# Patient Record
Sex: MALE | Race: WHITE | NOT HISPANIC OR LATINO | Employment: OTHER | URBAN - METROPOLITAN AREA
[De-identification: names, ages, dates, MRNs, and addresses within clinical notes are randomized per-mention and may not be internally consistent; named-entity substitution may affect disease eponyms.]

---

## 2018-02-26 ENCOUNTER — LAB (OUTPATIENT)
Dept: LAB | Facility: HOSPITAL | Age: 68
End: 2018-02-26

## 2018-02-26 ENCOUNTER — CONSULT (OUTPATIENT)
Dept: ONCOLOGY | Facility: CLINIC | Age: 68
End: 2018-02-26

## 2018-02-26 VITALS
HEIGHT: 72 IN | DIASTOLIC BLOOD PRESSURE: 64 MMHG | OXYGEN SATURATION: 99 % | BODY MASS INDEX: 34.1 KG/M2 | RESPIRATION RATE: 12 BRPM | SYSTOLIC BLOOD PRESSURE: 128 MMHG | WEIGHT: 251.8 LBS | HEART RATE: 53 BPM | TEMPERATURE: 97.8 F

## 2018-02-26 DIAGNOSIS — D64.9 NORMOCYTIC ANEMIA: ICD-10-CM

## 2018-02-26 DIAGNOSIS — N18.30 CHRONIC KIDNEY DISEASE, STAGE III (MODERATE) (HCC): Primary | ICD-10-CM

## 2018-02-26 DIAGNOSIS — N18.4 ANEMIA OF CHRONIC KIDNEY FAILURE, STAGE 4 (SEVERE) (HCC): Primary | ICD-10-CM

## 2018-02-26 DIAGNOSIS — D63.1 ANEMIA OF CHRONIC KIDNEY FAILURE, STAGE 4 (SEVERE) (HCC): Primary | ICD-10-CM

## 2018-02-26 LAB
ALBUMIN SERPL-MCNC: 3.7 G/DL (ref 3.5–5.2)
ALBUMIN/GLOB SERPL: 1.1 G/DL (ref 1.1–2.4)
ALP SERPL-CCNC: 147 U/L (ref 38–116)
ALT SERPL W P-5'-P-CCNC: 51 U/L (ref 0–41)
ANION GAP SERPL CALCULATED.3IONS-SCNC: 12.5 MMOL/L
AST SERPL-CCNC: 35 U/L (ref 0–40)
BASOPHILS # BLD AUTO: 0.03 10*3/MM3 (ref 0–0.1)
BASOPHILS NFR BLD AUTO: 0.5 % (ref 0–1.1)
BILIRUB SERPL-MCNC: 0.3 MG/DL (ref 0.1–1.2)
BUN BLD-MCNC: 86 MG/DL (ref 6–20)
BUN/CREAT SERPL: 32.6 (ref 7.3–30)
CALCIUM SPEC-SCNC: 9.5 MG/DL (ref 8.5–10.2)
CHLORIDE SERPL-SCNC: 102 MMOL/L (ref 98–107)
CO2 SERPL-SCNC: 23.5 MMOL/L (ref 22–29)
CREAT BLD-MCNC: 2.64 MG/DL (ref 0.7–1.3)
DEPRECATED RDW RBC AUTO: 45.4 FL (ref 37–49)
EOSINOPHIL # BLD AUTO: 0.71 10*3/MM3 (ref 0–0.36)
EOSINOPHIL NFR BLD AUTO: 11.9 % (ref 1–5)
ERYTHROCYTE [DISTWIDTH] IN BLOOD BY AUTOMATED COUNT: 13.5 % (ref 11.7–14.5)
FERRITIN SERPL-MCNC: 93.4 NG/ML (ref 30–400)
GFR SERPL CREATININE-BSD FRML MDRD: 24 ML/MIN/1.73
GLOBULIN UR ELPH-MCNC: 3.3 GM/DL (ref 1.8–3.5)
GLUCOSE BLD-MCNC: 156 MG/DL (ref 74–124)
HCT VFR BLD AUTO: 30.4 % (ref 40–49)
HGB BLD-MCNC: 10.4 G/DL (ref 13.5–16.5)
HGB RETIC QN: 34.1 PG (ref 29.8–36.1)
IMM GRANULOCYTES # BLD: 0.03 10*3/MM3 (ref 0–0.03)
IMM GRANULOCYTES NFR BLD: 0.5 % (ref 0–0.5)
IMM RETICS NFR: 6 % (ref 3–15.8)
IRON 24H UR-MRATE: 72 MCG/DL (ref 59–158)
IRON SATN MFR SERPL: 23 % (ref 14–48)
LDH SERPL-CCNC: 166 U/L (ref 99–259)
LYMPHOCYTES # BLD AUTO: 0.94 10*3/MM3 (ref 1–3.5)
LYMPHOCYTES NFR BLD AUTO: 15.8 % (ref 20–49)
MCH RBC QN AUTO: 31.3 PG (ref 27–33)
MCHC RBC AUTO-ENTMCNC: 34.2 G/DL (ref 32–35)
MCV RBC AUTO: 91.6 FL (ref 83–97)
MONOCYTES # BLD AUTO: 0.62 10*3/MM3 (ref 0.25–0.8)
MONOCYTES NFR BLD AUTO: 10.4 % (ref 4–12)
NEUTROPHILS # BLD AUTO: 3.62 10*3/MM3 (ref 1.5–7)
NEUTROPHILS NFR BLD AUTO: 60.9 % (ref 39–75)
NRBC BLD MANUAL-RTO: 0 /100 WBC (ref 0–0)
PLATELET # BLD AUTO: 226 10*3/MM3 (ref 150–375)
PMV BLD AUTO: 11.1 FL (ref 8.9–12.1)
POTASSIUM BLD-SCNC: 4.4 MMOL/L (ref 3.5–4.7)
PROT SERPL-MCNC: 7 G/DL (ref 6.3–8)
RBC # BLD AUTO: 3.32 10*6/MM3 (ref 4.3–5.5)
RETICS/RBC NFR AUTO: 1.53 % (ref 0.6–2)
SODIUM BLD-SCNC: 138 MMOL/L (ref 134–145)
TIBC SERPL-MCNC: 316 MCG/DL (ref 249–505)
TRANSFERRIN SERPL-MCNC: 226 MG/DL (ref 200–360)
VIT B12 BLD-MCNC: 789 PG/ML (ref 211–946)
WBC NRBC COR # BLD: 5.95 10*3/MM3 (ref 4–10)

## 2018-02-26 PROCEDURE — 85046 RETICYTE/HGB CONCENTRATE: CPT | Performed by: INTERNAL MEDICINE

## 2018-02-26 PROCEDURE — 83540 ASSAY OF IRON: CPT | Performed by: INTERNAL MEDICINE

## 2018-02-26 PROCEDURE — 82607 VITAMIN B-12: CPT | Performed by: INTERNAL MEDICINE

## 2018-02-26 PROCEDURE — 82728 ASSAY OF FERRITIN: CPT | Performed by: INTERNAL MEDICINE

## 2018-02-26 PROCEDURE — 80053 COMPREHEN METABOLIC PANEL: CPT | Performed by: INTERNAL MEDICINE

## 2018-02-26 PROCEDURE — 99205 OFFICE O/P NEW HI 60 MIN: CPT | Performed by: INTERNAL MEDICINE

## 2018-02-26 PROCEDURE — 83615 LACTATE (LD) (LDH) ENZYME: CPT | Performed by: INTERNAL MEDICINE

## 2018-02-26 PROCEDURE — 84466 ASSAY OF TRANSFERRIN: CPT | Performed by: INTERNAL MEDICINE

## 2018-02-26 RX ORDER — CLONIDINE HYDROCHLORIDE 0.2 MG/1
TABLET ORAL
COMMUNITY
Start: 2018-01-15 | End: 2018-06-07

## 2018-02-26 RX ORDER — TORSEMIDE 20 MG/1
10 TABLET ORAL DAILY
COMMUNITY
Start: 2017-10-18 | End: 2021-03-02

## 2018-02-26 RX ORDER — HYDRALAZINE HYDROCHLORIDE 50 MG/1
50 TABLET, FILM COATED ORAL 2 TIMES DAILY
Refills: 0 | Status: ON HOLD | COMMUNITY
Start: 2018-01-09 | End: 2021-01-31

## 2018-02-26 RX ORDER — POTASSIUM CHLORIDE 20 MEQ/1
10 TABLET, EXTENDED RELEASE ORAL DAILY
COMMUNITY
Start: 2018-01-15 | End: 2020-10-02 | Stop reason: ALTCHOICE

## 2018-02-26 RX ORDER — HYDRALAZINE HYDROCHLORIDE 50 MG/1
TABLET, FILM COATED ORAL
COMMUNITY
Start: 2018-01-09 | End: 2018-02-26 | Stop reason: SDUPTHER

## 2018-02-26 RX ORDER — ISOSORBIDE MONONITRATE 120 MG/1
60 TABLET, EXTENDED RELEASE ORAL EVERY MORNING
COMMUNITY
Start: 2018-01-02 | End: 2022-04-26

## 2018-02-26 RX ORDER — GLIMEPIRIDE 4 MG/1
2 TABLET ORAL
Status: ON HOLD | COMMUNITY
Start: 2017-09-11 | End: 2020-01-03

## 2018-02-26 NOTE — PROGRESS NOTES
REFERRING PROVIDER:    Gracie Conroy MD  [address]  [address]  [place] [id number]    REASON FOR CONSULTATION:    Monoclonal gammopathy    HISTORY OF PRESENT ILLNESS:  Donald Johnson is a 67 y.o. male who is referred today for further evaluation of monoclonal gammopathy.    He recently saw Dr. Conroy with nephrology.  Long history of type 2 diabetes and hypertension noted.  Labs showed a faint monoclonal lambda light chain on serum immunofixation.  Urine immunofixation was negative, only showing polyclonal light chains.  Serum protein electrophoresis was negative for a monoclonal protein.  His total protein level on the CMP was 6.0 with an albumin of 3.5.  His calcium was normal at 8.9.  His creatinine was elevated at 2.96.  He was anemic with hemoglobin of 9.7 with hematocrit 26%.  MCV was normal at 92.7.  White blood cells were normal at 7.3 with platelets 208,000.    Previously on 1/18/2016 his creatinine was 2.1 with a hemoglobin of 12.7.      He generally feels fatigued.  He denies any bleeding.  He states that he had a colonoscopy a couple of years ago.  He reports chronic nocturia.  He has some issues with constipation.  He denies any bony pain.  No respiratory symptoms.  No chest pain.  He does report a history of some easy bleeding after tonsillectomy and after a lesion was removed from his scalp.  However, he recently had a tooth extracted and has had no bleeding following this.  He is supposed to be taking aspirin but has not been taking this.    Past Medical History:   Diagnosis Date   • Arthritis    • Coarctation of aorta     very mild, likely not hemodynamically significant   • Diabetes mellitus    • History of cardiac cath     7/2013 with luminal irregularities, normal EF, normal LVEDP   • Hyperlipidemia    • Hypertension    • Obesity    • Splenic artery aneurysm        Past Surgical History:   Procedure Laterality Date   • CHOLECYSTECTOMY         SOCIAL HISTORY:   reports that  he has never smoked. He has never used smokeless tobacco. Drug use questions deferred to the physician. He reports that he does not drink alcohol.  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    FAMILY HISTORY:  family history includes Coronary artery disease in his father.  No family history of hematologic disorders    ALLERGIES:  Allergies   Allergen Reactions   • Amlodipine Swelling     Worse LE edema.       MEDICATIONS:  The medication list has been reviewed with the patient by the medical assistant, and the list has been updated in the electronic medical record, which I reviewed.  Medication dosages and frequencies were confirmed to be accurate.    Review of Systems   Constitutional: Positive for fatigue. Negative for appetite change, chills, fever and unexpected weight change.   HENT: Negative for congestion, dental problem, ear pain, hearing loss, mouth sores, nosebleeds, postnasal drip, rhinorrhea, tinnitus and trouble swallowing.    Eyes: Negative.    Respiratory: Negative for cough, chest tightness, shortness of breath, wheezing and stridor.    Cardiovascular: Negative for chest pain, palpitations and leg swelling.   Gastrointestinal: Positive for constipation. Negative for abdominal distention, abdominal pain, blood in stool, diarrhea, nausea and vomiting.   Endocrine: Negative.    Genitourinary: Positive for frequency. Negative for decreased urine volume, difficulty urinating, dysuria, flank pain, hematuria, scrotal swelling, testicular pain and urgency.   Musculoskeletal: Negative for arthralgias, back pain and joint swelling.   Allergic/Immunologic: Negative.    Neurological: Negative for dizziness, seizures, syncope, weakness, light-headedness, numbness and headaches.   Hematological: Negative for adenopathy. Does not bruise/bleed easily.   Psychiatric/Behavioral: Negative for confusion and sleep disturbance. The patient is not nervous/anxious.    All other systems reviewed and are  "negative.      Vitals:    02/26/18 0929   BP: 128/64   Pulse: 53   Resp: 12   Temp: 97.8 °F (36.6 °C)   TempSrc: Oral   SpO2: 99%   Weight: 114 kg (251 lb 12.8 oz)   Height: 183.5 cm (72.24\")  Comment: new height   PainSc: 0-No pain       Physical Exam   Constitutional: He is oriented to person, place, and time. He appears well-developed and well-nourished. No distress.   HENT:   Head: Normocephalic and atraumatic.   Mouth/Throat: Oropharynx is clear and moist.   Eyes: Conjunctivae and EOM are normal. Pupils are equal, round, and reactive to light.   Neck: Normal range of motion. Neck supple. No thyromegaly present.   Cardiovascular: Normal rate, regular rhythm and normal heart sounds.  Exam reveals no gallop and no friction rub.    No murmur heard.  Pulmonary/Chest: Effort normal and breath sounds normal. No respiratory distress. He has no wheezes. He has no rales.   Abdominal: Soft. Bowel sounds are normal. He exhibits no distension and no mass. There is no tenderness. There is no rebound and no guarding.   Obese   Musculoskeletal: Normal range of motion. He exhibits edema (Trace bilateral lower extremity). He exhibits no tenderness.   Lymphadenopathy:     He has no cervical adenopathy.   Neurological: He is alert and oriented to person, place, and time. He has normal reflexes.   Skin: Skin is warm and dry. No rash noted. He is not diaphoretic.   Psychiatric: He has a normal mood and affect. His behavior is normal.   Nursing note and vitals reviewed.      DIAGNOSTIC DATA:  WBC   Date Value Ref Range Status   02/26/2018 5.95 4.00 - 10.00 10*3/mm3 Final     RBC   Date Value Ref Range Status   02/26/2018 3.32 (L) 4.30 - 5.50 10*6/mm3 Final     Hemoglobin   Date Value Ref Range Status   02/26/2018 10.4 (L) 13.5 - 16.5 g/dL Final     Hematocrit   Date Value Ref Range Status   02/26/2018 30.4 (L) 40.0 - 49.0 % Final     MCV   Date Value Ref Range Status   02/26/2018 91.6 83.0 - 97.0 fL Final     MCH   Date Value Ref " Range Status   02/26/2018 31.3 27.0 - 33.0 pg Final     MCHC   Date Value Ref Range Status   02/26/2018 34.2 32.0 - 35.0 g/dL Final     RDW   Date Value Ref Range Status   02/26/2018 13.5 11.7 - 14.5 % Final     RDW-SD   Date Value Ref Range Status   02/26/2018 45.4 37.0 - 49.0 fl Final     MPV   Date Value Ref Range Status   02/26/2018 11.1 8.9 - 12.1 fL Final     Platelets   Date Value Ref Range Status   02/26/2018 226 150 - 375 10*3/mm3 Final     Neutrophil %   Date Value Ref Range Status   02/26/2018 60.9 39.0 - 75.0 % Final     Lymphocyte %   Date Value Ref Range Status   02/26/2018 15.8 (L) 20.0 - 49.0 % Final     Monocyte %   Date Value Ref Range Status   02/26/2018 10.4 4.0 - 12.0 % Final     Eosinophil %   Date Value Ref Range Status   02/26/2018 11.9 (H) 1.0 - 5.0 % Final     Basophil %   Date Value Ref Range Status   02/26/2018 0.5 0.0 - 1.1 % Final     Immature Grans %   Date Value Ref Range Status   02/26/2018 0.5 0.0 - 0.5 % Final     Neutrophils, Absolute   Date Value Ref Range Status   02/26/2018 3.62 1.50 - 7.00 10*3/mm3 Final     Lymphocytes, Absolute   Date Value Ref Range Status   02/26/2018 0.94 (L) 1.00 - 3.50 10*3/mm3 Final     Monocytes, Absolute   Date Value Ref Range Status   02/26/2018 0.62 0.25 - 0.80 10*3/mm3 Final     Eosinophils, Absolute   Date Value Ref Range Status   02/26/2018 0.71 (H) 0.00 - 0.36 10*3/mm3 Final     Basophils, Absolute   Date Value Ref Range Status   02/26/2018 0.03 0.00 - 0.10 10*3/mm3 Final     Immature Grans, Absolute   Date Value Ref Range Status   02/26/2018 0.03 0.00 - 0.03 10*3/mm3 Final     nRBC   Date Value Ref Range Status   02/26/2018 0.0 0.0 - 0.0 /100 WBC Final         IMAGING:    I personally reviewed his peripheral blood smear.  Anisocytosis and poikilocytosis present.  No schistocytes.  White blood cells appear normal in maturation and distribution.  Platelets are adequate in number and normal in morphology.    ASSESSMENT:  This is a 67 y.o. male  with:  1.  Chronic kidney disease: Probably stage III to stage IV.  Likely related to long-standing type 2 diabetes and hypertension.  Followed by Dr. Conroy with nephrology.  2.  Normocytic anemia: Almost certainly secondary to chronic kidney disease.  We will further evaluate today.  Pending labs, he could potentially be a candidate for Procrit therapy in the future although his hemoglobin today is too high to initiate this.  3.  Faint monoclonal lambda light chain on serum immunofixation: Likely clinically insignificant.  We will repeat labs today.    PLAN:   1.  Labs today including reticulocyte count, ferritin, iron studies, vitamin B12 level, RBC folate, erythropoietin level, LDH, CMP, serum protein electrophoresis, immunofixation, and serum free light chains.  2.  I will notify him with any abnormal results and otherwise plan to see him back in 3 months with a CBC and reticulocyte count at that time

## 2018-02-27 LAB
ALBUMIN SERPL-MCNC: 3.3 G/DL (ref 2.9–4.4)
ALBUMIN/GLOB SERPL: 1.2 {RATIO} (ref 0.7–1.7)
ALPHA1 GLOB FLD ELPH-MCNC: 0.2 G/DL (ref 0–0.4)
ALPHA2 GLOB SERPL ELPH-MCNC: 0.8 G/DL (ref 0.4–1)
B-GLOBULIN SERPL ELPH-MCNC: 1 G/DL (ref 0.7–1.3)
ETHNIC BACKGROUND STATED: 7.7 MIU/ML (ref 2.6–18.5)
FOLATE BLD-MCNC: 327.7 NG/ML
FOLATE RBC-MCNC: 1047 NG/ML
GAMMA GLOB SERPL ELPH-MCNC: 1.1 G/DL (ref 0.4–1.8)
GLOBULIN SER CALC-MCNC: 3 G/DL (ref 2.2–3.9)
HCT VFR BLD AUTO: 31.3 % (ref 37.5–51)
IGA SERPL-MCNC: 220 MG/DL (ref 61–437)
IGG SERPL-MCNC: 870 MG/DL (ref 700–1600)
IGM SERPL-MCNC: 157 MG/DL (ref 20–172)
INTERPRETATION SERPL IEP-IMP: ABNORMAL
KAPPA LC SERPL-MCNC: 99.3 MG/L (ref 3.3–19.4)
KAPPA LC/LAMBDA SER: 1.07 {RATIO} (ref 0.26–1.65)
LAMBDA LC FREE SERPL-MCNC: 93.2 MG/L (ref 5.7–26.3)
Lab: ABNORMAL
M-SPIKE: ABNORMAL G/DL
PROT SERPL-MCNC: 6.3 G/DL (ref 6–8.5)

## 2018-06-07 ENCOUNTER — OFFICE VISIT (OUTPATIENT)
Dept: ONCOLOGY | Facility: CLINIC | Age: 68
End: 2018-06-07

## 2018-06-07 ENCOUNTER — LAB (OUTPATIENT)
Dept: LAB | Facility: HOSPITAL | Age: 68
End: 2018-06-07

## 2018-06-07 VITALS
SYSTOLIC BLOOD PRESSURE: 134 MMHG | DIASTOLIC BLOOD PRESSURE: 68 MMHG | OXYGEN SATURATION: 98 % | WEIGHT: 244.4 LBS | HEART RATE: 54 BPM | RESPIRATION RATE: 18 BRPM | BODY MASS INDEX: 33.1 KG/M2 | HEIGHT: 72 IN | TEMPERATURE: 97.7 F

## 2018-06-07 DIAGNOSIS — D63.1 ANEMIA OF CHRONIC KIDNEY FAILURE, STAGE 4 (SEVERE) (HCC): Primary | ICD-10-CM

## 2018-06-07 DIAGNOSIS — D63.1 ANEMIA OF CHRONIC KIDNEY FAILURE, STAGE 4 (SEVERE) (HCC): ICD-10-CM

## 2018-06-07 DIAGNOSIS — D64.9 NORMOCYTIC ANEMIA: ICD-10-CM

## 2018-06-07 DIAGNOSIS — N18.4 ANEMIA OF CHRONIC KIDNEY FAILURE, STAGE 4 (SEVERE) (HCC): Primary | ICD-10-CM

## 2018-06-07 DIAGNOSIS — N18.4 ANEMIA OF CHRONIC KIDNEY FAILURE, STAGE 4 (SEVERE) (HCC): ICD-10-CM

## 2018-06-07 LAB
BASOPHILS # BLD AUTO: 0.03 10*3/MM3 (ref 0–0.1)
BASOPHILS NFR BLD AUTO: 0.5 % (ref 0–1.1)
DEPRECATED RDW RBC AUTO: 46.1 FL (ref 37–49)
EOSINOPHIL # BLD AUTO: 0.69 10*3/MM3 (ref 0–0.36)
EOSINOPHIL NFR BLD AUTO: 12.4 % (ref 1–5)
ERYTHROCYTE [DISTWIDTH] IN BLOOD BY AUTOMATED COUNT: 13.9 % (ref 11.7–14.5)
HCT VFR BLD AUTO: 30.9 % (ref 40–49)
HGB BLD-MCNC: 10.6 G/DL (ref 13.5–16.5)
HGB RETIC QN: 36.4 PG (ref 29.8–36.1)
IMM GRANULOCYTES # BLD: 0.02 10*3/MM3 (ref 0–0.03)
IMM GRANULOCYTES NFR BLD: 0.4 % (ref 0–0.5)
IMM RETICS NFR: 5.6 % (ref 3–15.8)
LYMPHOCYTES # BLD AUTO: 1.17 10*3/MM3 (ref 1–3.5)
LYMPHOCYTES NFR BLD AUTO: 21 % (ref 20–49)
MCH RBC QN AUTO: 31.4 PG (ref 27–33)
MCHC RBC AUTO-ENTMCNC: 34.3 G/DL (ref 32–35)
MCV RBC AUTO: 91.4 FL (ref 83–97)
MONOCYTES # BLD AUTO: 0.63 10*3/MM3 (ref 0.25–0.8)
MONOCYTES NFR BLD AUTO: 11.3 % (ref 4–12)
NEUTROPHILS # BLD AUTO: 3.04 10*3/MM3 (ref 1.5–7)
NEUTROPHILS NFR BLD AUTO: 54.4 % (ref 39–75)
NRBC BLD MANUAL-RTO: 0 /100 WBC (ref 0–0)
PLATELET # BLD AUTO: 219 10*3/MM3 (ref 150–375)
PMV BLD AUTO: 11.3 FL (ref 8.9–12.1)
RBC # BLD AUTO: 3.38 10*6/MM3 (ref 4.3–5.5)
RETICS/RBC NFR AUTO: 1.77 % (ref 0.6–2)
WBC NRBC COR # BLD: 5.58 10*3/MM3 (ref 4–10)

## 2018-06-07 PROCEDURE — 36415 COLL VENOUS BLD VENIPUNCTURE: CPT | Performed by: INTERNAL MEDICINE

## 2018-06-07 PROCEDURE — 85046 RETICYTE/HGB CONCENTRATE: CPT | Performed by: INTERNAL MEDICINE

## 2018-06-07 PROCEDURE — 99213 OFFICE O/P EST LOW 20 MIN: CPT | Performed by: INTERNAL MEDICINE

## 2018-06-07 PROCEDURE — 85025 COMPLETE CBC W/AUTO DIFF WBC: CPT | Performed by: INTERNAL MEDICINE

## 2018-06-07 NOTE — PROGRESS NOTES
[facility] GROUP OUTPATIENT FOLLOW UP CLINIC VISIT    REASON FOR FOLLOW-UP:    1.  Normocytic anemia secondary to chronic kidney disease stage IV  2.  Faint monoclonal lambda light chain discovered on serum immunofixation.  Labs repeated here did not demonstrate that.    HISTORY OF PRESENT ILLNESS:  Donald Johnson is a 67 y.o. male who returns today for follow up of the above issue.      He continues to feel well.  He is not on dialysis.  He denies any edema.  Energy level is reasonable.  He denies any bleeding.        HEMATOLOGIC HISTORY:  He recently saw Dr. Conroy with nephrology.  Long history of type 2 diabetes and hypertension noted.  Labs showed a faint monoclonal lambda light chain on serum immunofixation.  Urine immunofixation was negative, only showing polyclonal light chains.  Serum protein electrophoresis was negative for a monoclonal protein.  His total protein level on the CMP was 6.0 with an albumin of 3.5.  His calcium was normal at 8.9.  His creatinine was elevated at 2.96.  He was anemic with hemoglobin of 9.7 with hematocrit 26%.  MCV was normal at 92.7.  White blood cells were normal at 7.3 with platelets 208,000.     Previously on 1/18/2016 his creatinine was 2.1 with a hemoglobin of 12.7.      Initial labs here in the office showed a normal serum protein electrophoresis with immunofixation on 2/26/2018.  Serum free light chain K/L ratio was normal although both light chains were elevated secondary to his kidney disease.  His erythropoietin level was normal at 7.7.  Creatinine was 2.64.  Iron studies and vitamin B12 are adequate.    ALLERGIES:  Allergies   Allergen Reactions   • Amlodipine Swelling     Worse LE edema.       MEDICATIONS:  The medication list has been reviewed with the patient by the medical assistant, and the list has been updated in the electronic medical record, which I reviewed.  Medication dosages and frequencies were confirmed to be accurate.    REVIEW OF  "SYSTEMS:  PAIN:  See Vital Signs below.  GENERAL:  No fevers, chills, night sweats, or unintended weight loss.  Mild fatigue  SKIN:  No rashes or non-healing lesions.  HEME/LYMPH:  No abnormal bleeding.  No palpable lymphadenopathy.  EYES:  No vision changes or diplopia.  ENT:  No sore throat or difficulty swallowing.  RESPIRATORY:  No cough, shortness of breath, hemoptysis, or wheezing.  CARDIOVASCULAR:  No chest pain, palpitations, orthopnea, or dyspnea on exertion.  GASTROINTESTINAL:  No abdominal pain, nausea, vomiting, constipation, diarrhea, melena, or hematochezia.  GENITOURINARY:  No dysuria or hematuria.  Increased frequency  MUSCULOSKELETAL:  No joint pain, swelling, or erythema.  NEUROLOGIC:  No dizziness, loss of consciousness, or seizures.  PSYCHIATRIC:  No depression, anxiety, or mood changes.    Vitals:    06/07/18 0949   BP: 134/68   Pulse: 54   Resp: 18   Temp: 97.7 °F (36.5 °C)   TempSrc: Oral   SpO2: 98%   Weight: 111 kg (244 lb 6.4 oz)   Height: 183.5 cm (72.24\")   PainSc: 0-No pain       PHYSICAL EXAMINATION:  GENERAL:  Well-developed well-nourished male; awake, alert and oriented, in no acute distress.  SKIN:  Warm and dry, without rashes, purpura, or petechiae.  HEAD:  Normocephalic, atraumatic.  EYES:  Pupils equal, round and reactive to light.  Extraocular movements intact.  Conjunctivae normal.  EARS:  Hearing intact.  NOSE:  Septum midline.  No excoriations or nasal discharge.  MOUTH:  No stomatitis or ulcers.  Lips are normal.  THROAT:  Oropharynx without lesions or exudates.  NECK:  Supple with good range of motion; no thyromegaly or masses; no JVD or bruits.  LYMPHATICS:  No cervical, supraclavicular, or axillary lymphadenopathy.  CHEST:  Lungs are clear to auscultation bilaterally.  No wheezes, rales, or rhonchi.  HEART:  Regular rate; normal rhythm.  No murmurs, gallops or rubs.  ABDOMEN:  Soft, non-tender, non-distended.  Normal active bowel sounds.  No organomegaly.  EXTREMITIES:  " Trace BLE edema.  NEUROLOGICAL:  No focal neurologic deficits.    DIAGNOSTIC DATA:  Results for orders placed or performed in visit on 06/07/18   Retic With IRF & RET-He   Result Value Ref Range    Immature Reticulocyte Fraction 5.6 3.0 - 15.8 %    Reticulocyte % 1.77 0.60 - 2.00 %    Reticulocyte Hgb 36.4 (H) 29.8 - 36.1 pg   CBC Auto Differential   Result Value Ref Range    WBC 5.58 4.00 - 10.00 10*3/mm3    RBC 3.38 (L) 4.30 - 5.50 10*6/mm3    Hemoglobin 10.6 (L) 13.5 - 16.5 g/dL    Hematocrit 30.9 (L) 40.0 - 49.0 %    MCV 91.4 83.0 - 97.0 fL    MCH 31.4 27.0 - 33.0 pg    MCHC 34.3 32.0 - 35.0 g/dL    RDW 13.9 11.7 - 14.5 %    RDW-SD 46.1 37.0 - 49.0 fl    MPV 11.3 8.9 - 12.1 fL    Platelets 219 150 - 375 10*3/mm3    Neutrophil % 54.4 39.0 - 75.0 %    Lymphocyte % 21.0 20.0 - 49.0 %    Monocyte % 11.3 4.0 - 12.0 %    Eosinophil % 12.4 (H) 1.0 - 5.0 %    Basophil % 0.5 0.0 - 1.1 %    Immature Grans % 0.4 0.0 - 0.5 %    Neutrophils, Absolute 3.04 1.50 - 7.00 10*3/mm3    Lymphocytes, Absolute 1.17 1.00 - 3.50 10*3/mm3    Monocytes, Absolute 0.63 0.25 - 0.80 10*3/mm3    Eosinophils, Absolute 0.69 (H) 0.00 - 0.36 10*3/mm3    Basophils, Absolute 0.03 0.00 - 0.10 10*3/mm3    Immature Grans, Absolute 0.02 0.00 - 0.03 10*3/mm3    nRBC 0.0 0.0 - 0.0 /100 WBC       IMAGING:  None reviewed    ASSESSMENT:  This is a 67 y.o. male with:  1.  Chronic kidney disease stage 3-4.  He follows with Dr. Conroy with nephrology.  2.  Normocytic anemia related chronic kidney disease: His hemoglobin is stable at 10.6 today.  His erythropoietin level was low.  Iron studies and B12 are adequate.  No need for Procrit at this time.  3.  Faint monoclonal lambda light chain on serum immunofixation: Clinically insignificant and this was not visible on repeat labs here on 2/26/2018.    PLAN:  1.  I will see him back in 6 months for follow-up with a CBC, CMP, serum protein electrophoresis with immunofixation and serum free light chains at that time.   If his serum protein studies are unremarkable with no evidence for a monoclonal protein we will not follow these labs any further.

## 2018-12-06 ENCOUNTER — OFFICE VISIT (OUTPATIENT)
Dept: ONCOLOGY | Facility: CLINIC | Age: 68
End: 2018-12-06

## 2018-12-06 ENCOUNTER — LAB (OUTPATIENT)
Dept: LAB | Facility: HOSPITAL | Age: 68
End: 2018-12-06

## 2018-12-06 VITALS
OXYGEN SATURATION: 98 % | WEIGHT: 236.8 LBS | SYSTOLIC BLOOD PRESSURE: 160 MMHG | TEMPERATURE: 97.6 F | HEART RATE: 50 BPM | RESPIRATION RATE: 16 BRPM | DIASTOLIC BLOOD PRESSURE: 68 MMHG | HEIGHT: 72 IN | BODY MASS INDEX: 32.07 KG/M2

## 2018-12-06 DIAGNOSIS — N18.4 ANEMIA OF CHRONIC KIDNEY FAILURE, STAGE 4 (SEVERE) (HCC): Primary | ICD-10-CM

## 2018-12-06 DIAGNOSIS — D69.2 PURPURA (HCC): ICD-10-CM

## 2018-12-06 DIAGNOSIS — D63.1 ANEMIA OF CHRONIC KIDNEY FAILURE, STAGE 4 (SEVERE) (HCC): Primary | ICD-10-CM

## 2018-12-06 DIAGNOSIS — N18.4 ANEMIA OF CHRONIC KIDNEY FAILURE, STAGE 4 (SEVERE) (HCC): ICD-10-CM

## 2018-12-06 DIAGNOSIS — D63.1 ANEMIA OF CHRONIC KIDNEY FAILURE, STAGE 4 (SEVERE) (HCC): ICD-10-CM

## 2018-12-06 LAB
ALBUMIN SERPL-MCNC: 3.8 G/DL (ref 3.5–5.2)
ALBUMIN/GLOB SERPL: 1.5 G/DL (ref 1.1–2.4)
ALP SERPL-CCNC: 104 U/L (ref 38–116)
ALT SERPL W P-5'-P-CCNC: 23 U/L (ref 0–41)
ANION GAP SERPL CALCULATED.3IONS-SCNC: 13 MMOL/L
AST SERPL-CCNC: 20 U/L (ref 0–40)
BASOPHILS # BLD AUTO: 0.03 10*3/MM3 (ref 0–0.1)
BASOPHILS NFR BLD AUTO: 0.6 % (ref 0–1.1)
BILIRUB SERPL-MCNC: 0.4 MG/DL (ref 0.1–1.2)
BUN BLD-MCNC: 72 MG/DL (ref 6–20)
BUN/CREAT SERPL: 25.9 (ref 7.3–30)
CALCIUM SPEC-SCNC: 9 MG/DL (ref 8.5–10.2)
CHLORIDE SERPL-SCNC: 104 MMOL/L (ref 98–107)
CO2 SERPL-SCNC: 23 MMOL/L (ref 22–29)
CREAT BLD-MCNC: 2.78 MG/DL (ref 0.7–1.3)
DEPRECATED RDW RBC AUTO: 48.2 FL (ref 37–49)
EOSINOPHIL # BLD AUTO: 0.58 10*3/MM3 (ref 0–0.36)
EOSINOPHIL NFR BLD AUTO: 10.8 % (ref 1–5)
ERYTHROCYTE [DISTWIDTH] IN BLOOD BY AUTOMATED COUNT: 13.5 % (ref 11.7–14.5)
GFR SERPL CREATININE-BSD FRML MDRD: 23 ML/MIN/1.73
GLOBULIN UR ELPH-MCNC: 2.5 GM/DL (ref 1.8–3.5)
GLUCOSE BLD-MCNC: 65 MG/DL (ref 74–124)
HCT VFR BLD AUTO: 30.6 % (ref 40–49)
HGB BLD-MCNC: 10 G/DL (ref 13.5–16.5)
IMM GRANULOCYTES # BLD: 0.02 10*3/MM3 (ref 0–0.03)
IMM GRANULOCYTES NFR BLD: 0.4 % (ref 0–0.5)
LYMPHOCYTES # BLD AUTO: 1.03 10*3/MM3 (ref 1–3.5)
LYMPHOCYTES NFR BLD AUTO: 19.2 % (ref 20–49)
MCH RBC QN AUTO: 31.5 PG (ref 27–33)
MCHC RBC AUTO-ENTMCNC: 32.7 G/DL (ref 32–35)
MCV RBC AUTO: 96.5 FL (ref 83–97)
MONOCYTES # BLD AUTO: 0.64 10*3/MM3 (ref 0.25–0.8)
MONOCYTES NFR BLD AUTO: 11.9 % (ref 4–12)
NEUTROPHILS # BLD AUTO: 3.07 10*3/MM3 (ref 1.5–7)
NEUTROPHILS NFR BLD AUTO: 57.1 % (ref 39–75)
NRBC BLD MANUAL-RTO: 0 /100 WBC (ref 0–0)
PLATELET # BLD AUTO: 212 10*3/MM3 (ref 150–375)
PMV BLD AUTO: 10.3 FL (ref 8.9–12.1)
POTASSIUM BLD-SCNC: 4.4 MMOL/L (ref 3.5–4.7)
PROT SERPL-MCNC: 6.3 G/DL (ref 6.3–8)
RBC # BLD AUTO: 3.17 10*6/MM3 (ref 4.3–5.5)
SODIUM BLD-SCNC: 140 MMOL/L (ref 134–145)
WBC NRBC COR # BLD: 5.37 10*3/MM3 (ref 4–10)

## 2018-12-06 PROCEDURE — 99214 OFFICE O/P EST MOD 30 MIN: CPT | Performed by: INTERNAL MEDICINE

## 2018-12-06 PROCEDURE — 80053 COMPREHEN METABOLIC PANEL: CPT | Performed by: INTERNAL MEDICINE

## 2018-12-06 PROCEDURE — 36415 COLL VENOUS BLD VENIPUNCTURE: CPT | Performed by: INTERNAL MEDICINE

## 2018-12-06 PROCEDURE — 85025 COMPLETE CBC W/AUTO DIFF WBC: CPT | Performed by: INTERNAL MEDICINE

## 2018-12-06 NOTE — PROGRESS NOTES
[facility] GROUP OUTPATIENT FOLLOW UP CLINIC VISIT    REASON FOR FOLLOW-UP:    1.  Normocytic anemia secondary to chronic kidney disease stage IV  2.  Faint monoclonal lambda light chain discovered on serum immunofixation.  Labs repeated here did not demonstrate that.    HISTORY OF PRESENT ILLNESS:  Donald Johnson is a 68 y.o. male who returns today for follow up of the above issue.      He continues to feel well.  He is not on dialysis.  He denies any edema.  Energy level is reasonable.  He denies any bleeding.  He does note some easy bruising on his hands and arms.      HEMATOLOGIC HISTORY:  He recently saw Dr. Conroy with nephrology.  Long history of type 2 diabetes and hypertension noted.  Labs showed a faint monoclonal lambda light chain on serum immunofixation.  Urine immunofixation was negative, only showing polyclonal light chains.  Serum protein electrophoresis was negative for a monoclonal protein.  His total protein level on the CMP was 6.0 with an albumin of 3.5.  His calcium was normal at 8.9.  His creatinine was elevated at 2.96.  He was anemic with hemoglobin of 9.7 with hematocrit 26%.  MCV was normal at 92.7.  White blood cells were normal at 7.3 with platelets 208,000.     Previously on 1/18/2016 his creatinine was 2.1 with a hemoglobin of 12.7.      Initial labs here in the office showed a normal serum protein electrophoresis with immunofixation on 2/26/2018.  Serum free light chain K/L ratio was normal although both light chains were elevated secondary to his kidney disease.  His erythropoietin level was normal at 7.7.  Creatinine was 2.64.  Iron studies and vitamin B12 are adequate.    ALLERGIES:  Allergies   Allergen Reactions   • Amlodipine Swelling     Worse LE edema.       MEDICATIONS:  The medication list has been reviewed with the patient by the medical assistant, and the list has been updated in the electronic medical record, which I reviewed.  Medication dosages and  "frequencies were confirmed to be accurate.    REVIEW OF SYSTEMS:  PAIN:  See Vital Signs below.  GENERAL:  No fevers, chills, night sweats, or unintended weight loss.  Mild fatigue  SKIN:  No rashes or non-healing lesions.  HEME/LYMPH:  No abnormal bleeding.  No palpable lymphadenopathy.  Easy bruising.  EYES:  No vision changes or diplopia.  ENT:  No sore throat or difficulty swallowing.  RESPIRATORY:  No cough, shortness of breath, hemoptysis, or wheezing.  CARDIOVASCULAR:  No chest pain, palpitations, orthopnea, or dyspnea on exertion.  GASTROINTESTINAL:  No abdominal pain, nausea, vomiting, constipation, diarrhea, melena, or hematochezia.  GENITOURINARY:  No dysuria or hematuria.  Increased frequency  MUSCULOSKELETAL:  No joint pain, swelling, or erythema.  NEUROLOGIC:  No dizziness, loss of consciousness, or seizures.  PSYCHIATRIC:  No depression, anxiety, or mood changes.    Vitals:    12/06/18 1138   BP: 160/68   Pulse: 50   Resp: 16   Temp: 97.6 °F (36.4 °C)   TempSrc: Oral   SpO2: 98%   Weight: 107 kg (236 lb 12.8 oz)   Height: 183.5 cm (72.24\")   PainSc: 0-No pain       PHYSICAL EXAMINATION:  GENERAL:  Well-developed well-nourished male; awake, alert and oriented, in no acute distress.  SKIN:  Small purpuric lesions noted on his hands  HEAD:  Normocephalic, atraumatic.  EYES:  Pupils equal, round and reactive to light.  Extraocular movements intact.  Conjunctivae normal.  EARS:  Hearing intact.  NOSE:  Septum midline.  No excoriations or nasal discharge.  MOUTH:  No stomatitis or ulcers.  Lips are normal.  THROAT:  Oropharynx without lesions or exudates.  NECK:  Supple with good range of motion; no thyromegaly or masses; no JVD or bruits.  LYMPHATICS:  No cervical, supraclavicular, or axillary lymphadenopathy.  CHEST:  Lungs are clear to auscultation bilaterally.  No wheezes, rales, or rhonchi.  HEART:  Regular rate; normal rhythm.  No murmurs, gallops or rubs.  ABDOMEN:  Soft, non-tender, non-distended.  " Normal active bowel sounds.  No organomegaly.  EXTREMITIES:  No clubbing cyanosis or edema.  NEUROLOGICAL:  No focal neurologic deficits.    DIAGNOSTIC DATA:  Results for orders placed or performed in visit on 12/06/18   CBC Auto Differential   Result Value Ref Range    WBC 5.37 4.00 - 10.00 10*3/mm3    RBC 3.17 (L) 4.30 - 5.50 10*6/mm3    Hemoglobin 10.0 (L) 13.5 - 16.5 g/dL    Hematocrit 30.6 (L) 40.0 - 49.0 %    MCV 96.5 83.0 - 97.0 fL    MCH 31.5 27.0 - 33.0 pg    MCHC 32.7 32.0 - 35.0 g/dL    RDW 13.5 11.7 - 14.5 %    RDW-SD 48.2 37.0 - 49.0 fl    MPV 10.3 8.9 - 12.1 fL    Platelets 212 150 - 375 10*3/mm3    Neutrophil % 57.1 39.0 - 75.0 %    Lymphocyte % 19.2 (L) 20.0 - 49.0 %    Monocyte % 11.9 4.0 - 12.0 %    Eosinophil % 10.8 (H) 1.0 - 5.0 %    Basophil % 0.6 0.0 - 1.1 %    Immature Grans % 0.4 0.0 - 0.5 %    Neutrophils, Absolute 3.07 1.50 - 7.00 10*3/mm3    Lymphocytes, Absolute 1.03 1.00 - 3.50 10*3/mm3    Monocytes, Absolute 0.64 0.25 - 0.80 10*3/mm3    Eosinophils, Absolute 0.58 (H) 0.00 - 0.36 10*3/mm3    Basophils, Absolute 0.03 0.00 - 0.10 10*3/mm3    Immature Grans, Absolute 0.02 0.00 - 0.03 10*3/mm3    nRBC 0.0 0.0 - 0.0 /100 WBC       IMAGING:  None reviewed    ASSESSMENT:  This is a 68 y.o. male with:  1.  Chronic kidney disease stage 3-4.  He follows with Dr. Conroy with nephrology.  2.  Normocytic anemia related chronic kidney disease: His hemoglobin is stable at 10.0 today.  His erythropoietin level was low.  Iron studies and B12 are adequate.  No need for Procrit at this time.  3.  Faint monoclonal lambda light chain on serum immunofixation: Clinically insignificant and this was not visible on repeat labs here on 2/26/2018.  Labs were repeated today and are pending.  4.  Purpura on his arms: I advised him today that this is likely from uremic platelets.  Issue discussed today.    PLAN:  1.  I will see him back in 6 months for follow-up with a CBC and a reticulocyte count.  2.  Follow-up  pending labs from today and if the serum protein studies are negative and we do not need to follow these any further.

## 2018-12-07 LAB
ALBUMIN SERPL-MCNC: 3.3 G/DL (ref 2.9–4.4)
ALBUMIN/GLOB SERPL: 1.3 {RATIO} (ref 0.7–1.7)
ALPHA1 GLOB FLD ELPH-MCNC: 0.2 G/DL (ref 0–0.4)
ALPHA2 GLOB SERPL ELPH-MCNC: 0.6 G/DL (ref 0.4–1)
B-GLOBULIN SERPL ELPH-MCNC: 0.9 G/DL (ref 0.7–1.3)
GAMMA GLOB SERPL ELPH-MCNC: 0.9 G/DL (ref 0.4–1.8)
GLOBULIN SER CALC-MCNC: 2.6 G/DL (ref 2.2–3.9)
IGA SERPL-MCNC: 209 MG/DL (ref 61–437)
IGG SERPL-MCNC: 804 MG/DL (ref 700–1600)
IGM SERPL-MCNC: 134 MG/DL (ref 20–172)
INTERPRETATION SERPL IEP-IMP: ABNORMAL
KAPPA LC SERPL-MCNC: 76.1 MG/L (ref 3.3–19.4)
KAPPA LC/LAMBDA SER: 1.26 {RATIO} (ref 0.26–1.65)
LAMBDA LC FREE SERPL-MCNC: 60.4 MG/L (ref 5.7–26.3)
Lab: ABNORMAL
M-SPIKE: ABNORMAL G/DL
PROT SERPL-MCNC: 5.9 G/DL (ref 6–8.5)

## 2019-06-10 ENCOUNTER — APPOINTMENT (OUTPATIENT)
Dept: ONCOLOGY | Facility: CLINIC | Age: 69
End: 2019-06-10

## 2019-06-10 ENCOUNTER — OFFICE VISIT (OUTPATIENT)
Dept: ONCOLOGY | Facility: CLINIC | Age: 69
End: 2019-06-10

## 2019-06-10 ENCOUNTER — LAB (OUTPATIENT)
Dept: LAB | Facility: HOSPITAL | Age: 69
End: 2019-06-10

## 2019-06-10 VITALS
BODY MASS INDEX: 30.69 KG/M2 | WEIGHT: 231.6 LBS | HEART RATE: 50 BPM | OXYGEN SATURATION: 98 % | RESPIRATION RATE: 12 BRPM | DIASTOLIC BLOOD PRESSURE: 65 MMHG | HEIGHT: 73 IN | SYSTOLIC BLOOD PRESSURE: 138 MMHG | TEMPERATURE: 97.9 F

## 2019-06-10 DIAGNOSIS — D63.1 ANEMIA OF CHRONIC KIDNEY FAILURE, STAGE 4 (SEVERE) (HCC): Primary | ICD-10-CM

## 2019-06-10 DIAGNOSIS — N18.4 ANEMIA OF CHRONIC KIDNEY FAILURE, STAGE 4 (SEVERE) (HCC): Primary | ICD-10-CM

## 2019-06-10 DIAGNOSIS — D63.1 ANEMIA OF CHRONIC KIDNEY FAILURE, STAGE 4 (SEVERE) (HCC): ICD-10-CM

## 2019-06-10 DIAGNOSIS — N18.4 ANEMIA OF CHRONIC KIDNEY FAILURE, STAGE 4 (SEVERE) (HCC): ICD-10-CM

## 2019-06-10 LAB
BASOPHILS # BLD AUTO: 0.03 10*3/MM3 (ref 0–0.2)
BASOPHILS NFR BLD AUTO: 0.6 % (ref 0–1.5)
DEPRECATED RDW RBC AUTO: 44.8 FL (ref 37–54)
EOSINOPHIL # BLD AUTO: 0.63 10*3/MM3 (ref 0–0.4)
EOSINOPHIL NFR BLD AUTO: 12.3 % (ref 0.3–6.2)
ERYTHROCYTE [DISTWIDTH] IN BLOOD BY AUTOMATED COUNT: 13.2 % (ref 12.3–15.4)
HCT VFR BLD AUTO: 29.9 % (ref 37.5–51)
HGB BLD-MCNC: 10.2 G/DL (ref 13–17.7)
HGB RETIC QN AUTO: 35.8 PG (ref 29.8–36.1)
IMM GRANULOCYTES # BLD AUTO: 0.01 10*3/MM3 (ref 0–0.05)
IMM GRANULOCYTES NFR BLD AUTO: 0.2 % (ref 0–0.5)
IMM RETICS NFR: 3.4 % (ref 3–15.8)
LYMPHOCYTES # BLD AUTO: 1.05 10*3/MM3 (ref 0.7–3.1)
LYMPHOCYTES NFR BLD AUTO: 20.5 % (ref 19.6–45.3)
MCH RBC QN AUTO: 31.6 PG (ref 26.6–33)
MCHC RBC AUTO-ENTMCNC: 34.1 G/DL (ref 31.5–35.7)
MCV RBC AUTO: 92.6 FL (ref 79–97)
MONOCYTES # BLD AUTO: 0.81 10*3/MM3 (ref 0.1–0.9)
MONOCYTES NFR BLD AUTO: 15.8 % (ref 5–12)
NEUTROPHILS # BLD AUTO: 2.59 10*3/MM3 (ref 1.7–7)
NEUTROPHILS NFR BLD AUTO: 50.6 % (ref 42.7–76)
NRBC BLD AUTO-RTO: 0 /100 WBC (ref 0–0.2)
PLATELET # BLD AUTO: 202 10*3/MM3 (ref 140–450)
PMV BLD AUTO: 10.8 FL (ref 6–12)
RBC # BLD AUTO: 3.23 10*6/MM3 (ref 4.14–5.8)
RETICS/RBC NFR AUTO: 1.5 % (ref 0.7–1.9)
WBC NRBC COR # BLD: 5.12 10*3/MM3 (ref 3.4–10.8)

## 2019-06-10 PROCEDURE — 36415 COLL VENOUS BLD VENIPUNCTURE: CPT

## 2019-06-10 PROCEDURE — 85046 RETICYTE/HGB CONCENTRATE: CPT

## 2019-06-10 PROCEDURE — 85025 COMPLETE CBC W/AUTO DIFF WBC: CPT

## 2019-06-10 PROCEDURE — 99213 OFFICE O/P EST LOW 20 MIN: CPT | Performed by: NURSE PRACTITIONER

## 2019-06-10 NOTE — PROGRESS NOTES
[facility] CBC GROUP OUTPATIENT FOLLOW UP CLINIC VISIT    REASON FOR FOLLOW-UP:    1.  Normocytic anemia secondary to chronic kidney disease stage IV  2.  Faint monoclonal lambda light chain discovered on serum immunofixation.  Labs repeated here did not demonstrate abnormality.    HISTORY OF PRESENT ILLNESS:  Donald Johnson is a 68 y.o. male with the above-mentioned history, who returns the office today for six-month follow-up and lab review.  He is followed in our office for anemia related to chronic kidney disease.  Today, the patient reports he is feeling well.  He reports he has chronic back pain which is being managed by his primary care physician as well as orthopedic surgery.  He is scheduled to follow-up with nephrology next week.  He reports fatigue though states this is unchanged.  He denies shortness of breath or chest pain.    HEMATOLOGIC HISTORY:  He recently saw Dr. Conroy with nephrology.  Long history of type 2 diabetes and hypertension noted.  Labs showed a faint monoclonal lambda light chain on serum immunofixation.  Urine immunofixation was negative, only showing polyclonal light chains.  Serum protein electrophoresis was negative for a monoclonal protein.  His total protein level on the CMP was 6.0 with an albumin of 3.5.  His calcium was normal at 8.9.  His creatinine was elevated at 2.96.  He was anemic with hemoglobin of 9.7 with hematocrit 26%.  MCV was normal at 92.7.  White blood cells were normal at 7.3 with platelets 208,000.     Previously on 1/18/2016 his creatinine was 2.1 with a hemoglobin of 12.7.      Initial labs here in the office showed a normal serum protein electrophoresis with immunofixation on 2/26/2018.  Serum free light chain K/L ratio was normal although both light chains were elevated secondary to his kidney disease.  His erythropoietin level was normal at 7.7.  Creatinine was 2.64.  Iron studies and vitamin B12 are adequate.    ALLERGIES:  Allergies   Allergen  "Reactions   • Amlodipine Swelling     Worse LE edema.       MEDICATIONS:  The medication list has been reviewed with the patient by the medical assistant, and the list has been updated in the electronic medical record, which I reviewed.  Medication dosages and frequencies were confirmed to be accurate.    I have reviewed the patient's medical history in detail and updated the computerized patient record.    Review of Systems   Constitutional: Positive for fatigue. Negative for appetite change, chills and fever.   HENT:   Negative for trouble swallowing.    Respiratory: Negative for cough and shortness of breath.    Cardiovascular: Negative for chest pain and leg swelling.   Gastrointestinal: Negative for abdominal distention, blood in stool, constipation, diarrhea and nausea.   Musculoskeletal: Positive for back pain (chronic). Negative for arthralgias and myalgias.   Skin: Negative for rash.   Neurological: Negative for dizziness and extremity weakness.   Hematological: Does not bruise/bleed easily.   Review of systems 6/10/2019 unchanged from previous office visit except as updated      Vitals:    06/10/19 1154   BP: 138/65   Pulse: 50   Resp: 12   Temp: 97.9 °F (36.6 °C)   TempSrc: Oral   SpO2: 98%   Weight: 105 kg (231 lb 9.6 oz)   Height: 185.4 cm (72.99\")   PainSc:   3   PainLoc: Back       PHYSICAL EXAMINATION:  GENERAL:  Well-developed well-nourished male; awake, alert and oriented, in no acute distress.  SKIN:  Small purpuric lesions noted on his hands  HEAD:  Normocephalic, atraumatic.  EYES:  Pupils equal, round and reactive to light.  Extraocular movements intact.  Conjunctivae normal.  EARS:  Hearing intact.  NOSE:  Septum midline.  No excoriations or nasal discharge.  MOUTH:  No stomatitis or ulcers.  Lips are normal.  THROAT:  Oropharynx without lesions or exudates.  CHEST:  Lungs are clear to auscultation bilaterally.  No wheezes, rales, or rhonchi.  HEART:  Regular rate; normal rhythm.  No murmurs, " gallops or rubs.  ABDOMEN:  Soft, non-tender, non-distended.  Normal active bowel sounds.   EXTREMITIES:  No clubbing cyanosis or edema.  NEUROLOGICAL:  No focal neurologic deficits.  Physical exam 6/10/2019 unchanged from previous office visit except as updated.     DIAGNOSTIC DATA:  Results from last 7 days   Lab Units 06/10/19  1147   WBC 10*3/mm3 5.12   NEUTROS ABS 10*3/mm3 2.59   HEMOGLOBIN g/dL 10.2*   HEMATOCRIT % 29.9*   PLATELETS 10*3/mm3 202               IMAGING:  None reviewed    ASSESSMENT:  This is a 68 y.o. male with:  1.  Chronic kidney disease stage 3-4.  He follows with Dr. Conroy with nephrology.  2.  Normocytic anemia related chronic kidney disease: His hemoglobin is stable at 10.2 today.  His erythropoietin level was low.  Iron studies and B12 are adequate.  No need for Procrit at this time.  3.  Faint monoclonal lambda light chain on serum immunofixation: Clinically insignificant and this was not visible on repeat labs here on 2/26/2018.  Repeat labs 12/6/2018 without significant abnormality.     PLAN:  1. Currently, no need to initiate Procrit with hemoglobin greater than 10.0.  2. Return in 6 months for CBC, retic count and follow-up with Dr. Hernandez.    Sariah Smith, APRN  06/10/2019

## 2019-11-25 ENCOUNTER — OFFICE VISIT (OUTPATIENT)
Dept: ONCOLOGY | Facility: CLINIC | Age: 69
End: 2019-11-25

## 2019-11-25 ENCOUNTER — LAB (OUTPATIENT)
Dept: LAB | Facility: HOSPITAL | Age: 69
End: 2019-11-25

## 2019-11-25 VITALS
TEMPERATURE: 97.6 F | OXYGEN SATURATION: 97 % | RESPIRATION RATE: 16 BRPM | SYSTOLIC BLOOD PRESSURE: 144 MMHG | BODY MASS INDEX: 30.75 KG/M2 | DIASTOLIC BLOOD PRESSURE: 65 MMHG | HEIGHT: 73 IN | WEIGHT: 232 LBS | HEART RATE: 54 BPM

## 2019-11-25 DIAGNOSIS — D63.1 ANEMIA OF CHRONIC KIDNEY FAILURE, STAGE 4 (SEVERE) (HCC): Primary | ICD-10-CM

## 2019-11-25 DIAGNOSIS — N18.4 ANEMIA OF CHRONIC KIDNEY FAILURE, STAGE 4 (SEVERE) (HCC): Primary | ICD-10-CM

## 2019-11-25 LAB
BASOPHILS # BLD AUTO: 0.03 10*3/MM3 (ref 0–0.2)
BASOPHILS NFR BLD AUTO: 0.6 % (ref 0–1.5)
DEPRECATED RDW RBC AUTO: 48 FL (ref 37–54)
EOSINOPHIL # BLD AUTO: 0.43 10*3/MM3 (ref 0–0.4)
EOSINOPHIL NFR BLD AUTO: 8.9 % (ref 0.3–6.2)
ERYTHROCYTE [DISTWIDTH] IN BLOOD BY AUTOMATED COUNT: 13.8 % (ref 12.3–15.4)
HCT VFR BLD AUTO: 28.3 % (ref 37.5–51)
HGB BLD-MCNC: 9.5 G/DL (ref 13–17.7)
HGB RETIC QN AUTO: 35.3 PG (ref 29.8–36.1)
IMM GRANULOCYTES # BLD AUTO: 0.03 10*3/MM3 (ref 0–0.05)
IMM GRANULOCYTES NFR BLD AUTO: 0.6 % (ref 0–0.5)
IMM RETICS NFR: 4.4 % (ref 3–15.8)
LYMPHOCYTES # BLD AUTO: 0.72 10*3/MM3 (ref 0.7–3.1)
LYMPHOCYTES NFR BLD AUTO: 14.9 % (ref 19.6–45.3)
MCH RBC QN AUTO: 32 PG (ref 26.6–33)
MCHC RBC AUTO-ENTMCNC: 33.6 G/DL (ref 31.5–35.7)
MCV RBC AUTO: 95.3 FL (ref 79–97)
MONOCYTES # BLD AUTO: 0.45 10*3/MM3 (ref 0.1–0.9)
MONOCYTES NFR BLD AUTO: 9.3 % (ref 5–12)
NEUTROPHILS # BLD AUTO: 3.17 10*3/MM3 (ref 1.7–7)
NEUTROPHILS NFR BLD AUTO: 65.7 % (ref 42.7–76)
NRBC BLD AUTO-RTO: 0 /100 WBC (ref 0–0.2)
PLATELET # BLD AUTO: 175 10*3/MM3 (ref 140–450)
PMV BLD AUTO: 11 FL (ref 6–12)
RBC # BLD AUTO: 2.97 10*6/MM3 (ref 4.14–5.8)
RETICS/RBC NFR AUTO: 1.61 % (ref 0.7–1.9)
WBC NRBC COR # BLD: 4.83 10*3/MM3 (ref 3.4–10.8)

## 2019-11-25 PROCEDURE — 36416 COLLJ CAPILLARY BLOOD SPEC: CPT

## 2019-11-25 PROCEDURE — 85046 RETICYTE/HGB CONCENTRATE: CPT

## 2019-11-25 PROCEDURE — 99213 OFFICE O/P EST LOW 20 MIN: CPT | Performed by: INTERNAL MEDICINE

## 2019-11-25 PROCEDURE — 85025 COMPLETE CBC W/AUTO DIFF WBC: CPT

## 2019-11-25 RX ORDER — FOLIC ACID/VIT B COMPLEX AND C 0.8 MG
1 TABLET ORAL DAILY
COMMUNITY
Start: 2019-09-26 | End: 2021-03-02

## 2019-11-25 RX ORDER — ALBUTEROL SULFATE 90 UG/1
2 AEROSOL, METERED RESPIRATORY (INHALATION)
COMMUNITY
Start: 2019-09-03 | End: 2019-12-20

## 2019-11-25 RX ORDER — BLOOD-GLUCOSE METER
KIT MISCELLANEOUS
Refills: 3 | COMMUNITY
Start: 2019-08-26

## 2019-11-25 RX ORDER — TAMSULOSIN HYDROCHLORIDE 0.4 MG/1
CAPSULE ORAL
Refills: 0 | COMMUNITY
Start: 2019-09-26 | End: 2019-12-20

## 2019-11-25 RX ORDER — FOLIC ACID/VIT B COMPLEX AND C 0.8 MG
TABLET ORAL
Refills: 4 | COMMUNITY
Start: 2019-09-26 | End: 2019-12-20

## 2019-11-25 RX ORDER — LANCETS 28 GAUGE
EACH MISCELLANEOUS
COMMUNITY
Start: 2019-11-24

## 2019-11-25 RX ORDER — FLUTICASONE PROPIONATE 50 MCG
SPRAY, SUSPENSION (ML) NASAL
Status: ON HOLD | COMMUNITY
Start: 2019-11-06 | End: 2020-01-03

## 2019-11-25 NOTE — PROGRESS NOTES
[facility] CBC GROUP OUTPATIENT FOLLOW UP CLINIC VISIT    REASON FOR FOLLOW-UP:    1.  Normocytic anemia secondary to chronic kidney disease stage IV  2.  Faint monoclonal lambda light chain discovered on serum immunofixation.  Labs repeated here did not demonstrate abnormality.    HISTORY OF PRESENT ILLNESS:  Donald Johnson is a 69 y.o. male with the above-mentioned history, who returns the office today for six-month follow-up and lab review.  Last seen on 6/10/2019 with a hemoglobin of 10.2.    He reports ongoing fatigue.  This is not really any worse.  He reports hearing loss from the right ear.  He is going to see otolaryngology for this in the near future.  He also had some CT imaging done recently that showed an enlarging kidney mass and he is going to see a urologist in the near future.    HEMATOLOGIC HISTORY:  He recently saw Dr. Conroy with nephrology.  Long history of type 2 diabetes and hypertension noted.  Labs showed a faint monoclonal lambda light chain on serum immunofixation.  Urine immunofixation was negative, only showing polyclonal light chains.  Serum protein electrophoresis was negative for a monoclonal protein.  His total protein level on the CMP was 6.0 with an albumin of 3.5.  His calcium was normal at 8.9.  His creatinine was elevated at 2.96.  He was anemic with hemoglobin of 9.7 with hematocrit 26%.  MCV was normal at 92.7.  White blood cells were normal at 7.3 with platelets 208,000.     Previously on 1/18/2016 his creatinine was 2.1 with a hemoglobin of 12.7.      Initial labs here in the office showed a normal serum protein electrophoresis with immunofixation on 2/26/2018.  Serum free light chain K/L ratio was normal although both light chains were elevated secondary to his kidney disease.  His erythropoietin level was normal at 7.7.  Creatinine was 2.64.  Iron studies and vitamin B12 are adequate.    ALLERGIES:  Allergies   Allergen Reactions   • Amlodipine Swelling     Worse LE  "edema.       MEDICATIONS:  The medication list has been reviewed with the patient by the medical assistant, and the list has been updated in the electronic medical record, which I reviewed.  Medication dosages and frequencies were confirmed to be accurate.    I have reviewed the patient's medical history in detail and updated the computerized patient record.    Review of Systems   Constitutional: Positive for fatigue. Negative for appetite change, chills and fever.   HENT:   Negative for trouble swallowing.    Respiratory: Negative for cough and shortness of breath.    Cardiovascular: Negative for chest pain and leg swelling.   Gastrointestinal: Negative for abdominal distention, blood in stool, constipation, diarrhea and nausea.   Musculoskeletal: Positive for back pain (chronic). Negative for arthralgias and myalgias.   Skin: Negative for rash.   Neurological: Negative for dizziness and extremity weakness.   Hematological: Does not bruise/bleed easily.   Review of systems today unchanged from previous office visit except as updated      Vitals:    11/25/19 0945   BP: 144/65   Pulse: 54   Resp: 16   Temp: 97.6 °F (36.4 °C)   TempSrc: Oral   SpO2: 97%   Weight: 105 kg (232 lb)   Height: 185.4 cm (72.99\")   PainSc: 0-No pain  Comment: kidney cancer       PHYSICAL EXAMINATION:  GENERAL:  Well-developed well-nourished male; awake, alert and oriented, in no acute distress.  SKIN: No rash  HEAD:  Normocephalic, atraumatic.  EARS:  Hearing intact.  NOSE:  Septum midline.  No excoriations or nasal discharge.  MOUTH:  No stomatitis or ulcers.  Lips are normal.  THROAT:  Oropharynx without lesions or exudates.  CHEST:  Lungs are clear to auscultation bilaterally.  No wheezes, rales, or rhonchi.  HEART:  Regular rate; normal rhythm.  No murmurs, gallops or rubs.  EXTREMITIES:  No clubbing cyanosis or edema    DIAGNOSTIC DATA:    Results for orders placed or performed in visit on 11/25/19   Retic With IRF & RET-He   Result " Value Ref Range    Immature Reticulocyte Fraction 4.4 3.0 - 15.8 %    Reticulocyte % 1.61 0.70 - 1.90 %    Reticulocyte Hgb 35.3 29.8 - 36.1 pg   CBC Auto Differential   Result Value Ref Range    WBC 4.83 3.40 - 10.80 10*3/mm3    RBC 2.97 (L) 4.14 - 5.80 10*6/mm3    Hemoglobin 9.5 (L) 13.0 - 17.7 g/dL    Hematocrit 28.3 (L) 37.5 - 51.0 %    MCV 95.3 79.0 - 97.0 fL    MCH 32.0 26.6 - 33.0 pg    MCHC 33.6 31.5 - 35.7 g/dL    RDW 13.8 12.3 - 15.4 %    RDW-SD 48.0 37.0 - 54.0 fl    MPV 11.0 6.0 - 12.0 fL    Platelets 175 140 - 450 10*3/mm3    Neutrophil % 65.7 42.7 - 76.0 %    Lymphocyte % 14.9 (L) 19.6 - 45.3 %    Monocyte % 9.3 5.0 - 12.0 %    Eosinophil % 8.9 (H) 0.3 - 6.2 %    Basophil % 0.6 0.0 - 1.5 %    Immature Grans % 0.6 (H) 0.0 - 0.5 %    Neutrophils, Absolute 3.17 1.70 - 7.00 10*3/mm3    Lymphocytes, Absolute 0.72 0.70 - 3.10 10*3/mm3    Monocytes, Absolute 0.45 0.10 - 0.90 10*3/mm3    Eosinophils, Absolute 0.43 (H) 0.00 - 0.40 10*3/mm3    Basophils, Absolute 0.03 0.00 - 0.20 10*3/mm3    Immature Grans, Absolute 0.03 0.00 - 0.05 10*3/mm3    nRBC 0.0 0.0 - 0.2 /100 WBC     IMAGING: CT abdomen and pelvis 10/22/2019 at [place]    IMPRESSION:  1. The largest indeterminate right lower pole renal mass has increased in size significantly. The differential diagnosis is enlarging solid neoplasm such as renal cell carcinoma versus enlarging hyperdense cyst. Renal ultrasound should be able to   differentiate.  2. The other bilateral indeterminate renal lesions are stable. Some are highly likely to be hyperdense cysts and at least one on the left is probably a simple cyst. The others remain indeterminate.  3. Cardiomegaly with coronary artery atherosclerosis.    ASSESSMENT:  This is a 69 y.o. male with:  1.  Chronic kidney disease stage 3-4.  He follows with Dr. Conroy with nephrology.  2.  Normocytic anemia related chronic kidney disease: His hemoglobin is a little lower at 9.5 today.  His erythropoietin level was low.   Iron studies and B12 are adequate.  Hemoglobin was 9.7 back in May and improved to 10.2 in June.  I advised him today that we could pursue therapy with Procrit or similar if all of his other anemia labs are appropriate.  He recently started a vitamin prescribed by Dr. Conroy.  Our goal hemoglobin would be greater than 10.  Therefore, we do not have much room for improvement at this point.  We will follow him closely in 3 months and consider further laboratory anemia evaluation at that time should his hemoglobin remains less than 10.  3.  Faint monoclonal lambda light chain on serum immunofixation: Clinically insignificant and this was not visible on repeat labs here on 2/26/2018.  Repeat labs 12/6/2018 without significant abnormality.   4.  Renal lesions with a right lower pole renal mass increasing in size from 1.2 cm to 1.8 cm.  There are also some renal cysts present as well.  He is going to see urology in the near future.      PLAN:  1. I will see him back in 3 months for follow-up with a CBC and reticulocyte count and further anemia labs then with consideration of an [place] if appropriate.    Pollo Hernandez MD

## 2019-12-18 ENCOUNTER — TELEPHONE (OUTPATIENT)
Dept: GENERAL RADIOLOGY | Facility: HOSPITAL | Age: 69
End: 2019-12-18

## 2019-12-18 DIAGNOSIS — N18.4 ANEMIA OF CHRONIC KIDNEY FAILURE, STAGE 4 (SEVERE) (HCC): Primary | ICD-10-CM

## 2019-12-18 DIAGNOSIS — D63.1 ANEMIA OF CHRONIC KIDNEY FAILURE, STAGE 4 (SEVERE) (HCC): Primary | ICD-10-CM

## 2019-12-18 NOTE — PROGRESS NOTES
Communication with Dr. Conroy with nephrology.  He has surgery scheduled on January 3.  Ideally we would get his hemoglobin up prior to surgery.  He will likely qualify for Procrit or similar.  We will get him in to the office in the near future with stat labs and we will see if he qualifies for Procrit injection that day.    If he meets criteria with a hemoglobin of less than 10 and adequate iron stores, proceed with Procrit on that day.  If he needs iron, IV injectafer that day if possible.  If we start Procrit, we will see if we can give him a second dose then before January 3.

## 2019-12-18 NOTE — TELEPHONE ENCOUNTER
----- Message from Pollo Hernandez MD sent at 12/18/2019 11:55 AM EST -----  Please have him see a NP ASAP with a CBC, retic count, STAT ferritin and iron studies, a CMP, and an erythropoietin level.  Appt for Procrit in the office that day assuming he qualifies.  Thanks, ABY

## 2019-12-19 ENCOUNTER — LAB (OUTPATIENT)
Dept: OTHER | Facility: HOSPITAL | Age: 69
End: 2019-12-19

## 2019-12-19 ENCOUNTER — OFFICE VISIT (OUTPATIENT)
Dept: ONCOLOGY | Facility: CLINIC | Age: 69
End: 2019-12-19

## 2019-12-19 ENCOUNTER — INFUSION (OUTPATIENT)
Dept: ONCOLOGY | Facility: HOSPITAL | Age: 69
End: 2019-12-19

## 2019-12-19 VITALS
OXYGEN SATURATION: 98 % | HEIGHT: 73 IN | DIASTOLIC BLOOD PRESSURE: 56 MMHG | SYSTOLIC BLOOD PRESSURE: 120 MMHG | BODY MASS INDEX: 30.63 KG/M2 | WEIGHT: 231.1 LBS | HEART RATE: 56 BPM | TEMPERATURE: 98.1 F | RESPIRATION RATE: 16 BRPM

## 2019-12-19 DIAGNOSIS — D63.1 ANEMIA OF CHRONIC KIDNEY FAILURE, STAGE 4 (SEVERE) (HCC): ICD-10-CM

## 2019-12-19 DIAGNOSIS — N18.4 ANEMIA OF CHRONIC KIDNEY FAILURE, STAGE 4 (SEVERE) (HCC): ICD-10-CM

## 2019-12-19 DIAGNOSIS — N18.4 ANEMIA OF CHRONIC KIDNEY FAILURE, STAGE 4 (SEVERE) (HCC): Primary | ICD-10-CM

## 2019-12-19 DIAGNOSIS — D63.1 ANEMIA OF CHRONIC KIDNEY FAILURE, STAGE 4 (SEVERE) (HCC): Primary | ICD-10-CM

## 2019-12-19 DIAGNOSIS — N18.30 CHRONIC KIDNEY DISEASE, STAGE III (MODERATE) (HCC): Primary | ICD-10-CM

## 2019-12-19 LAB
BASOPHILS # BLD AUTO: 0.04 10*3/MM3 (ref 0–0.2)
BASOPHILS NFR BLD AUTO: 0.8 % (ref 0–1.5)
DEPRECATED RDW RBC AUTO: 45.3 FL (ref 37–54)
EOSINOPHIL # BLD AUTO: 0.57 10*3/MM3 (ref 0–0.4)
EOSINOPHIL NFR BLD AUTO: 11.6 % (ref 0.3–6.2)
ERYTHROCYTE [DISTWIDTH] IN BLOOD BY AUTOMATED COUNT: 13.4 % (ref 12.3–15.4)
HCT VFR BLD AUTO: 27.7 % (ref 37.5–51)
HGB BLD-MCNC: 9.4 G/DL (ref 13–17.7)
IMM GRANULOCYTES # BLD AUTO: 0.01 10*3/MM3 (ref 0–0.05)
IMM GRANULOCYTES NFR BLD AUTO: 0.2 % (ref 0–0.5)
LYMPHOCYTES # BLD AUTO: 0.88 10*3/MM3 (ref 0.7–3.1)
LYMPHOCYTES NFR BLD AUTO: 18 % (ref 19.6–45.3)
MCH RBC QN AUTO: 31.2 PG (ref 26.6–33)
MCHC RBC AUTO-ENTMCNC: 33.9 G/DL (ref 31.5–35.7)
MCV RBC AUTO: 92 FL (ref 79–97)
MONOCYTES # BLD AUTO: 0.51 10*3/MM3 (ref 0.1–0.9)
MONOCYTES NFR BLD AUTO: 10.4 % (ref 5–12)
NEUTROPHILS # BLD AUTO: 2.89 10*3/MM3 (ref 1.7–7)
NEUTROPHILS NFR BLD AUTO: 59 % (ref 42.7–76)
NRBC BLD AUTO-RTO: 0 /100 WBC (ref 0–0.2)
PLATELET # BLD AUTO: 209 10*3/MM3 (ref 140–450)
PMV BLD AUTO: 11.2 FL (ref 6–12)
RBC # BLD AUTO: 3.01 10*6/MM3 (ref 4.14–5.8)
WBC NRBC COR # BLD: 4.9 10*3/MM3 (ref 3.4–10.8)

## 2019-12-19 PROCEDURE — 36415 COLL VENOUS BLD VENIPUNCTURE: CPT

## 2019-12-19 PROCEDURE — 96372 THER/PROPH/DIAG INJ SC/IM: CPT

## 2019-12-19 PROCEDURE — 99213 OFFICE O/P EST LOW 20 MIN: CPT | Performed by: NURSE PRACTITIONER

## 2019-12-19 PROCEDURE — 25010000002 EPOETIN ALFA PER 1000 UNITS: Performed by: NURSE PRACTITIONER

## 2019-12-19 PROCEDURE — 85025 COMPLETE CBC W/AUTO DIFF WBC: CPT | Performed by: INTERNAL MEDICINE

## 2019-12-19 RX ADMIN — ERYTHROPOIETIN 20000 UNITS: 20000 INJECTION, SOLUTION INTRAVENOUS; SUBCUTANEOUS at 10:47

## 2019-12-19 NOTE — PROGRESS NOTES
Supportive plan entered for Procrit 20,000 units to be given today, and one other dose prior to 1/3/20  per communication from SEVERIANO Miranda Dr.   No

## 2019-12-19 NOTE — PROGRESS NOTES
[facility] CBC GROUP OUTPATIENT FOLLOW UP CLINIC VISIT    REASON FOR FOLLOW-UP:    1.  Normocytic anemia secondary to chronic kidney disease stage IV  2.  Faint monoclonal lambda light chain discovered on serum immunofixation.  Labs repeated here did not demonstrate abnormality.    HISTORY OF PRESENT ILLNESS:  Donald Johnson is a 69 y.o. male with the above-mentioned history, who returns the office today for lab review and possible initiation of Procrit.  The patient is followed by nephrology, Dr. Conroy.  The patient had recent CT imaging done that demonstrated enlarging renal mass.  He has an upcoming partial nephrectomy per Dr. Conroy. With recent progression of his anemia, we would like to improve his hemoglobin in preparation.     Patient had recent iron studies performed on December 9, 2019 per nephrology with a ferritin of 86 and iron saturation of greater than 30%.  These records will be requested for our system.    Overall, he is feeling reasonably well.  He does remain fatigued, though denies any associated shortness of breath or bleeding issues.    Labs today are unremarkable with the exception of a hemoglobin of 9.4.    He denies any other concerns.    HEMATOLOGIC HISTORY:  He recently saw Dr. Conroy with nephrology.  Long history of type 2 diabetes and hypertension noted.  Labs showed a faint monoclonal lambda light chain on serum immunofixation.  Urine immunofixation was negative, only showing polyclonal light chains.  Serum protein electrophoresis was negative for a monoclonal protein.  His total protein level on the CMP was 6.0 with an albumin of 3.5.  His calcium was normal at 8.9.  His creatinine was elevated at 2.96.  He was anemic with hemoglobin of 9.7 with hematocrit 26%.  MCV was normal at 92.7.  White blood cells were normal at 7.3 with platelets 208,000.     Previously on 1/18/2016 his creatinine was 2.1 with a hemoglobin of 12.7.      Initial labs here in the office showed a normal  "serum protein electrophoresis with immunofixation on 2/26/2018.  Serum free light chain K/L ratio was normal although both light chains were elevated secondary to his kidney disease.  His erythropoietin level was normal at 7.7.  Creatinine was 2.64.  Iron studies and vitamin B12 are adequate.    ALLERGIES:  Allergies   Allergen Reactions   • Amlodipine Swelling     Worse LE edema.       MEDICATIONS:  The medication list has been reviewed with the patient by the medical assistant, and the list has been updated in the electronic medical record, which I reviewed.  Medication dosages and frequencies were confirmed to be accurate.    I have reviewed the patient's medical history in detail and updated the computerized patient record.    Review of Systems   Constitutional: Positive for fatigue. Negative for appetite change, chills and fever.   HENT:   Negative for trouble swallowing.    Respiratory: Negative for cough and shortness of breath.    Cardiovascular: Negative for chest pain and leg swelling.   Gastrointestinal: Negative for abdominal distention, blood in stool, constipation, diarrhea and nausea.   Musculoskeletal: Positive for back pain (chronic). Negative for arthralgias and myalgias.   Skin: Negative for rash.   Neurological: Negative for dizziness and extremity weakness.   Hematological: Does not bruise/bleed easily.   Review of systems unchanged from previous      Vitals:    12/19/19 0941   BP: 120/56   Pulse: 56   Resp: 16   Temp: 98.1 °F (36.7 °C)   TempSrc: Oral   SpO2: 98%   Weight: 105 kg (231 lb 1.6 oz)   Height: 185.4 cm (72.99\")   PainSc: 0-No pain       PHYSICAL EXAMINATION:  GENERAL:  Well-developed well-nourished male; awake, alert and oriented, in no acute distress.  SKIN: No rash  HEAD:  Normocephalic, atraumatic.  EARS:  Hearing intact.  NOSE:  Septum midline.  No excoriations or nasal discharge.  MOUTH:  No stomatitis or ulcers.  Lips are normal.  THROAT:  Oropharynx without lesions or " exudates.  CHEST:  Lungs are clear to auscultation bilaterally.  No wheezes, rales, or rhonchi.  HEART:  Regular rate; normal rhythm.  No murmurs, gallops or rubs.  EXTREMITIES:  No clubbing cyanosis or edema  Exam unchanged from previous    DIAGNOSTIC DATA:    Results for orders placed or performed in visit on 12/19/19   CBC Auto Differential   Result Value Ref Range    WBC 4.90 3.40 - 10.80 10*3/mm3    RBC 3.01 (L) 4.14 - 5.80 10*6/mm3    Hemoglobin 9.4 (L) 13.0 - 17.7 g/dL    Hematocrit 27.7 (L) 37.5 - 51.0 %    MCV 92.0 79.0 - 97.0 fL    MCH 31.2 26.6 - 33.0 pg    MCHC 33.9 31.5 - 35.7 g/dL    RDW 13.4 12.3 - 15.4 %    RDW-SD 45.3 37.0 - 54.0 fl    MPV 11.2 6.0 - 12.0 fL    Platelets 209 140 - 450 10*3/mm3    Neutrophil % 59.0 42.7 - 76.0 %    Lymphocyte % 18.0 (L) 19.6 - 45.3 %    Monocyte % 10.4 5.0 - 12.0 %    Eosinophil % 11.6 (H) 0.3 - 6.2 %    Basophil % 0.8 0.0 - 1.5 %    Immature Grans % 0.2 0.0 - 0.5 %    Neutrophils, Absolute 2.89 1.70 - 7.00 10*3/mm3    Lymphocytes, Absolute 0.88 0.70 - 3.10 10*3/mm3    Monocytes, Absolute 0.51 0.10 - 0.90 10*3/mm3    Eosinophils, Absolute 0.57 (H) 0.00 - 0.40 10*3/mm3    Basophils, Absolute 0.04 0.00 - 0.20 10*3/mm3    Immature Grans, Absolute 0.01 0.00 - 0.05 10*3/mm3    nRBC 0.0 0.0 - 0.2 /100 WBC     IMAGING: CT abdomen and pelvis 10/22/2019 at [place]    IMPRESSION:  1. The largest indeterminate right lower pole renal mass has increased in size significantly. The differential diagnosis is enlarging solid neoplasm such as renal cell carcinoma versus enlarging hyperdense cyst. Renal ultrasound should be able to   differentiate.  2. The other bilateral indeterminate renal lesions are stable. Some are highly likely to be hyperdense cysts and at least one on the left is probably a simple cyst. The others remain indeterminate.  3. Cardiomegaly with coronary artery atherosclerosis.    ASSESSMENT:  This is a 69 y.o. male with:  1.  Chronic kidney disease stage 3-4.  He  follows with Dr. Conroy with nephrology.  Most recent creatinine performed on December 9, 2019 per nephrology was in the 3 range.  We will be requesting these records  2.  Normocytic anemia related chronic kidney disease: Hemoglobin today remains low at 9.4.  As noted above, recent ferritin and iron profile were adequate.  In preparation for patient's upcoming partial nephrectomy on January 3, we will proceed with initiation of Procrit in hopes that this will increase his hemoglobin.  The patient received 20,000 units today and will return on December 30, 2019 for a possible additional injection of Procrit.  We will plan to have him return in mid January for formal MD evaluation.  3.  Faint monoclonal lambda light chain on serum immunofixation: Clinically insignificant and this was not visible on repeat labs here on 2/26/2018.  Repeat labs 12/6/2018 without significant abnormality.   4.  Renal lesions with a right lower pole renal mass increasing in size from 1.2 cm to 1.8 cm.  There are also some renal cysts present as well.  As noted above, recent CT imaging demonstrated further enlargement of the renal mass.  The patient will undergo partial nephrectomy per Dr. Conroy on January 3      PLAN:  1. Proceed with initiation of Procrit, 20,000 units today for hemoglobin less than 10  2. Patient return on December 30, 2019 for his next dose of Procrit  3. The patient will undergo partial nephrectomy on January 3, 2019  4. The patient return in mid January following recovery from his surgery for formal MD evaluation with STEVEN Chavez

## 2019-12-20 ENCOUNTER — APPOINTMENT (OUTPATIENT)
Dept: PREADMISSION TESTING | Facility: HOSPITAL | Age: 69
End: 2019-12-20

## 2019-12-20 VITALS
DIASTOLIC BLOOD PRESSURE: 60 MMHG | TEMPERATURE: 97.7 F | OXYGEN SATURATION: 98 % | WEIGHT: 233 LBS | HEIGHT: 72 IN | SYSTOLIC BLOOD PRESSURE: 152 MMHG | RESPIRATION RATE: 16 BRPM | HEART RATE: 53 BPM | BODY MASS INDEX: 31.56 KG/M2

## 2019-12-20 LAB
ANION GAP SERPL CALCULATED.3IONS-SCNC: 12.5 MMOL/L (ref 5–15)
BUN BLD-MCNC: 81 MG/DL (ref 8–23)
BUN/CREAT SERPL: 23.5 (ref 7–25)
CALCIUM SPEC-SCNC: 8.8 MG/DL (ref 8.6–10.5)
CHLORIDE SERPL-SCNC: 103 MMOL/L (ref 98–107)
CO2 SERPL-SCNC: 22.5 MMOL/L (ref 22–29)
CREAT BLD-MCNC: 3.44 MG/DL (ref 0.76–1.27)
GFR SERPL CREATININE-BSD FRML MDRD: 18 ML/MIN/1.73
GLUCOSE BLD-MCNC: 197 MG/DL (ref 65–99)
POTASSIUM BLD-SCNC: 4.7 MMOL/L (ref 3.5–5.2)
SODIUM BLD-SCNC: 138 MMOL/L (ref 136–145)

## 2019-12-20 PROCEDURE — 93005 ELECTROCARDIOGRAM TRACING: CPT

## 2019-12-20 PROCEDURE — 80048 BASIC METABOLIC PNL TOTAL CA: CPT | Performed by: UROLOGY

## 2019-12-20 PROCEDURE — 93010 ELECTROCARDIOGRAM REPORT: CPT | Performed by: INTERNAL MEDICINE

## 2019-12-20 NOTE — DISCHARGE INSTRUCTIONS
Take the following medications the morning of surgery with a small sip of water:    CARVEDILOL, HYDRALAZINE, ISOSORBIDE    ARRIVE TO MAIN SURGERY AT 11:30 AM ON 1/3/20    General Instructions:  • Do not eat or drink anything after midnight the night before surgery.  • Infants may have breast milk up to four hours before surgery.  • Infants drinking formula may drink formula up to six hours before surgery.   • Patients who avoid smoking, chewing tobacco and alcohol for 4 weeks prior to surgery have a reduced risk of post-operative complications.  Quit smoking as many days before surgery as you can.  • Do not smoke, use chewing tobacco or drink alcohol the day of surgery.   • If applicable bring your C-PAP/ BI-PAP machine.  • Bring any papers given to you in the doctor’s office.  • Wear clean comfortable clothes.  • Do not wear contact lenses, false eyelashes or make-up.  Bring a case for your glasses.   • Bring crutches or walker if applicable.  • Remove all piercings.  Leave jewelry and any other valuables at home.  • Hair extensions with metal clips must be removed prior to surgery.  • The Pre-Admission Testing nurse will instruct you to bring medications if unable to obtain an accurate list in Pre-Admission Testing.            Preventing a Surgical Site Infection:  • For 2 to 3 days before surgery, avoid shaving with a razor because the razor can irritate skin and make it easier to develop an infection.    • Any areas of open skin can increase the risk of a post-operative wound infection by allowing bacteria to enter and travel throughout the body.  Notify your surgeon if you have any skin wounds / rashes even if it is not near the expected surgical site.  The area will need assessed to determine if surgery should be delayed until it is healed.  • The night prior to surgery sleep in a clean bed with clean clothing.  Do not allow pets to sleep with you.  • Shower on the morning of surgery using a fresh bar of  anti-bacterial soap (such as Dial) and clean washcloth.  Dry with a clean towel and dress in clean clothing.  • Ask your surgeon if you will be receiving antibiotics prior to surgery.  • Make sure you, your family, and all healthcare providers clean their hands with soap and water or an alcohol based hand  before caring for you or your wound.    Day of surgery:  Your arrival time is approximately two hours before your scheduled surgery time.  Upon arrival, a Pre-op nurse and Anesthesiologist will review your health history, obtain vital signs, and answer questions you may have.  The only belongings needed at this time will be your home medications and if applicable your C-PAP/BI-PAP machine.  If you are staying overnight your family can leave the rest of your belongings in the car and bring them to your room later.  A Pre-op nurse will start an IV and you may receive medication in preparation for surgery, including something to help you relax.  Your family will be able to see you in the Pre-op area.  Two visitors at a time will be allowed in the Pre-op room.  While you are in surgery your family should notify the waiting room  if they leave the waiting room area and provide a contact phone number.    Please be aware that surgery does come with discomfort.  We want to make every effort to control your discomfort so please discuss any uncontrolled symptoms with your nurse.   Your doctor will most likely have prescribed pain medications.      If you are going home after surgery you will receive individualized written care instructions before being discharged.  A responsible adult must drive you to and from the hospital on the day of your surgery and stay with you for 24 hours.    If you are staying overnight following surgery, you will be transported to your hospital room following the recovery period.  [facility] has all private rooms.    If you have any questions please call  Pre-Admission Testing at [phone].  Deductibles and co-payments are collected on the day of service. Please be prepared to pay the required co-pay, deductible or deposit on the day of service as defined by your plan.

## 2019-12-23 DIAGNOSIS — N18.4 ANEMIA OF CHRONIC KIDNEY FAILURE, STAGE 4 (SEVERE) (HCC): ICD-10-CM

## 2019-12-23 DIAGNOSIS — D63.1 ANEMIA OF CHRONIC KIDNEY FAILURE, STAGE 4 (SEVERE) (HCC): ICD-10-CM

## 2019-12-30 ENCOUNTER — INFUSION (OUTPATIENT)
Dept: ONCOLOGY | Facility: HOSPITAL | Age: 69
End: 2019-12-30

## 2019-12-30 ENCOUNTER — LAB (OUTPATIENT)
Dept: OTHER | Facility: HOSPITAL | Age: 69
End: 2019-12-30

## 2019-12-30 VITALS
WEIGHT: 238.4 LBS | SYSTOLIC BLOOD PRESSURE: 126 MMHG | BODY MASS INDEX: 32.33 KG/M2 | OXYGEN SATURATION: 96 % | HEART RATE: 54 BPM | DIASTOLIC BLOOD PRESSURE: 52 MMHG | TEMPERATURE: 98.4 F

## 2019-12-30 DIAGNOSIS — N18.4 ANEMIA OF CHRONIC KIDNEY FAILURE, STAGE 4 (SEVERE) (HCC): ICD-10-CM

## 2019-12-30 DIAGNOSIS — D63.1 ANEMIA OF CHRONIC KIDNEY FAILURE, STAGE 4 (SEVERE) (HCC): ICD-10-CM

## 2019-12-30 LAB
BASOPHILS # BLD AUTO: 0.02 10*3/MM3 (ref 0–0.2)
BASOPHILS NFR BLD AUTO: 0.5 % (ref 0–1.5)
DEPRECATED RDW RBC AUTO: 47.3 FL (ref 37–54)
EOSINOPHIL # BLD AUTO: 0.66 10*3/MM3 (ref 0–0.4)
EOSINOPHIL NFR BLD AUTO: 16 % (ref 0.3–6.2)
ERYTHROCYTE [DISTWIDTH] IN BLOOD BY AUTOMATED COUNT: 14 % (ref 12.3–15.4)
HCT VFR BLD AUTO: 30.3 % (ref 37.5–51)
HGB BLD-MCNC: 10.1 G/DL (ref 13–17.7)
IMM GRANULOCYTES # BLD AUTO: 0.02 10*3/MM3 (ref 0–0.05)
IMM GRANULOCYTES NFR BLD AUTO: 0.5 % (ref 0–0.5)
LYMPHOCYTES # BLD AUTO: 0.73 10*3/MM3 (ref 0.7–3.1)
LYMPHOCYTES NFR BLD AUTO: 17.7 % (ref 19.6–45.3)
MCH RBC QN AUTO: 31 PG (ref 26.6–33)
MCHC RBC AUTO-ENTMCNC: 33.3 G/DL (ref 31.5–35.7)
MCV RBC AUTO: 92.9 FL (ref 79–97)
MONOCYTES # BLD AUTO: 0.3 10*3/MM3 (ref 0.1–0.9)
MONOCYTES NFR BLD AUTO: 7.3 % (ref 5–12)
NEUTROPHILS # BLD AUTO: 2.39 10*3/MM3 (ref 1.7–7)
NEUTROPHILS NFR BLD AUTO: 58 % (ref 42.7–76)
NRBC BLD AUTO-RTO: 0 /100 WBC (ref 0–0.2)
PLATELET # BLD AUTO: 206 10*3/MM3 (ref 140–450)
PMV BLD AUTO: 10.8 FL (ref 6–12)
RBC # BLD AUTO: 3.26 10*6/MM3 (ref 4.14–5.8)
WBC NRBC COR # BLD: 4.12 10*3/MM3 (ref 3.4–10.8)

## 2019-12-30 PROCEDURE — 85025 COMPLETE CBC W/AUTO DIFF WBC: CPT | Performed by: INTERNAL MEDICINE

## 2019-12-30 PROCEDURE — 36415 COLL VENOUS BLD VENIPUNCTURE: CPT

## 2019-12-30 NOTE — NURSING NOTE
Lab Results   Component Value Date    WBC 4.12 12/30/2019    HGB 10.1 (L) 12/30/2019    HCT 30.3 (L) 12/30/2019    MCV 92.9 12/30/2019     12/30/2019     Procrit not indicated for Hgb 10.1.  Pt voices no complaints however is going to surgery on Friday.  Pt instructed to call with concerns and was educated on s/s of low hgb including SOB and increased fatigue.  He was also instructed to notify surgeon is symptoms arise on day of surgery. He v/u.

## 2020-01-03 ENCOUNTER — HOSPITAL ENCOUNTER (OUTPATIENT)
Facility: HOSPITAL | Age: 70
Discharge: HOME | End: 2020-01-04
Attending: UROLOGY | Admitting: UROLOGY

## 2020-01-03 ENCOUNTER — ANESTHESIA EVENT (OUTPATIENT)
Dept: PERIOP | Facility: HOSPITAL | Age: 70
End: 2020-01-03

## 2020-01-03 ENCOUNTER — ANESTHESIA (OUTPATIENT)
Dept: PERIOP | Facility: HOSPITAL | Age: 70
End: 2020-01-03

## 2020-01-03 DIAGNOSIS — N28.89 RIGHT RENAL MASS: ICD-10-CM

## 2020-01-03 DIAGNOSIS — N28.89 RENAL MASS, RIGHT: Primary | ICD-10-CM

## 2020-01-03 LAB
GLUCOSE BLDC GLUCOMTR-MCNC: 119 MG/DL (ref 70–130)
GLUCOSE BLDC GLUCOMTR-MCNC: 133 MG/DL (ref 70–130)

## 2020-01-03 PROCEDURE — 25010000002 FENTANYL CITRATE (PF) 100 MCG/2ML SOLUTION: Performed by: NURSE ANESTHETIST, CERTIFIED REGISTERED

## 2020-01-03 PROCEDURE — 25010000002 NEOSTIGMINE PER 0.5 MG: Performed by: ANESTHESIOLOGY

## 2020-01-03 PROCEDURE — 25010000002 ONDANSETRON PER 1 MG: Performed by: ANESTHESIOLOGY

## 2020-01-03 PROCEDURE — 82962 GLUCOSE BLOOD TEST: CPT

## 2020-01-03 PROCEDURE — 25010000002 MIDAZOLAM PER 1 MG: Performed by: ANESTHESIOLOGY

## 2020-01-03 PROCEDURE — 25010000003 CEFAZOLIN IN DEXTROSE 2-4 GM/100ML-% SOLUTION: Performed by: UROLOGY

## 2020-01-03 PROCEDURE — G0378 HOSPITAL OBSERVATION PER HR: HCPCS

## 2020-01-03 PROCEDURE — 88307 TISSUE EXAM BY PATHOLOGIST: CPT | Performed by: UROLOGY

## 2020-01-03 PROCEDURE — 25010000002 HYDROMORPHONE PER 4 MG: Performed by: NURSE ANESTHETIST, CERTIFIED REGISTERED

## 2020-01-03 PROCEDURE — 25010000002 PROPOFOL 10 MG/ML EMULSION: Performed by: NURSE ANESTHETIST, CERTIFIED REGISTERED

## 2020-01-03 RX ORDER — FAMOTIDINE 10 MG/ML
20 INJECTION, SOLUTION INTRAVENOUS ONCE
Status: COMPLETED | OUTPATIENT
Start: 2020-01-03 | End: 2020-01-03

## 2020-01-03 RX ORDER — SODIUM CHLORIDE 9 MG/ML
100 INJECTION, SOLUTION INTRAVENOUS CONTINUOUS
Status: DISCONTINUED | OUTPATIENT
Start: 2020-01-03 | End: 2020-01-04 | Stop reason: HOSPADM

## 2020-01-03 RX ORDER — CARVEDILOL 12.5 MG/1
12.5 TABLET ORAL 2 TIMES DAILY WITH MEALS
Status: DISCONTINUED | OUTPATIENT
Start: 2020-01-03 | End: 2020-01-04 | Stop reason: HOSPADM

## 2020-01-03 RX ORDER — MAGNESIUM HYDROXIDE 1200 MG/15ML
LIQUID ORAL AS NEEDED
Status: DISCONTINUED | OUTPATIENT
Start: 2020-01-03 | End: 2020-01-03 | Stop reason: HOSPADM

## 2020-01-03 RX ORDER — ALBUTEROL SULFATE 90 UG/1
2 AEROSOL, METERED RESPIRATORY (INHALATION)
Status: DISCONTINUED | OUTPATIENT
Start: 2020-01-03 | End: 2020-01-03

## 2020-01-03 RX ORDER — SODIUM CHLORIDE 0.9 % (FLUSH) 0.9 %
10 SYRINGE (ML) INJECTION EVERY 12 HOURS SCHEDULED
Status: DISCONTINUED | OUTPATIENT
Start: 2020-01-03 | End: 2020-01-03 | Stop reason: HOSPADM

## 2020-01-03 RX ORDER — MIDAZOLAM HYDROCHLORIDE 1 MG/ML
1 INJECTION INTRAMUSCULAR; INTRAVENOUS
Status: DISCONTINUED | OUTPATIENT
Start: 2020-01-03 | End: 2020-01-03 | Stop reason: HOSPADM

## 2020-01-03 RX ORDER — FENTANYL CITRATE 50 UG/ML
INJECTION, SOLUTION INTRAMUSCULAR; INTRAVENOUS AS NEEDED
Status: DISCONTINUED | OUTPATIENT
Start: 2020-01-03 | End: 2020-01-03 | Stop reason: SURG

## 2020-01-03 RX ORDER — GLIMEPIRIDE 1 MG/1
1 TABLET ORAL
COMMUNITY

## 2020-01-03 RX ORDER — LIDOCAINE HYDROCHLORIDE 20 MG/ML
INJECTION, SOLUTION INFILTRATION; PERINEURAL AS NEEDED
Status: DISCONTINUED | OUTPATIENT
Start: 2020-01-03 | End: 2020-01-03 | Stop reason: SURG

## 2020-01-03 RX ORDER — HYDROMORPHONE HYDROCHLORIDE 1 MG/ML
0.5 INJECTION, SOLUTION INTRAMUSCULAR; INTRAVENOUS; SUBCUTANEOUS
Status: DISCONTINUED | OUTPATIENT
Start: 2020-01-03 | End: 2020-01-03 | Stop reason: HOSPADM

## 2020-01-03 RX ORDER — ISOSORBIDE MONONITRATE 60 MG/1
120 TABLET, EXTENDED RELEASE ORAL DAILY
Status: DISCONTINUED | OUTPATIENT
Start: 2020-01-03 | End: 2020-01-04 | Stop reason: HOSPADM

## 2020-01-03 RX ORDER — ALBUTEROL SULFATE 2.5 MG/3ML
2.5 SOLUTION RESPIRATORY (INHALATION) EVERY 4 HOURS PRN
Status: DISCONTINUED | OUTPATIENT
Start: 2020-01-03 | End: 2020-01-04 | Stop reason: HOSPADM

## 2020-01-03 RX ORDER — NALOXONE HCL 0.4 MG/ML
0.2 VIAL (ML) INJECTION AS NEEDED
Status: DISCONTINUED | OUTPATIENT
Start: 2020-01-03 | End: 2020-01-03 | Stop reason: HOSPADM

## 2020-01-03 RX ORDER — SODIUM CHLORIDE 0.9 % (FLUSH) 0.9 %
10 SYRINGE (ML) INJECTION AS NEEDED
Status: DISCONTINUED | OUTPATIENT
Start: 2020-01-03 | End: 2020-01-03 | Stop reason: HOSPADM

## 2020-01-03 RX ORDER — DIPHENHYDRAMINE HYDROCHLORIDE 50 MG/ML
12.5 INJECTION INTRAMUSCULAR; INTRAVENOUS
Status: DISCONTINUED | OUTPATIENT
Start: 2020-01-03 | End: 2020-01-03 | Stop reason: HOSPADM

## 2020-01-03 RX ORDER — ALLOPURINOL 300 MG/1
150 TABLET ORAL DAILY
Status: DISCONTINUED | OUTPATIENT
Start: 2020-01-03 | End: 2020-01-04 | Stop reason: HOSPADM

## 2020-01-03 RX ORDER — SODIUM CHLORIDE 9 MG/ML
9 INJECTION, SOLUTION INTRAVENOUS CONTINUOUS PRN
Status: DISCONTINUED | OUTPATIENT
Start: 2020-01-03 | End: 2020-01-03 | Stop reason: HOSPADM

## 2020-01-03 RX ORDER — HYDROCODONE BITARTRATE AND ACETAMINOPHEN 7.5; 325 MG/1; MG/1
1 TABLET ORAL ONCE AS NEEDED
Status: DISCONTINUED | OUTPATIENT
Start: 2020-01-03 | End: 2020-01-03 | Stop reason: HOSPADM

## 2020-01-03 RX ORDER — OXYCODONE AND ACETAMINOPHEN 7.5; 325 MG/1; MG/1
1 TABLET ORAL EVERY 4 HOURS PRN
Status: DISCONTINUED | OUTPATIENT
Start: 2020-01-03 | End: 2020-01-04 | Stop reason: HOSPADM

## 2020-01-03 RX ORDER — HYDROMORPHONE HYDROCHLORIDE 1 MG/ML
0.5 INJECTION, SOLUTION INTRAMUSCULAR; INTRAVENOUS; SUBCUTANEOUS
Status: DISCONTINUED | OUTPATIENT
Start: 2020-01-03 | End: 2020-01-04 | Stop reason: HOSPADM

## 2020-01-03 RX ORDER — NALOXONE HCL 0.4 MG/ML
0.1 VIAL (ML) INJECTION
Status: DISCONTINUED | OUTPATIENT
Start: 2020-01-03 | End: 2020-01-04 | Stop reason: HOSPADM

## 2020-01-03 RX ORDER — DIPHENHYDRAMINE HCL 25 MG
25 CAPSULE ORAL
Status: DISCONTINUED | OUTPATIENT
Start: 2020-01-03 | End: 2020-01-03 | Stop reason: HOSPADM

## 2020-01-03 RX ORDER — HYDRALAZINE HYDROCHLORIDE 50 MG/1
50 TABLET, FILM COATED ORAL 2 TIMES DAILY
Status: DISCONTINUED | OUTPATIENT
Start: 2020-01-03 | End: 2020-01-04 | Stop reason: HOSPADM

## 2020-01-03 RX ORDER — FENTANYL CITRATE 50 UG/ML
50 INJECTION, SOLUTION INTRAMUSCULAR; INTRAVENOUS
Status: DISCONTINUED | OUTPATIENT
Start: 2020-01-03 | End: 2020-01-03 | Stop reason: HOSPADM

## 2020-01-03 RX ORDER — ONDANSETRON 2 MG/ML
4 INJECTION INTRAMUSCULAR; INTRAVENOUS ONCE AS NEEDED
Status: DISCONTINUED | OUTPATIENT
Start: 2020-01-03 | End: 2020-01-03 | Stop reason: HOSPADM

## 2020-01-03 RX ORDER — EPHEDRINE SULFATE 50 MG/ML
5 INJECTION, SOLUTION INTRAVENOUS ONCE AS NEEDED
Status: DISCONTINUED | OUTPATIENT
Start: 2020-01-03 | End: 2020-01-03 | Stop reason: HOSPADM

## 2020-01-03 RX ORDER — LABETALOL HYDROCHLORIDE 5 MG/ML
5 INJECTION, SOLUTION INTRAVENOUS
Status: DISCONTINUED | OUTPATIENT
Start: 2020-01-03 | End: 2020-01-03 | Stop reason: HOSPADM

## 2020-01-03 RX ORDER — ONDANSETRON 2 MG/ML
INJECTION INTRAMUSCULAR; INTRAVENOUS AS NEEDED
Status: DISCONTINUED | OUTPATIENT
Start: 2020-01-03 | End: 2020-01-03 | Stop reason: SURG

## 2020-01-03 RX ORDER — PROMETHAZINE HYDROCHLORIDE 25 MG/ML
12.5 INJECTION, SOLUTION INTRAMUSCULAR; INTRAVENOUS ONCE AS NEEDED
Status: DISCONTINUED | OUTPATIENT
Start: 2020-01-03 | End: 2020-01-03 | Stop reason: HOSPADM

## 2020-01-03 RX ORDER — MIDAZOLAM HYDROCHLORIDE 1 MG/ML
2 INJECTION INTRAMUSCULAR; INTRAVENOUS
Status: DISCONTINUED | OUTPATIENT
Start: 2020-01-03 | End: 2020-01-03 | Stop reason: HOSPADM

## 2020-01-03 RX ORDER — PROPOFOL 10 MG/ML
VIAL (ML) INTRAVENOUS AS NEEDED
Status: DISCONTINUED | OUTPATIENT
Start: 2020-01-03 | End: 2020-01-03 | Stop reason: SURG

## 2020-01-03 RX ORDER — BUPIVACAINE HYDROCHLORIDE 2.5 MG/ML
INJECTION, SOLUTION EPIDURAL; INFILTRATION; INTRACAUDAL AS NEEDED
Status: DISCONTINUED | OUTPATIENT
Start: 2020-01-03 | End: 2020-01-03 | Stop reason: HOSPADM

## 2020-01-03 RX ORDER — CEFAZOLIN SODIUM 2 G/100ML
2 INJECTION, SOLUTION INTRAVENOUS EVERY 8 HOURS
Status: COMPLETED | OUTPATIENT
Start: 2020-01-04 | End: 2020-01-04

## 2020-01-03 RX ORDER — PROMETHAZINE HYDROCHLORIDE 25 MG/1
25 TABLET ORAL ONCE AS NEEDED
Status: DISCONTINUED | OUTPATIENT
Start: 2020-01-03 | End: 2020-01-03 | Stop reason: HOSPADM

## 2020-01-03 RX ORDER — GLIPIZIDE 5 MG/1
2.5 TABLET ORAL
Status: DISCONTINUED | OUTPATIENT
Start: 2020-01-04 | End: 2020-01-04 | Stop reason: HOSPADM

## 2020-01-03 RX ORDER — SENNA AND DOCUSATE SODIUM 50; 8.6 MG/1; MG/1
2 TABLET, FILM COATED ORAL NIGHTLY
Status: DISCONTINUED | OUTPATIENT
Start: 2020-01-03 | End: 2020-01-04 | Stop reason: HOSPADM

## 2020-01-03 RX ORDER — CEFAZOLIN SODIUM 2 G/100ML
2 INJECTION, SOLUTION INTRAVENOUS ONCE
Status: COMPLETED | OUTPATIENT
Start: 2020-01-03 | End: 2020-01-03

## 2020-01-03 RX ORDER — FLUMAZENIL 0.1 MG/ML
0.2 INJECTION INTRAVENOUS AS NEEDED
Status: DISCONTINUED | OUTPATIENT
Start: 2020-01-03 | End: 2020-01-03 | Stop reason: HOSPADM

## 2020-01-03 RX ORDER — SENNA AND DOCUSATE SODIUM 50; 8.6 MG/1; MG/1
2 TABLET, FILM COATED ORAL NIGHTLY
Qty: 60 TABLET | Refills: 0 | Status: SHIPPED | OUTPATIENT
Start: 2020-01-03

## 2020-01-03 RX ORDER — ONDANSETRON 4 MG/1
4 TABLET, FILM COATED ORAL EVERY 6 HOURS PRN
Status: DISCONTINUED | OUTPATIENT
Start: 2020-01-03 | End: 2020-01-04 | Stop reason: HOSPADM

## 2020-01-03 RX ORDER — ROCURONIUM BROMIDE 10 MG/ML
INJECTION, SOLUTION INTRAVENOUS AS NEEDED
Status: DISCONTINUED | OUTPATIENT
Start: 2020-01-03 | End: 2020-01-03 | Stop reason: SURG

## 2020-01-03 RX ORDER — ONDANSETRON 2 MG/ML
4 INJECTION INTRAMUSCULAR; INTRAVENOUS ONCE AS NEEDED
Status: COMPLETED | OUTPATIENT
Start: 2020-01-03 | End: 2020-01-03

## 2020-01-03 RX ORDER — HYDRALAZINE HYDROCHLORIDE 20 MG/ML
5 INJECTION INTRAMUSCULAR; INTRAVENOUS
Status: DISCONTINUED | OUTPATIENT
Start: 2020-01-03 | End: 2020-01-03 | Stop reason: HOSPADM

## 2020-01-03 RX ORDER — GLYCOPYRROLATE 0.2 MG/ML
INJECTION INTRAMUSCULAR; INTRAVENOUS AS NEEDED
Status: DISCONTINUED | OUTPATIENT
Start: 2020-01-03 | End: 2020-01-03 | Stop reason: SURG

## 2020-01-03 RX ORDER — PROMETHAZINE HYDROCHLORIDE 25 MG/1
25 SUPPOSITORY RECTAL ONCE AS NEEDED
Status: DISCONTINUED | OUTPATIENT
Start: 2020-01-03 | End: 2020-01-03 | Stop reason: HOSPADM

## 2020-01-03 RX ORDER — ATORVASTATIN CALCIUM 20 MG/1
40 TABLET, FILM COATED ORAL DAILY
Status: DISCONTINUED | OUTPATIENT
Start: 2020-01-03 | End: 2020-01-04 | Stop reason: HOSPADM

## 2020-01-03 RX ORDER — OXYCODONE AND ACETAMINOPHEN 7.5; 325 MG/1; MG/1
1 TABLET ORAL ONCE AS NEEDED
Status: DISCONTINUED | OUTPATIENT
Start: 2020-01-03 | End: 2020-01-03 | Stop reason: HOSPADM

## 2020-01-03 RX ORDER — ONDANSETRON 2 MG/ML
4 INJECTION INTRAMUSCULAR; INTRAVENOUS EVERY 6 HOURS PRN
Status: DISCONTINUED | OUTPATIENT
Start: 2020-01-03 | End: 2020-01-04 | Stop reason: HOSPADM

## 2020-01-03 RX ORDER — EPHEDRINE SULFATE 50 MG/ML
INJECTION, SOLUTION INTRAVENOUS AS NEEDED
Status: DISCONTINUED | OUTPATIENT
Start: 2020-01-03 | End: 2020-01-03 | Stop reason: SURG

## 2020-01-03 RX ORDER — TORSEMIDE 10 MG/1
10 TABLET ORAL DAILY
Status: DISCONTINUED | OUTPATIENT
Start: 2020-01-03 | End: 2020-01-04 | Stop reason: HOSPADM

## 2020-01-03 RX ORDER — ACETAMINOPHEN 325 MG/1
650 TABLET ORAL ONCE AS NEEDED
Status: DISCONTINUED | OUTPATIENT
Start: 2020-01-03 | End: 2020-01-03 | Stop reason: HOSPADM

## 2020-01-03 RX ORDER — PROMETHAZINE HYDROCHLORIDE 25 MG/ML
6.25 INJECTION, SOLUTION INTRAMUSCULAR; INTRAVENOUS
Status: DISCONTINUED | OUTPATIENT
Start: 2020-01-03 | End: 2020-01-03 | Stop reason: HOSPADM

## 2020-01-03 RX ORDER — SODIUM CHLORIDE 9 MG/ML
INJECTION, SOLUTION INTRAVENOUS AS NEEDED
Status: DISCONTINUED | OUTPATIENT
Start: 2020-01-03 | End: 2020-01-03 | Stop reason: HOSPADM

## 2020-01-03 RX ORDER — OXYCODONE AND ACETAMINOPHEN 7.5; 325 MG/1; MG/1
1 TABLET ORAL EVERY 4 HOURS PRN
Qty: 30 TABLET | Refills: 0 | Status: SHIPPED | OUTPATIENT
Start: 2020-01-03 | End: 2020-01-13

## 2020-01-03 RX ADMIN — MIDAZOLAM 1 MG: 1 INJECTION INTRAMUSCULAR; INTRAVENOUS at 13:34

## 2020-01-03 RX ADMIN — ONDANSETRON 4 MG: 2 INJECTION INTRAMUSCULAR; INTRAVENOUS at 13:11

## 2020-01-03 RX ADMIN — FENTANYL CITRATE 100 MCG: 50 INJECTION INTRAMUSCULAR; INTRAVENOUS at 13:52

## 2020-01-03 RX ADMIN — GLYCOPYRROLATE 0.4 MG: 0.2 INJECTION INTRAMUSCULAR; INTRAVENOUS at 14:56

## 2020-01-03 RX ADMIN — ROCURONIUM BROMIDE 50 MG: 10 INJECTION INTRAVENOUS at 13:52

## 2020-01-03 RX ADMIN — EPHEDRINE SULFATE 10 MG: 50 INJECTION INTRAVENOUS at 14:49

## 2020-01-03 RX ADMIN — FENTANYL CITRATE 50 MCG: 50 INJECTION, SOLUTION INTRAMUSCULAR; INTRAVENOUS at 15:25

## 2020-01-03 RX ADMIN — ATORVASTATIN CALCIUM 40 MG: 20 TABLET, FILM COATED ORAL at 21:31

## 2020-01-03 RX ADMIN — SODIUM CHLORIDE 9 ML/HR: 9 INJECTION, SOLUTION INTRAVENOUS at 13:14

## 2020-01-03 RX ADMIN — MIDAZOLAM 1 MG: 1 INJECTION INTRAMUSCULAR; INTRAVENOUS at 13:11

## 2020-01-03 RX ADMIN — PROPOFOL 200 MG: 10 INJECTION, EMULSION INTRAVENOUS at 13:52

## 2020-01-03 RX ADMIN — CEFAZOLIN SODIUM 2 G: 2 INJECTION, SOLUTION INTRAVENOUS at 13:57

## 2020-01-03 RX ADMIN — FENTANYL CITRATE 50 MCG: 50 INJECTION INTRAMUSCULAR; INTRAVENOUS at 14:19

## 2020-01-03 RX ADMIN — CEFAZOLIN SODIUM 2 G: 2 INJECTION, SOLUTION INTRAVENOUS at 23:40

## 2020-01-03 RX ADMIN — ONDANSETRON HYDROCHLORIDE 4 MG: 2 SOLUTION INTRAMUSCULAR; INTRAVENOUS at 14:52

## 2020-01-03 RX ADMIN — LIDOCAINE HYDROCHLORIDE 100 MG: 20 INJECTION, SOLUTION INFILTRATION; PERINEURAL at 13:52

## 2020-01-03 RX ADMIN — FENTANYL CITRATE 50 MCG: 50 INJECTION, SOLUTION INTRAMUSCULAR; INTRAVENOUS at 16:20

## 2020-01-03 RX ADMIN — FENTANYL CITRATE 50 MCG: 50 INJECTION, SOLUTION INTRAMUSCULAR; INTRAVENOUS at 15:35

## 2020-01-03 RX ADMIN — FAMOTIDINE 20 MG: 10 INJECTION INTRAVENOUS at 13:11

## 2020-01-03 RX ADMIN — SODIUM CHLORIDE 100 ML/HR: 9 INJECTION, SOLUTION INTRAVENOUS at 18:02

## 2020-01-03 RX ADMIN — GLYCOPYRROLATE 0.2 MG: 0.2 INJECTION INTRAMUSCULAR; INTRAVENOUS at 13:52

## 2020-01-03 RX ADMIN — NEOSTIGMINE METHYLSULFATE 3 MG: 1 INJECTION INTRAMUSCULAR; INTRAVENOUS; SUBCUTANEOUS at 14:56

## 2020-01-03 RX ADMIN — SENNOSIDES AND DOCUSATE SODIUM 2 TABLET: 8.6; 5 TABLET ORAL at 21:49

## 2020-01-03 RX ADMIN — ALLOPURINOL 150 MG: 300 TABLET ORAL at 21:32

## 2020-01-03 RX ADMIN — EPHEDRINE SULFATE 10 MG: 50 INJECTION INTRAVENOUS at 14:34

## 2020-01-03 RX ADMIN — HYDROMORPHONE HYDROCHLORIDE 0.5 MG: 1 INJECTION, SOLUTION INTRAMUSCULAR; INTRAVENOUS; SUBCUTANEOUS at 16:20

## 2020-01-03 RX ADMIN — HYDROMORPHONE HYDROCHLORIDE 0.5 MG: 1 INJECTION, SOLUTION INTRAMUSCULAR; INTRAVENOUS; SUBCUTANEOUS at 15:30

## 2020-01-03 RX ADMIN — OXYCODONE HYDROCHLORIDE AND ACETAMINOPHEN 1 TABLET: 7.5; 325 TABLET ORAL at 17:54

## 2020-01-03 NOTE — PLAN OF CARE
Problem: Patient Care Overview  Goal: Plan of Care Review  Outcome: Ongoing (interventions implemented as appropriate)  Flowsheets (Taken 1/3/2020 1841)  Progress: improving  Outcome Summary: VSS. Pt bradycardic in 50's asymptomatic. Pt came to [facility] from surgery for R partial nephrectomy. 3x lap sites c/d/i. Barboza w/ red serosanguineous drainage. FREDY drain in RLQ w. minimal output. Percocet given x1 for lower back pain.

## 2020-01-03 NOTE — H&P
[facility] Surgical History and Physical    Patient Care Team:  Natanael Marshall MD as PCP - General (Pediatrics)  Gracie Conroy MD as Referring Physician (Nephrology)  Pollo Hernandez MD as Consulting Physician (Hematology and Oncology)    Chief complaint right renal mass    Subjective     Patient is a 69 y.o. male presents with 1.5 cm mass in the right lower pole kidney largely exophytic here for partial nephrectomy.  He has some contralateral renal cysts involving the left kidney the look relatively benign.  He has chronic kidney disease under the excellent care of Dr. Gracie Conroy.     Review of Systems   Pertinent items are noted in HPI, all other systems reviewed and negative    Past Medical History:   Diagnosis Date   • Anemia    • Arthritis    • At risk for obstructive sleep apnea     stop bang #5   • Cancer (CMS/HCC) 2017    Left cheek, nonmelanoma   • Chronic kidney disease    • Coarctation of aorta     very mild, likely not hemodynamically significant   • DDD (degenerative disc disease), lumbosacral    • Diabetes mellitus (CMS/HCC)     Type 2   • H/O Chronic gout    • H/O Lung mass 2016   • History of cardiac cath     7/2013 with luminal irregularities, normal EF, normal LVEDP   • Hyperlipidemia    • Hypertension    • Monoclonal gammopathy 2018   • Obesity    • PAD (peripheral artery disease) (CMS/HCC)    • PONV (postoperative nausea and vomiting)    • Right kidney mass    • Splenic artery aneurysm (CMS/HCC)      Past Surgical History:   Procedure Laterality Date   • ACHILLES TENDON SURGERY  1991   • CARDIAC CATHETERIZATION     • CHOLECYSTECTOMY     • EPIDURAL BLOCK     • MOHS SURGERY Left 07/20/2017    Cheek   • SKIN CANCER EXCISION  2014    Top of head x2   • TONSILLECTOMY       Family History   Problem Relation Age of Onset   • Coronary artery disease Father         ARTERIOSCLEROSIS NONPREMATURE   • Heart disease Father    • Hypertension Father    • Diabetes Father    •  Gallbladder disease Father    • Heart attack Father    • Hypertension Mother    • Diabetes Mother    • Heart disease Mother    • Diabetes Sister    • Heart disease Sister    • Diabetes Brother    • Gallbladder disease Brother    • Heart disease Brother    • Hypertension Brother    • Malig Hyperthermia Neg Hx      Social History     Tobacco Use   • Smoking status: Passive Smoke Exposure - Never Smoker   • Smokeless tobacco: Never Used   Substance Use Topics   • Alcohol use: No   • Drug use: No       Meds:  Medications Prior to Admission   Medication Sig Dispense Refill Last Dose   • allopurinol (ZYLOPRIM) 300 MG tablet Take 0.5 tablets by mouth Daily. 150mg daily   1/2/2020 at 1800   • atorvastatin (LIPITOR) 40 MG tablet Take 1 tablet by mouth daily.   1/2/2020 at 0630   • B Complex-C-Folic Acid (ROXI-YULISSA) tablet Take 1 tablet by mouth Daily.   1/2/2020 at 0630   • carvedilol (COREG) 12.5 MG tablet Take 12.5 mg by mouth 2 (Two) Times a Day.   1/3/2020 at 0630   • glimepiride (AMARYL) 1 MG tablet Take 1 mg by mouth Every Morning Before Breakfast.   1/2/2020 at 0630   • hydrALAZINE (APRESOLINE) 50 MG tablet Take 50 mg by mouth 2 (Two) Times a Day.  0 1/3/2020 at 0630   • isosorbide mononitrate (IMDUR) 120 MG 24 hr tablet Take 120 mg by mouth Daily.   1/3/2020 at 0630   • potassium chloride (K-DUR,KLOR-CON) 20 MEQ CR tablet Take 10 mEq by mouth Daily.   1/2/2020 at 0630   • torsemide (DEMADEX) 20 MG tablet Take 10 mg by mouth Daily. ONLY MON/WED/FRI 1/2/2020 at 0630   • fluticasone (FLONASE) 50 MCG/ACT nasal spray SHAKE LIQUID AND USE 2 SPRAYS IN EACH NOSTRIL DAILY   More than a month at Unknown time   • FREESTYLE LITE test strip USE ONE STRIP AS DIRECTED ONCE DAILY  3 Taking   • Lancets (FREESTYLE) lancets INJECT 1 INTO THE SKIN EVERY DAY   Taking   • PROAIR  (90 Base) MCG/ACT inhaler INHALE 2 PUFFS INTO THE LUNGS Q 4 H PRN FOR FOR WHEEZING AND SHORTNESS OF AIR  0 More than a month at Unknown time  "      Allergies:  Amlodipine    Debilities:  None noted    Objective     Vital Signs  Temp:  [98.5 °F (36.9 °C)] 98.5 °F (36.9 °C)  Heart Rate:  [52-58] 52  Resp:  [16-18] 16  BP: (174)/(79) 174/79  No intake or output data in the 24 hours ending 01/03/20 1332  Flowsheet Rows      First Filed Value   Admission Height  182.9 cm (72\") Documented at 01/03/2020 1147   Admission Weight  104 kg (230 lb) Documented at 01/03/2020 1229           Physical Exam:     General Appearance:    Alert, cooperative, NAD   HEENT:    No trauma, pupils reactive, hearing intact   Back:     No CVA tenderness   Lungs:     Respirations unlabored, no wheezing    Heart:    RRR, intact peripheral pulses   Abdomen:     Soft, NDNT, no masses, no guarding   :   Penis normal testes normal   Extremities:   No edema, no deformity   Lymphatic:   No neck or groin LAD   Skin:   No bleeding, bruising or rashes   Neuro/Psych:   Orientation intact, mood/affect pleasant, no focal findings     Results Review:    I reviewed the patient's new clinical results.  Results for orders placed or performed during the hospital encounter of 01/03/20   POC Glucose Once   Result Value Ref Range    Glucose 119 70 - 130 mg/dL        Assessment:  Right lower pole renal mass    Plan:    Right laparoscopic partial nephrectomy    I discussed the patients findings and my recommendations with patient.   Risks, complications, outcomes and alternatives discussed with the patient at the bedside and office.    Ean Chavez MD  01/03/20  1:32 PM          "

## 2020-01-03 NOTE — ANESTHESIA PREPROCEDURE EVALUATION
Anesthesia Evaluation     Patient summary reviewed and Nursing notes reviewed   history of anesthetic complications: PONV  NPO Solid Status: > 6 hours  NPO Liquid Status: > 6 hours           Airway   Mallampati: II  TM distance: >3 FB  Neck ROM: full  no difficulty expected and No difficulty expected  Dental - normal exam     Pulmonary - negative pulmonary ROS and normal exam    breath sounds clear to auscultation  (-) rhonchi, decreased breath sounds, wheezes, rales, stridor  Cardiovascular - normal exam    NYHA Classification: I  ECG reviewed  Patient on routine beta blocker and Beta blocker given within 24 hours of surgery  Rhythm: regular  Rate: normal    (+) hypertension, PVD, hyperlipidemia,   (-) murmur, weak pulses, friction rub, systolic click, carotid bruits, JVD, peripheral edema      Neuro/Psych- negative ROS  GI/Hepatic/Renal/Endo    (+) obesity,   renal disease CRI, diabetes mellitus type 2 well controlled,     Musculoskeletal (-) negative ROS    Abdominal  - normal exam    Abdomen: soft.   Substance History - negative use     OB/GYN negative ob/gyn ROS         Other      history of cancer active                    Anesthesia Plan    ASA 3     general     intravenous induction     Anesthetic plan, all risks, benefits, and alternatives have been provided, discussed and informed consent has been obtained with: patient.

## 2020-01-03 NOTE — ANESTHESIA PROCEDURE NOTES
Airway  Urgency: elective    Date/Time: 1/3/2020 1:56 PM  Airway not difficult    General Information and Staff    Patient location during procedure: OR  Anesthesiologist: Ean Martinez MD  CRNA: Dalila Titus CRNA    Indications and Patient Condition  Indications for airway management: airway protection    Preoxygenated: yes  Mask difficulty assessment: 2 - vent by mask + OA or adjuvant +/- NMBA    Final Airway Details  Final airway type: endotracheal airway      Successful airway: ETT  Cuffed: yes   Successful intubation technique: direct laryngoscopy  Facilitating devices/methods: cricoid pressure  Endotracheal tube insertion site: oral  Blade: Dann  Blade size: 3  ETT size (mm): 7.5  Cormack-Lehane Classification: grade I - full view of glottis  Placement verified by: chest auscultation and capnometry   Cuff volume (mL): 8  Measured from: lips  ETT/EBT  to lips (cm): 24  Number of attempts at approach: 1  Assessment: lips, teeth, and gum same as pre-op and atraumatic intubation

## 2020-01-03 NOTE — ANESTHESIA POSTPROCEDURE EVALUATION
"Patient: Donald Johnson    Procedure Summary     Date:  01/03/20 Room / Location:  [facility] OR 02 / [facility] MAIN OR    Anesthesia Start:  1348 Anesthesia Stop:  1517    Procedure:  RIGHT LAPAROSCOPIC PARTIAL  NEPHRECTOMY (Right Abdomen) Diagnosis:       Renal mass, right      (Right renal mass)    Surgeon:  Ean Chavez MD Provider:  Ean Martinez MD    Anesthesia Type:  general ASA Status:  3          Anesthesia Type: general    Vitals  Vitals Value Taken Time   /57 1/3/2020  5:05 PM   Temp 36.8 °C (98.3 °F) 1/3/2020  3:15 PM   Pulse 50 1/3/2020  5:17 PM   Resp 16 1/3/2020  5:05 PM   SpO2 99 % 1/3/2020  5:17 PM   Vitals shown include unvalidated device data.        Post Anesthesia Care and Evaluation    Patient location during evaluation: bedside  Patient participation: complete - patient participated  Level of consciousness: awake and alert  Pain management: adequate  Airway patency: patent  Anesthetic complications: No anesthetic complications    Cardiovascular status: acceptable  Respiratory status: acceptable  Hydration status: acceptable    Comments: /57   Pulse (!) 49   Temp 36.8 °C (98.3 °F) (Oral)   Resp 16   Ht 182.9 cm (72\")   Wt 104 kg (230 lb)   SpO2 99%   BMI 31.19 kg/m²           "

## 2020-01-03 NOTE — OP NOTE
NEPHRECTOMY PARTIAL LAPAROSCOPIC  Procedure Note    Donald Johnson  1/3/2020    Pre-op Diagnosis:   Right renal mass    Post-op Diagnosis:     Post-Op Diagnosis Codes:     * Renal mass, right [N28.89]    Procedure(s):  RIGHT LAPAROSCOPIC PARTIAL  NEPHRECTOMY    Surgeon(s):  Ean Chavez MD    Anesthesia: General    Staff:   Circulator: Jody Farooq RN  Scrub Person: Dalila Jaime; Srikanth Pierre  Vendor Representative: Shantel Ramirez  Assistant: Gracie Cazares RN    Estimated Blood Loss: 300 mL    Specimens:                Order Name Source Comment Collection Info Order Time   TISSUE PATHOLOGY EXAM Kidney, Right  Collected By: Ean Chavez MD 1/3/2020  2:55 PM         Drains:   Closed/Suction Drain 1 Lateral RLQ Bulb 15 Fr. (Active)       Urethral Catheter Double-lumen 16 Fr. (Active)       Findings: Exophytic mass right lower pole kidney completely excised with very minimal resection of normal renal parenchyma    Complications: None apparent    Indications: 69-year-old gentleman with a    Right renal mass for partial nephrectomy.  The mass appears to be solid.    Procedure: Patient was taken off suite given general anesthesia.  Placed in comfortable supine position where Barboza cath was placed.  Then flipped to the right flank position.  Kidney rest was not utilized the table slightly flexed.  All areas were properly padded.  Axillary roll was placed.  Secured to the table.  He was prepped and draped in sterile fashion.  Timeout was performed.  12th rib incision was made retroperitoneal space was digitally entered and then balloon dissection was performed to open up our space.  I used a 4 car technique with a 5 mm at the tip the 11th rib a 12 mm at the anterior axillary line and then another 5 mm above the 12th rib.  At the tip of the 12th rib a another balloon trocar was placed that was 10 mm.  During dissection we end up snapping this 12th rib as it broke readily just by  manipulation of our trochars and did not see any apparent bleeding.  It was pretty friable rib.  We have started our pneumoretroperitoneum and began dissecting out the retroperitoneal space.  Gerota's fascia was incised and the kidney was mobilized.  His perirenal fat was densely adherent to the kidney and did not strip off easily.  I did not open up the subcapsular area but I was able to easily see the mass in the lower pole kidney.  He is harmonic scalpel I then dissected out the mass off of the kidney and then I began excising underneath it.  It was largely exophytic so I had to excise and resected very little normal renal tissue.  The mass was placed in a laparoscopic retrieval bag and pulled out.  I briefly looked at it had a very unusual appearance certainly not classic renal cell carcinoma and it certainly did not look like a cyst on my view.  I felt prompt comfort got everything out.  I got hemostasis and placed some FloSeal on the defect with some Surgicel and watch it for another 10 minutes his own could appreciate any bleeding at low CO2 pressures.  A 15 M[place] Ren drain was placed secured with 2-0 silk.  All of our trochars removed with our specimen as well.  The 2 larger incisions were closed with figure-of-eight 0 Vicryl and the skin wounds were closed with 4-0 Vicryl subcuticular.  Dressings were applied Marcaine was injected he was taken recovery stable addition.      Ean Chavez MD     Date: 1/3/2020  Time: 3:24 PM

## 2020-01-04 VITALS
OXYGEN SATURATION: 98 % | HEIGHT: 72 IN | TEMPERATURE: 97.8 F | HEART RATE: 65 BPM | SYSTOLIC BLOOD PRESSURE: 118 MMHG | RESPIRATION RATE: 16 BRPM | BODY MASS INDEX: 31.15 KG/M2 | DIASTOLIC BLOOD PRESSURE: 53 MMHG | WEIGHT: 230 LBS

## 2020-01-04 LAB
ANION GAP SERPL CALCULATED.3IONS-SCNC: 10.6 MMOL/L (ref 5–15)
BUN BLD-MCNC: 69 MG/DL (ref 8–23)
BUN/CREAT SERPL: 17.6 (ref 7–25)
CALCIUM SPEC-SCNC: 7.9 MG/DL (ref 8.6–10.5)
CHLORIDE SERPL-SCNC: 103 MMOL/L (ref 98–107)
CO2 SERPL-SCNC: 20.4 MMOL/L (ref 22–29)
CREAT BLD-MCNC: 3.92 MG/DL (ref 0.76–1.27)
DEPRECATED RDW RBC AUTO: 45.9 FL (ref 37–54)
ERYTHROCYTE [DISTWIDTH] IN BLOOD BY AUTOMATED COUNT: 13.1 % (ref 12.3–15.4)
GFR SERPL CREATININE-BSD FRML MDRD: 15 ML/MIN/1.73
GLUCOSE BLD-MCNC: 135 MG/DL (ref 65–99)
HCT VFR BLD AUTO: 21.5 % (ref 37.5–51)
HGB BLD-MCNC: 7 G/DL (ref 13–17.7)
MCH RBC QN AUTO: 31.3 PG (ref 26.6–33)
MCHC RBC AUTO-ENTMCNC: 32.6 G/DL (ref 31.5–35.7)
MCV RBC AUTO: 96 FL (ref 79–97)
PLATELET # BLD AUTO: 189 10*3/MM3 (ref 140–450)
PMV BLD AUTO: 10.9 FL (ref 6–12)
POTASSIUM BLD-SCNC: 4.9 MMOL/L (ref 3.5–5.2)
RBC # BLD AUTO: 2.24 10*6/MM3 (ref 4.14–5.8)
SODIUM BLD-SCNC: 134 MMOL/L (ref 136–145)
WBC NRBC COR # BLD: 5.46 10*3/MM3 (ref 3.4–10.8)

## 2020-01-04 PROCEDURE — 25010000003 CEFAZOLIN IN DEXTROSE 2-4 GM/100ML-% SOLUTION: Performed by: UROLOGY

## 2020-01-04 PROCEDURE — 80048 BASIC METABOLIC PNL TOTAL CA: CPT | Performed by: UROLOGY

## 2020-01-04 PROCEDURE — G0378 HOSPITAL OBSERVATION PER HR: HCPCS

## 2020-01-04 PROCEDURE — 85027 COMPLETE CBC AUTOMATED: CPT | Performed by: UROLOGY

## 2020-01-04 RX ORDER — CEPHALEXIN 500 MG/1
500 CAPSULE ORAL 2 TIMES DAILY
Qty: 10 CAPSULE | Refills: 0 | Status: SHIPPED | OUTPATIENT
Start: 2020-01-04 | End: 2020-01-09

## 2020-01-04 RX ADMIN — CARVEDILOL 12.5 MG: 12.5 TABLET, FILM COATED ORAL at 08:38

## 2020-01-04 RX ADMIN — CEFAZOLIN SODIUM 2 G: 2 INJECTION, SOLUTION INTRAVENOUS at 08:31

## 2020-01-04 RX ADMIN — GLIPIZIDE 2.5 MG: 5 TABLET ORAL at 07:38

## 2020-01-04 RX ADMIN — ATORVASTATIN CALCIUM 40 MG: 20 TABLET, FILM COATED ORAL at 08:36

## 2020-01-04 NOTE — PLAN OF CARE
Problem: Patient Care Overview  Goal: Plan of Care Review  Outcome: Ongoing (interventions implemented as appropriate)  Flowsheets  Taken 1/4/2020 0612 by Allyssa Myers, RN  Progress: no change  Outcome Summary: Pt slightly bahman/hypotensive, held antihypertensives. Due to ambulate this AM, Barboza out this AM, due to void by 0930. No pain interventions requested. FREDY w/ small OP. 3 lap sites c/d/i.

## 2020-01-04 NOTE — PROGRESS NOTES
[facility] Progress Note    POD #1 Rt LPN    Minimal pain  Barboza out this morning  No dysuria    Urine is dark  Denies f/c/n/v  FREDY drain with minimal output  Incisions CDI  Abd NT ND    Discussed DC instructions  Pt may DC home this pm

## 2020-01-04 NOTE — DISCHARGE SUMMARY
Date of admission:  1/3/2020   Date of discharge:     Admitting diagnosis:  Rt renal mass   Discharge diagnosis:  Same      Procedures:  Rt LPN 1/3/19    Hospital course:  POD #1 Rt LPN. Pt met all DC criteria on POD #1 and may DC home.    Discharge medications:       Discharge Medications      New Medications      Instructions Start Date   oxyCODONE-acetaminophen 7.5-325 MG per tablet  Commonly known as:  PERCOCET   1 tablet, Oral, Every 4 Hours PRN      senna-docusate sodium 8.6-50 MG tablet  Commonly known as:  SENOKOT-S   2 tablets, Oral, Nightly         Continue These Medications      Instructions Start Date   allopurinol 300 MG tablet  Commonly known as:  ZYLOPRIM   0.5 tablets, Oral, Daily, 150mg daily      atorvastatin 40 MG tablet  Commonly known as:  LIPITOR   1 tablet, Oral, Daily      carvedilol 12.5 MG tablet  Commonly known as:  COREG   12.5 mg, Oral, 2 Times Daily      freestyle lancets   INJECT 1 INTO THE SKIN EVERY DAY      FREESTYLE LITE test strip  Generic drug:  glucose blood   USE ONE STRIP AS DIRECTED ONCE DAILY      glimepiride 1 MG tablet  Commonly known as:  AMARYL   1 mg, Oral, Every Morning Before Breakfast      hydrALAZINE 50 MG tablet  Commonly known as:  APRESOLINE   50 mg, Oral, 2 Times Daily      isosorbide mononitrate 120 MG 24 hr tablet  Commonly known as:  IMDUR   120 mg, Oral, Daily      potassium chloride 20 MEQ CR tablet  Commonly known as:  K-DUR,KLOR-CON   10 mEq, Oral, Daily      PROAIR  (90 Base) MCG/ACT inhaler  Generic drug:  albuterol sulfate HFA   INHALE 2 PUFFS INTO THE LUNGS Q 4 H PRN FOR FOR WHEEZING AND SHORTNESS OF AIR      ROXI-YULISSA tablet   1 tablet, Oral, Daily      torsemide 20 MG tablet  Commonly known as:  DEMADEX   10 mg, Oral, Daily, ONLY MON/WED/FRI

## 2020-01-14 DIAGNOSIS — N18.4 ANEMIA OF CHRONIC KIDNEY FAILURE, STAGE 4 (SEVERE) (HCC): Primary | ICD-10-CM

## 2020-01-14 DIAGNOSIS — D63.1 ANEMIA OF CHRONIC KIDNEY FAILURE, STAGE 4 (SEVERE) (HCC): Primary | ICD-10-CM

## 2020-01-17 ENCOUNTER — INFUSION (OUTPATIENT)
Dept: ONCOLOGY | Facility: HOSPITAL | Age: 70
End: 2020-01-17

## 2020-01-17 ENCOUNTER — OFFICE VISIT (OUTPATIENT)
Dept: ONCOLOGY | Facility: CLINIC | Age: 70
End: 2020-01-17

## 2020-01-17 ENCOUNTER — LAB (OUTPATIENT)
Dept: OTHER | Facility: HOSPITAL | Age: 70
End: 2020-01-17

## 2020-01-17 VITALS
HEIGHT: 72 IN | SYSTOLIC BLOOD PRESSURE: 127 MMHG | RESPIRATION RATE: 16 BRPM | DIASTOLIC BLOOD PRESSURE: 55 MMHG | BODY MASS INDEX: 31.21 KG/M2 | OXYGEN SATURATION: 99 % | WEIGHT: 230.4 LBS | HEART RATE: 72 BPM | TEMPERATURE: 98.1 F

## 2020-01-17 DIAGNOSIS — N18.4 ANEMIA OF CHRONIC KIDNEY FAILURE, STAGE 4 (SEVERE) (HCC): Primary | ICD-10-CM

## 2020-01-17 DIAGNOSIS — N18.4 ANEMIA OF CHRONIC KIDNEY FAILURE, STAGE 4 (SEVERE) (HCC): ICD-10-CM

## 2020-01-17 DIAGNOSIS — D63.1 ANEMIA OF CHRONIC KIDNEY FAILURE, STAGE 4 (SEVERE) (HCC): ICD-10-CM

## 2020-01-17 DIAGNOSIS — D63.1 ANEMIA OF CHRONIC KIDNEY FAILURE, STAGE 4 (SEVERE) (HCC): Primary | ICD-10-CM

## 2020-01-17 DIAGNOSIS — D62 ACUTE BLOOD LOSS ANEMIA: ICD-10-CM

## 2020-01-17 DIAGNOSIS — N28.89 RENAL MASS, RIGHT: Primary | ICD-10-CM

## 2020-01-17 LAB
ABO GROUP BLD: NORMAL
ALBUMIN SERPL-MCNC: 3.1 G/DL (ref 3.5–5.2)
ALBUMIN/GLOB SERPL: 1 G/DL
ALP SERPL-CCNC: 172 U/L (ref 39–117)
ALT SERPL W P-5'-P-CCNC: 27 U/L (ref 1–41)
ANION GAP SERPL CALCULATED.3IONS-SCNC: 12 MMOL/L (ref 5–15)
AST SERPL-CCNC: 27 U/L (ref 1–40)
BASOPHILS # BLD AUTO: 0.03 10*3/MM3 (ref 0–0.2)
BASOPHILS NFR BLD AUTO: 0.5 % (ref 0–1.5)
BILIRUB SERPL-MCNC: 0.5 MG/DL (ref 0.1–1.2)
BLD GP AB SCN SERPL QL: NEGATIVE
BUN BLD-MCNC: 92 MG/DL (ref 8–23)
BUN/CREAT SERPL: 24.1 (ref 7–25)
CALCIUM SPEC-SCNC: 8.7 MG/DL (ref 8.6–10.5)
CHLORIDE SERPL-SCNC: 101 MMOL/L (ref 98–107)
CO2 SERPL-SCNC: 19 MMOL/L (ref 22–29)
CREAT BLD-MCNC: 3.81 MG/DL (ref 0.76–1.27)
CYTO UR: NORMAL
DEPRECATED RDW RBC AUTO: 44.6 FL (ref 37–54)
DX PRELIMINARY: NORMAL
EOSINOPHIL # BLD AUTO: 0.55 10*3/MM3 (ref 0–0.4)
EOSINOPHIL NFR BLD AUTO: 8.4 % (ref 0.3–6.2)
ERYTHROCYTE [DISTWIDTH] IN BLOOD BY AUTOMATED COUNT: 13.3 % (ref 12.3–15.4)
FERRITIN SERPL-MCNC: 222 NG/ML (ref 30–400)
GFR SERPL CREATININE-BSD FRML MDRD: 16 ML/MIN/1.73
GLOBULIN UR ELPH-MCNC: 3 GM/DL
GLUCOSE BLD-MCNC: 145 MG/DL (ref 65–99)
HCT VFR BLD AUTO: 20.1 % (ref 37.5–51)
HGB BLD-MCNC: 6.8 G/DL (ref 13–17.7)
HGB RETIC QN AUTO: 32.6 PG (ref 29.8–36.1)
IMM GRANULOCYTES # BLD AUTO: 0.02 10*3/MM3 (ref 0–0.05)
IMM GRANULOCYTES NFR BLD AUTO: 0.3 % (ref 0–0.5)
IMM RETICS NFR: 12.7 % (ref 3–15.8)
IRON 24H UR-MRATE: 37 MCG/DL (ref 59–158)
IRON SATN MFR SERPL: 14 % (ref 20–50)
LAB AP CASE REPORT: NORMAL
LAB AP DIAGNOSIS COMMENT: NORMAL
LYMPHOCYTES # BLD AUTO: 0.65 10*3/MM3 (ref 0.7–3.1)
LYMPHOCYTES NFR BLD AUTO: 9.9 % (ref 19.6–45.3)
MCH RBC QN AUTO: 31.2 PG (ref 26.6–33)
MCHC RBC AUTO-ENTMCNC: 33.8 G/DL (ref 31.5–35.7)
MCV RBC AUTO: 92.2 FL (ref 79–97)
MONOCYTES # BLD AUTO: 0.67 10*3/MM3 (ref 0.1–0.9)
MONOCYTES NFR BLD AUTO: 10.2 % (ref 5–12)
NEUTROPHILS # BLD AUTO: 4.63 10*3/MM3 (ref 1.7–7)
NEUTROPHILS NFR BLD AUTO: 70.7 % (ref 42.7–76)
NRBC BLD AUTO-RTO: 0 /100 WBC (ref 0–0.2)
PATH REPORT.FINAL DX SPEC: NORMAL
PATH REPORT.GROSS SPEC: NORMAL
PLATELET # BLD AUTO: 341 10*3/MM3 (ref 140–450)
PMV BLD AUTO: 10.8 FL (ref 6–12)
POTASSIUM BLD-SCNC: 4.8 MMOL/L (ref 3.5–5.2)
PROT SERPL-MCNC: 6.1 G/DL (ref 6–8.5)
RBC # BLD AUTO: 2.18 10*6/MM3 (ref 4.14–5.8)
RETICS/RBC NFR AUTO: 2.17 % (ref 0.7–1.9)
RH BLD: POSITIVE
SODIUM BLD-SCNC: 132 MMOL/L (ref 136–145)
T&S EXPIRATION DATE: NORMAL
TIBC SERPL-MCNC: 268 MCG/DL (ref 298–536)
TRANSFERRIN SERPL-MCNC: 180 MG/DL (ref 200–360)
WBC NRBC COR # BLD: 6.55 10*3/MM3 (ref 3.4–10.8)

## 2020-01-17 PROCEDURE — 86901 BLOOD TYPING SEROLOGIC RH(D): CPT

## 2020-01-17 PROCEDURE — 86920 COMPATIBILITY TEST SPIN: CPT

## 2020-01-17 PROCEDURE — 36415 COLL VENOUS BLD VENIPUNCTURE: CPT

## 2020-01-17 PROCEDURE — 83540 ASSAY OF IRON: CPT | Performed by: INTERNAL MEDICINE

## 2020-01-17 PROCEDURE — 86850 RBC ANTIBODY SCREEN: CPT

## 2020-01-17 PROCEDURE — 84466 ASSAY OF TRANSFERRIN: CPT | Performed by: INTERNAL MEDICINE

## 2020-01-17 PROCEDURE — 96372 THER/PROPH/DIAG INJ SC/IM: CPT

## 2020-01-17 PROCEDURE — 82728 ASSAY OF FERRITIN: CPT | Performed by: INTERNAL MEDICINE

## 2020-01-17 PROCEDURE — 85025 COMPLETE CBC W/AUTO DIFF WBC: CPT | Performed by: INTERNAL MEDICINE

## 2020-01-17 PROCEDURE — 99215 OFFICE O/P EST HI 40 MIN: CPT | Performed by: INTERNAL MEDICINE

## 2020-01-17 PROCEDURE — 25010000002 EPOETIN ALFA PER 1000 UNITS: Performed by: INTERNAL MEDICINE

## 2020-01-17 PROCEDURE — 86900 BLOOD TYPING SEROLOGIC ABO: CPT

## 2020-01-17 PROCEDURE — 82668 ASSAY OF ERYTHROPOIETIN: CPT | Performed by: INTERNAL MEDICINE

## 2020-01-17 PROCEDURE — 80053 COMPREHEN METABOLIC PANEL: CPT | Performed by: INTERNAL MEDICINE

## 2020-01-17 PROCEDURE — 85046 RETICYTE/HGB CONCENTRATE: CPT | Performed by: INTERNAL MEDICINE

## 2020-01-17 RX ORDER — ACETAMINOPHEN 325 MG/1
650 TABLET ORAL ONCE
Status: CANCELLED | OUTPATIENT
Start: 2020-01-17 | End: 2020-01-17

## 2020-01-17 RX ORDER — SODIUM CHLORIDE 9 MG/ML
250 INJECTION, SOLUTION INTRAVENOUS AS NEEDED
Status: CANCELLED | OUTPATIENT
Start: 2020-01-17

## 2020-01-17 RX ADMIN — ERYTHROPOIETIN 20000 UNITS: 20000 INJECTION, SOLUTION INTRAVENOUS; SUBCUTANEOUS at 12:31

## 2020-01-17 NOTE — PROGRESS NOTES
Orders entered for 2 units PRBCs with referral to [facility] for transfusion, Tylenol 650 mg PO prior to transfusion V.O. Dr. Hernandez

## 2020-01-17 NOTE — PROGRESS NOTES
[facility] GROUP OUTPATIENT FOLLOW UP CLINIC VISIT    REASON FOR FOLLOW-UP:    1.  Normocytic anemia secondary to chronic kidney disease stage IV  2.  Faint monoclonal lambda light chain discovered on serum immunofixation.  Labs repeated here did not demonstrate abnormality.  3.  Procrit initiated on 12/19/2019 at 20,000 units with an excellent response in his hemoglobin  4.  Right partial nephrectomy on 1/3/2020 with findings including an epithelial cyst with a clear cell lining, background glomerulosclerosis inflammation and amorphous material consistent with chronic kidney disease.  Pathology forwarded to the [facility].  Result pending as of 1/17/2020.      HISTORY OF PRESENT ILLNESS:  Donald Johnson is a 69 y.o. male with the above-mentioned history, who returns the office today for scheduled follow-up.    In preparation for a right partial nephrectomy we did initiate Procrit on 12/19/2019 at 20,000 units.  His hemoglobin improved nicely from 9.4-10.1 and the Procrit was held on 12/30/2019.  He had a right partial nephrectomy on 1/3/2020.  His hemoglobin dropped to 7.0 on 1/4/2020.    He developed hematuria initially which did resolve a few days after his surgery.  However, over the past 6 days he has developed gradually worsening hematuria.  He saw Dr. Chavez on Tuesday who advised observation.  However, the hematuria has worsened.  He was having some pain in the right flank but this has improved over the past week.  He is quite fatigued.  He has had some lightheadedness but no syncope.  He has tried to remain active rather than resting which might be part of the problem.    HEMATOLOGIC HISTORY:  He recently saw Dr. Conroy with nephrology.  Long history of type 2 diabetes and hypertension noted.  Labs showed a faint monoclonal lambda light chain on serum immunofixation.  Urine immunofixation was negative, only showing polyclonal light chains.  Serum protein electrophoresis was negative  for a monoclonal protein.  His total protein level on the CMP was 6.0 with an albumin of 3.5.  His calcium was normal at 8.9.  His creatinine was elevated at 2.96.  He was anemic with hemoglobin of 9.7 with hematocrit 26%.  MCV was normal at 92.7.  White blood cells were normal at 7.3 with platelets 208,000.     Previously on 1/18/2016 his creatinine was 2.1 with a hemoglobin of 12.7.      Initial labs here in the office showed a normal serum protein electrophoresis with immunofixation on 2/26/2018.  Serum free light chain K/L ratio was normal although both light chains were elevated secondary to his kidney disease.  His erythropoietin level was normal at 7.7.  Creatinine was 2.64.  Iron studies and vitamin B12 are adequate.    ALLERGIES:  Allergies   Allergen Reactions   • Amlodipine Swelling     Worse LE edema.       MEDICATIONS:  The medication list has been reviewed with the patient by the medical assistant, and the list has been updated in the electronic medical record, which I reviewed.  Medication dosages and frequencies were confirmed to be accurate.    I have reviewed the patient's medical history in detail and updated the computerized patient record.    Review of Systems   Constitutional: Positive for fatigue. Negative for appetite change, chills and fever.   HENT:   Negative for trouble swallowing.    Respiratory: Negative for cough and shortness of breath.    Cardiovascular: Negative for chest pain and leg swelling.   Gastrointestinal: Negative for abdominal distention, blood in stool, constipation, diarrhea and nausea.   Musculoskeletal: Positive for back pain (chronic). Negative for arthralgias and myalgias.   Skin: Negative for rash.   Neurological: Positive for light-headedness. Negative for dizziness and extremity weakness.   Hematological: Does not bruise/bleed easily.   Review of systems unchanged from previous      Vitals:    01/17/20 1012   BP: 127/55   Pulse: 72   Resp: 16   Temp: 98.1 °F (36.7  "°C)   TempSrc: Oral   SpO2: 99%   Weight: 105 kg (230 lb 6.4 oz)   Height: 182.4 cm (71.81\")   PainSc: 0-No pain  Comment: anemia       PHYSICAL EXAMINATION:  GENERAL:  Well-developed well-nourished male; awake, alert and oriented, in no acute distress.  SKIN: No rash  HEAD:  Normocephalic, atraumatic.  EARS:  Hearing intact.  NOSE:  Septum midline.  No excoriations or nasal discharge.  MOUTH:  No stomatitis or ulcers.  Lips are normal.  THROAT:  Oropharynx without lesions or exudates.  CHEST:  Lungs are clear to auscultation bilaterally.  No wheezes, rales, or rhonchi.  HEART:  Regular rate; normal rhythm.  No murmurs, gallops or rubs.  Abdomen: Healing incisions on the right flank.  Minimal tenderness to palpation in this area.  Soft and nontender otherwise.  EXTREMITIES:  No clubbing cyanosis or edema  Exam unchanged from previous    DIAGNOSTIC DATA:    Results for orders placed or performed in visit on 01/17/20   Retic With IRF & RET-He   Result Value Ref Range    Immature Reticulocyte Fraction 12.7 3.0 - 15.8 %    Reticulocyte % 2.17 (H) 0.70 - 1.90 %    Reticulocyte Hgb 32.6 29.8 - 36.1 pg   CBC Auto Differential   Result Value Ref Range    WBC 6.55 3.40 - 10.80 10*3/mm3    RBC 2.18 (L) 4.14 - 5.80 10*6/mm3    Hemoglobin 6.8 (C) 13.0 - 17.7 g/dL    Hematocrit 20.1 (C) 37.5 - 51.0 %    MCV 92.2 79.0 - 97.0 fL    MCH 31.2 26.6 - 33.0 pg    MCHC 33.8 31.5 - 35.7 g/dL    RDW 13.3 12.3 - 15.4 %    RDW-SD 44.6 37.0 - 54.0 fl    MPV 10.8 6.0 - 12.0 fL    Platelets 341 140 - 450 10*3/mm3    Neutrophil % 70.7 42.7 - 76.0 %    Lymphocyte % 9.9 (L) 19.6 - 45.3 %    Monocyte % 10.2 5.0 - 12.0 %    Eosinophil % 8.4 (H) 0.3 - 6.2 %    Basophil % 0.5 0.0 - 1.5 %    Immature Grans % 0.3 0.0 - 0.5 %    Neutrophils, Absolute 4.63 1.70 - 7.00 10*3/mm3    Lymphocytes, Absolute 0.65 (L) 0.70 - 3.10 10*3/mm3    Monocytes, Absolute 0.67 0.10 - 0.90 10*3/mm3    Eosinophils, Absolute 0.55 (H) 0.00 - 0.40 10*3/mm3    Basophils, " Absolute 0.03 0.00 - 0.20 10*3/mm3    Immature Grans, Absolute 0.02 0.00 - 0.05 10*3/mm3    nRBC 0.0 0.0 - 0.2 /100 WBC     IMAGING: CT abdomen and pelvis 10/22/2019 at [place]    IMPRESSION:  1. The largest indeterminate right lower pole renal mass has increased in size significantly. The differential diagnosis is enlarging solid neoplasm such as renal cell carcinoma versus enlarging hyperdense cyst. Renal ultrasound should be able to   differentiate.  2. The other bilateral indeterminate renal lesions are stable. Some are highly likely to be hyperdense cysts and at least one on the left is probably a simple cyst. The others remain indeterminate.  3. Cardiomegaly with coronary artery atherosclerosis.    ASSESSMENT:  This is a 69 y.o. male with:  1.  Chronic kidney disease stage 4.  He follows with Dr. Conroy with nephrology.  Hopeful for a kidney transplant at some point in the future.    2.  Normocytic anemia related chronic kidney disease: With hemoglobin of 9.4 on 12/19/2019 he initiated Procrit 20,000 units.  Hemoglobin improved to 10.1 as of 12/30/2019.  Hemoglobin dropped after his right partial nephrectomy to 7.0 on 1/4/2020.    Today, his hemoglobin is still quite low at 6.8.    We discussed addressing this as an outpatient or potentially admission to the hospital.  He prefers to address this as an outpatient.    Procrit 20,000 units today and we will arrange 2 units of packed red blood cells.  Discussed with Dr. Conroy.  Discussed with the blood bank.  No special preparation is necessary for the red cells.    3.  Faint monoclonal lambda light chain on serum immunofixation: Clinically insignificant and this was not visible on repeat labs here on 2/26/2018.  Repeat labs 12/6/2018 without significant abnormality.     4.  Right kidney lesion status post right partial nephrectomy on 1/3/2020.  Final pathology on review of the [facility] is pending.    5.  Hematuria: Obviously contributing to his  anemia.  I communicated with Dr. Ean Chavez today.  This is anticipated.  He recommends bed rest.  The patient has tried to remain active and does desire to go to his grandsons basketball games this weekend.  I advised against this as I believe activity is contributing to the hematuria.      PLAN:  1. Procrit 20,000 units today  2. 2 units of packed red blood cells tomorrow  3. Weekly CBC with Procrit as appropriate  4. Nurse practitioner visit in 1 week  5. I will see him back in about a month  6. Advised if he at all feels worse he needs to present to the emergency department at [facility].  7. Needs bed rest until hematuria improves    Discussed with Dr. Ean Chavez.  Discussed with Dr. Gracie Conroy.  Discussed with the blood bank.      Pollo Hernandez MD

## 2020-01-18 ENCOUNTER — INFUSION (OUTPATIENT)
Dept: ONCOLOGY | Facility: HOSPITAL | Age: 70
End: 2020-01-18

## 2020-01-18 VITALS
TEMPERATURE: 97.4 F | SYSTOLIC BLOOD PRESSURE: 142 MMHG | HEART RATE: 76 BPM | RESPIRATION RATE: 18 BRPM | DIASTOLIC BLOOD PRESSURE: 59 MMHG | OXYGEN SATURATION: 98 %

## 2020-01-18 DIAGNOSIS — D63.1 ANEMIA OF CHRONIC KIDNEY FAILURE, STAGE 4 (SEVERE) (HCC): ICD-10-CM

## 2020-01-18 DIAGNOSIS — N18.4 ANEMIA OF CHRONIC KIDNEY FAILURE, STAGE 4 (SEVERE) (HCC): ICD-10-CM

## 2020-01-18 PROCEDURE — 86900 BLOOD TYPING SEROLOGIC ABO: CPT

## 2020-01-18 PROCEDURE — P9016 RBC LEUKOCYTES REDUCED: HCPCS

## 2020-01-18 PROCEDURE — 36430 TRANSFUSION BLD/BLD COMPNT: CPT

## 2020-01-18 RX ORDER — SODIUM CHLORIDE 9 MG/ML
250 INJECTION, SOLUTION INTRAVENOUS AS NEEDED
Status: DISCONTINUED | OUTPATIENT
Start: 2020-01-18 | End: 2020-01-18 | Stop reason: HOSPADM

## 2020-01-18 RX ORDER — ACETAMINOPHEN 325 MG/1
650 TABLET ORAL ONCE
Status: COMPLETED | OUTPATIENT
Start: 2020-01-18 | End: 2020-01-18

## 2020-01-18 RX ADMIN — ACETAMINOPHEN 650 MG: 325 TABLET, FILM COATED ORAL at 09:15

## 2020-01-19 LAB
ABO + RH BLD: NORMAL
ABO + RH BLD: NORMAL
BH BB BLOOD EXPIRATION DATE: NORMAL
BH BB BLOOD EXPIRATION DATE: NORMAL
BH BB BLOOD TYPE BARCODE: 5100
BH BB BLOOD TYPE BARCODE: 5100
BH BB DISPENSE STATUS: NORMAL
BH BB DISPENSE STATUS: NORMAL
BH BB PRODUCT CODE: NORMAL
BH BB PRODUCT CODE: NORMAL
BH BB UNIT NUMBER: NORMAL
BH BB UNIT NUMBER: NORMAL
CROSSMATCH INTERPRETATION: NORMAL
CROSSMATCH INTERPRETATION: NORMAL
UNIT  ABO: NORMAL
UNIT  ABO: NORMAL
UNIT  RH: NORMAL
UNIT  RH: NORMAL

## 2020-01-20 LAB — ETHNIC BACKGROUND STATED: 13.6 MIU/ML (ref 2.6–18.5)

## 2020-01-21 DIAGNOSIS — D63.1 ANEMIA OF CHRONIC KIDNEY FAILURE, STAGE 4 (SEVERE) (HCC): ICD-10-CM

## 2020-01-21 DIAGNOSIS — N18.4 ANEMIA OF CHRONIC KIDNEY FAILURE, STAGE 4 (SEVERE) (HCC): ICD-10-CM

## 2020-01-24 ENCOUNTER — INFUSION (OUTPATIENT)
Dept: ONCOLOGY | Facility: HOSPITAL | Age: 70
End: 2020-01-24

## 2020-01-24 ENCOUNTER — LAB (OUTPATIENT)
Dept: OTHER | Facility: HOSPITAL | Age: 70
End: 2020-01-24

## 2020-01-24 ENCOUNTER — OFFICE VISIT (OUTPATIENT)
Dept: ONCOLOGY | Facility: CLINIC | Age: 70
End: 2020-01-24

## 2020-01-24 VITALS
TEMPERATURE: 98 F | HEART RATE: 52 BPM | HEIGHT: 72 IN | RESPIRATION RATE: 16 BRPM | WEIGHT: 230.8 LBS | SYSTOLIC BLOOD PRESSURE: 149 MMHG | DIASTOLIC BLOOD PRESSURE: 68 MMHG | OXYGEN SATURATION: 98 % | BODY MASS INDEX: 31.26 KG/M2

## 2020-01-24 DIAGNOSIS — N18.30 CHRONIC KIDNEY DISEASE, STAGE III (MODERATE) (HCC): Primary | ICD-10-CM

## 2020-01-24 DIAGNOSIS — R31.9 HEMATURIA, UNSPECIFIED TYPE: ICD-10-CM

## 2020-01-24 DIAGNOSIS — N18.4 ANEMIA OF CHRONIC KIDNEY FAILURE, STAGE 4 (SEVERE) (HCC): Primary | ICD-10-CM

## 2020-01-24 DIAGNOSIS — D63.1 ANEMIA OF CHRONIC KIDNEY FAILURE, STAGE 4 (SEVERE) (HCC): Primary | ICD-10-CM

## 2020-01-24 LAB
ALBUMIN SERPL-MCNC: 3.1 G/DL (ref 3.5–5.2)
ALBUMIN/GLOB SERPL: 1 G/DL
ALP SERPL-CCNC: 185 U/L (ref 39–117)
ALT SERPL W P-5'-P-CCNC: 22 U/L (ref 1–41)
ANION GAP SERPL CALCULATED.3IONS-SCNC: 14 MMOL/L (ref 5–15)
AST SERPL-CCNC: 28 U/L (ref 1–40)
BASOPHILS # BLD AUTO: 0.03 10*3/MM3 (ref 0–0.2)
BASOPHILS NFR BLD AUTO: 0.7 % (ref 0–1.5)
BILIRUB SERPL-MCNC: 0.4 MG/DL (ref 0.1–1.2)
BUN BLD-MCNC: 82 MG/DL (ref 8–23)
BUN/CREAT SERPL: 21.2 (ref 7–25)
CALCIUM SPEC-SCNC: 8.9 MG/DL (ref 8.6–10.5)
CHLORIDE SERPL-SCNC: 103 MMOL/L (ref 98–107)
CO2 SERPL-SCNC: 21 MMOL/L (ref 22–29)
CREAT BLD-MCNC: 3.87 MG/DL (ref 0.76–1.27)
DEPRECATED RDW RBC AUTO: 49.8 FL (ref 37–54)
EOSINOPHIL # BLD AUTO: 0.44 10*3/MM3 (ref 0–0.4)
EOSINOPHIL NFR BLD AUTO: 9.9 % (ref 0.3–6.2)
ERYTHROCYTE [DISTWIDTH] IN BLOOD BY AUTOMATED COUNT: 14.4 % (ref 12.3–15.4)
GFR SERPL CREATININE-BSD FRML MDRD: 16 ML/MIN/1.73
GLOBULIN UR ELPH-MCNC: 3 GM/DL
GLUCOSE BLD-MCNC: 92 MG/DL (ref 65–99)
HCT VFR BLD AUTO: 26.5 % (ref 37.5–51)
HGB BLD-MCNC: 8.6 G/DL (ref 13–17.7)
HGB RETIC QN AUTO: 33.7 PG (ref 29.8–36.1)
IMM GRANULOCYTES # BLD AUTO: 0.01 10*3/MM3 (ref 0–0.05)
IMM GRANULOCYTES NFR BLD AUTO: 0.2 % (ref 0–0.5)
IMM RETICS NFR: 18.6 % (ref 3–15.8)
LYMPHOCYTES # BLD AUTO: 0.61 10*3/MM3 (ref 0.7–3.1)
LYMPHOCYTES NFR BLD AUTO: 13.7 % (ref 19.6–45.3)
MCH RBC QN AUTO: 31 PG (ref 26.6–33)
MCHC RBC AUTO-ENTMCNC: 32.5 G/DL (ref 31.5–35.7)
MCV RBC AUTO: 95.7 FL (ref 79–97)
MONOCYTES # BLD AUTO: 0.42 10*3/MM3 (ref 0.1–0.9)
MONOCYTES NFR BLD AUTO: 9.4 % (ref 5–12)
NEUTROPHILS # BLD AUTO: 2.95 10*3/MM3 (ref 1.7–7)
NEUTROPHILS NFR BLD AUTO: 66.1 % (ref 42.7–76)
NRBC BLD AUTO-RTO: 0 /100 WBC (ref 0–0.2)
PLATELET # BLD AUTO: 320 10*3/MM3 (ref 140–450)
PMV BLD AUTO: 9.9 FL (ref 6–12)
POTASSIUM BLD-SCNC: 4.7 MMOL/L (ref 3.5–5.2)
PROT SERPL-MCNC: 6.1 G/DL (ref 6–8.5)
RBC # BLD AUTO: 2.77 10*6/MM3 (ref 4.14–5.8)
RETICS/RBC NFR AUTO: 3.58 % (ref 0.7–1.9)
SODIUM BLD-SCNC: 138 MMOL/L (ref 136–145)
WBC NRBC COR # BLD: 4.46 10*3/MM3 (ref 3.4–10.8)

## 2020-01-24 PROCEDURE — 85046 RETICYTE/HGB CONCENTRATE: CPT | Performed by: INTERNAL MEDICINE

## 2020-01-24 PROCEDURE — 36415 COLL VENOUS BLD VENIPUNCTURE: CPT

## 2020-01-24 PROCEDURE — 80053 COMPREHEN METABOLIC PANEL: CPT | Performed by: INTERNAL MEDICINE

## 2020-01-24 PROCEDURE — 99213 OFFICE O/P EST LOW 20 MIN: CPT | Performed by: NURSE PRACTITIONER

## 2020-01-24 PROCEDURE — 25010000002 EPOETIN ALFA PER 1000 UNITS: Performed by: NURSE PRACTITIONER

## 2020-01-24 PROCEDURE — 96372 THER/PROPH/DIAG INJ SC/IM: CPT

## 2020-01-24 PROCEDURE — 85025 COMPLETE CBC W/AUTO DIFF WBC: CPT | Performed by: INTERNAL MEDICINE

## 2020-01-24 RX ADMIN — ERYTHROPOIETIN 25000 UNITS: 20000 INJECTION, SOLUTION INTRAVENOUS; SUBCUTANEOUS at 13:46

## 2020-01-24 NOTE — PROGRESS NOTES
Hgb 8.6.  Per STEVEN Herndon ok to increase procrit to 25,000 today.  Copy of labs given and pt v/u.  Pt to return next week for CBC with possible procrit.    Lab Results   Component Value Date    WBC 4.46 01/24/2020    HGB 8.6 (L) 01/24/2020    HCT 26.5 (L) 01/24/2020    MCV 95.7 01/24/2020     01/24/2020

## 2020-01-24 NOTE — PROGRESS NOTES
[facility] GROUP OUTPATIENT FOLLOW UP CLINIC VISIT    REASON FOR FOLLOW-UP:    1.  Normocytic anemia secondary to chronic kidney disease stage IV  2.  Faint monoclonal lambda light chain discovered on serum immunofixation.  Labs repeated here did not demonstrate abnormality.  3.  Procrit initiated on 12/19/2019 at 20,000 units with an excellent response in his hemoglobin  4.  Right partial nephrectomy on 1/3/2020 with findings including an epithelial cyst with a clear cell lining, background glomerulosclerosis inflammation and amorphous material consistent with chronic kidney disease.  Pathology forwarded to the [facility].  Result pending as of 1/17/2020.      HISTORY OF PRESENT ILLNESS:  Donald Johnson is a 69 y.o. male with the above-mentioned history, who returns the office today for scheduled follow-up.    In preparation for a right partial nephrectomy we did initiate Procrit on 12/19/2019 at 20,000 units.  His hemoglobin improved nicely from 9.4-10.1 and the Procrit was held on 12/30/2019.  He had a right partial nephrectomy on 1/3/2020.  His hemoglobin dropped to 7.0 on 1/4/2020.    He developed hematuria initially which resolved a few days postoperatively.  Unfortunately, over the last few weeks the hematuria has recurred and was progressively worsening.  He has seen his surgeon, Dr. Chavez and has also followed up with Dr. Conroy of nephrology.  Dr. Hernandez spoke with Dr. Chavez last week and the continued hematuria was felt to be an expected side effect following nephrectomy.  Patient's hemoglobin  was 6.8 at that time.  He received a Procrit shot that day and subsequently 2 units of packed red blood cells.  Bed rest was recommended.    As the patient returns today, he does feel that the hematuria has improved slightly.  He will follow-up with Dr. Conroy again on February 25 and will see Dr. Chavez within the next week for reevaluation.  He states that he has been resting over the last  week.  His wife admits that he is still going to work regularly and is not resting as adequately as recommended.    His hemoglobin today is greatly improved at 8.6.  He denies any significant chest pain, shortness of breath, or any other sources of bleeding.  He reports his energy is greatly improved.    The remainder of his CBC is unremarkable.  He has no other concerns today.    .    HEMATOLOGIC HISTORY:  He recently saw Dr. Conroy with nephrology.  Long history of type 2 diabetes and hypertension noted.  Labs showed a faint monoclonal lambda light chain on serum immunofixation.  Urine immunofixation was negative, only showing polyclonal light chains.  Serum protein electrophoresis was negative for a monoclonal protein.  His total protein level on the CMP was 6.0 with an albumin of 3.5.  His calcium was normal at 8.9.  His creatinine was elevated at 2.96.  He was anemic with hemoglobin of 9.7 with hematocrit 26%.  MCV was normal at 92.7.  White blood cells were normal at 7.3 with platelets 208,000.     Previously on 1/18/2016 his creatinine was 2.1 with a hemoglobin of 12.7.      Initial labs here in the office showed a normal serum protein electrophoresis with immunofixation on 2/26/2018.  Serum free light chain K/L ratio was normal although both light chains were elevated secondary to his kidney disease.  His erythropoietin level was normal at 7.7.  Creatinine was 2.64.  Iron studies and vitamin B12 are adequate.    ALLERGIES:  Allergies   Allergen Reactions   • Amlodipine Swelling     Worse LE edema.       MEDICATIONS:  The medication list has been reviewed with the patient by the medical assistant, and the list has been updated in the electronic medical record, which I reviewed.  Medication dosages and frequencies were confirmed to be accurate.    I have reviewed the patient's medical history in detail and updated the computerized patient record.    Review of Systems   Constitutional: Positive for fatigue.  "Negative for appetite change, chills and fever.   HENT:   Negative for trouble swallowing.    Respiratory: Negative for cough and shortness of breath.    Cardiovascular: Negative for chest pain and leg swelling.   Gastrointestinal: Negative for abdominal distention, blood in stool, constipation, diarrhea and nausea.   Musculoskeletal: Positive for back pain (chronic). Negative for arthralgias and myalgias.   Skin: Negative for rash.   Neurological: Negative for dizziness, extremity weakness and light-headedness.   Hematological: Does not bruise/bleed easily.   Review of systems unchanged from previous      Vitals:    01/24/20 1239   BP: 149/68   Pulse: 52   Resp: 16   Temp: 98 °F (36.7 °C)   TempSrc: Oral   SpO2: 98%   Weight: 105 kg (230 lb 12.8 oz)   Height: 182.4 cm (71.81\")   PainSc: 0-No pain       PHYSICAL EXAMINATION:  GENERAL:  Well-developed well-nourished male; awake, alert and oriented, in no acute distress.  SKIN: No rash  HEAD:  Normocephalic, atraumatic.  EARS:  Hearing intact.  NOSE:  Septum midline.  No excoriations or nasal discharge.  MOUTH:  No stomatitis or ulcers.  Lips are normal.  THROAT:  Oropharynx without lesions or exudates.  CHEST:  Lungs are clear to auscultation bilaterally.  No wheezes, rales, or rhonchi.  HEART:  Regular rate; normal rhythm.  No murmurs, gallops or rubs.  Abdomen: Healing incisions on the right flank.  Minimal tenderness to palpation in this area.  Soft and nontender otherwise.  EXTREMITIES:  No clubbing cyanosis or edema  Exam unchanged from previous    DIAGNOSTIC DATA:    Results for orders placed or performed in visit on 01/24/20   Comprehensive Metabolic Panel   Result Value Ref Range    Glucose 92 65 - 99 mg/dL    BUN 82 (H) 8 - 23 mg/dL    Creatinine 3.87 (H) 0.76 - 1.27 mg/dL    Sodium 138 136 - 145 mmol/L    Potassium 4.7 3.5 - 5.2 mmol/L    Chloride 103 98 - 107 mmol/L    CO2 21.0 (L) 22.0 - 29.0 mmol/L    Calcium 8.9 8.6 - 10.5 mg/dL    Total Protein 6.1 6.0 " - 8.5 g/dL    Albumin 3.10 (L) 3.50 - 5.20 g/dL    ALT (SGPT) 22 1 - 41 U/L    AST (SGOT) 28 1 - 40 U/L    Alkaline Phosphatase 185 (H) 39 - 117 U/L    Total Bilirubin 0.4 0.1 - 1.2 mg/dL    eGFR Non African Amer 16 (L) >60 mL/min/1.73    Globulin 3.0 gm/dL    A/G Ratio 1.0 g/dL    BUN/Creatinine Ratio 21.2 7.0 - 25.0    Anion Gap 14.0 5.0 - 15.0 mmol/L   Retic With IRF & RET-He   Result Value Ref Range    Immature Reticulocyte Fraction 18.6 (H) 3.0 - 15.8 %    Reticulocyte % 3.58 (H) 0.70 - 1.90 %    Reticulocyte Hgb 33.7 29.8 - 36.1 pg   CBC Auto Differential   Result Value Ref Range    WBC 4.46 3.40 - 10.80 10*3/mm3    RBC 2.77 (L) 4.14 - 5.80 10*6/mm3    Hemoglobin 8.6 (L) 13.0 - 17.7 g/dL    Hematocrit 26.5 (L) 37.5 - 51.0 %    MCV 95.7 79.0 - 97.0 fL    MCH 31.0 26.6 - 33.0 pg    MCHC 32.5 31.5 - 35.7 g/dL    RDW 14.4 12.3 - 15.4 %    RDW-SD 49.8 37.0 - 54.0 fl    MPV 9.9 6.0 - 12.0 fL    Platelets 320 140 - 450 10*3/mm3    Neutrophil % 66.1 42.7 - 76.0 %    Lymphocyte % 13.7 (L) 19.6 - 45.3 %    Monocyte % 9.4 5.0 - 12.0 %    Eosinophil % 9.9 (H) 0.3 - 6.2 %    Basophil % 0.7 0.0 - 1.5 %    Immature Grans % 0.2 0.0 - 0.5 %    Neutrophils, Absolute 2.95 1.70 - 7.00 10*3/mm3    Lymphocytes, Absolute 0.61 (L) 0.70 - 3.10 10*3/mm3    Monocytes, Absolute 0.42 0.10 - 0.90 10*3/mm3    Eosinophils, Absolute 0.44 (H) 0.00 - 0.40 10*3/mm3    Basophils, Absolute 0.03 0.00 - 0.20 10*3/mm3    Immature Grans, Absolute 0.01 0.00 - 0.05 10*3/mm3    nRBC 0.0 0.0 - 0.2 /100 WBC     IMAGING: CT abdomen and pelvis 10/22/2019 at [place]    IMPRESSION:  1. The largest indeterminate right lower pole renal mass has increased in size significantly. The differential diagnosis is enlarging solid neoplasm such as renal cell carcinoma versus enlarging hyperdense cyst. Renal ultrasound should be able to   differentiate.  2. The other bilateral indeterminate renal lesions are stable. Some are highly likely to be hyperdense cysts and at  least one on the left is probably a simple cyst. The others remain indeterminate.  3. Cardiomegaly with coronary artery atherosclerosis.    ASSESSMENT:  This is a 69 y.o. male with:  1.  Chronic kidney disease stage 4.  He follows with Dr. Conroy with nephrology.  Hopeful for a kidney transplant at some point in the future.  The patient will follow-up with Dr. Conroy on February 25, 2020    2.  Normocytic anemia related chronic kidney disease: With hemoglobin of 9.4 on 12/19/2019 he initiated Procrit 20,000 units.  Hemoglobin improved to 10.1 as of 12/30/2019.  Hemoglobin dropped after his right partial nephrectomy to 7.0 on 1/4/2020.    Today, his hemoglobin hemoglobin is greatly improved to 8.6 following 20,000 units of Procrit and 2 units of packed red blood cells last week.  He will require Procrit today.  We will continue to monitor this closely.  There will be gradual improvement as the patient's hematuria resolves.    3.  Faint monoclonal lambda light chain on serum immunofixation: Clinically insignificant and this was not visible on repeat labs here on 2/26/2018.  Repeat labs 12/6/2018 without significant abnormality.     4.  Right kidney lesion status post right partial nephrectomy on 1/3/2020.  Final pathology on review of the [facility] is pending.    5.  Hematuria: Obviously contributing to his anemia.  Persistent hematuria was discussed with the patient's surgeon, Dr. Chavez and this is an expected outcome at this time.  The patient admits that the hematuria has lessened over the last week.  His hemoglobin has improved.  We will continue to monitor this closely.  As noted above, the patient will follow-up with Dr. Chavez within the next week.      PLAN:  1. Procrit today  2. Weekly CBC with Procrit as appropriate  3. MD visit with Dr. Hernandez on February 21, 2020  4. I recommended continued bed rest until hematuria has resolved  5. The patient has scheduled follow-up with Dr. Chavez and   Marycarmen  6. I have asked the patient to call our office or go to the emergency room with any progression of symptoms.  He and his wife have verbalized understanding.        STEVEN Gutierrez

## 2020-01-27 DIAGNOSIS — D63.1 ANEMIA OF CHRONIC KIDNEY FAILURE, STAGE 4 (SEVERE) (HCC): ICD-10-CM

## 2020-01-27 DIAGNOSIS — N18.4 ANEMIA OF CHRONIC KIDNEY FAILURE, STAGE 4 (SEVERE) (HCC): ICD-10-CM

## 2020-01-31 ENCOUNTER — LAB (OUTPATIENT)
Dept: OTHER | Facility: HOSPITAL | Age: 70
End: 2020-01-31

## 2020-01-31 ENCOUNTER — INFUSION (OUTPATIENT)
Dept: ONCOLOGY | Facility: HOSPITAL | Age: 70
End: 2020-01-31

## 2020-01-31 VITALS
RESPIRATION RATE: 16 BRPM | DIASTOLIC BLOOD PRESSURE: 68 MMHG | TEMPERATURE: 97.9 F | SYSTOLIC BLOOD PRESSURE: 145 MMHG | BODY MASS INDEX: 31.74 KG/M2 | WEIGHT: 232.8 LBS | HEART RATE: 53 BPM | OXYGEN SATURATION: 96 %

## 2020-01-31 DIAGNOSIS — D63.1 ANEMIA OF CHRONIC KIDNEY FAILURE, STAGE 4 (SEVERE) (HCC): ICD-10-CM

## 2020-01-31 DIAGNOSIS — D63.1 ANEMIA OF CHRONIC KIDNEY FAILURE, STAGE 4 (SEVERE) (HCC): Primary | ICD-10-CM

## 2020-01-31 DIAGNOSIS — N18.4 ANEMIA OF CHRONIC KIDNEY FAILURE, STAGE 4 (SEVERE) (HCC): ICD-10-CM

## 2020-01-31 DIAGNOSIS — N18.4 ANEMIA OF CHRONIC KIDNEY FAILURE, STAGE 4 (SEVERE) (HCC): Primary | ICD-10-CM

## 2020-01-31 LAB
BASOPHILS # BLD AUTO: 0.03 10*3/MM3 (ref 0–0.2)
BASOPHILS NFR BLD AUTO: 0.6 % (ref 0–1.5)
DEPRECATED RDW RBC AUTO: 50.3 FL (ref 37–54)
EOSINOPHIL # BLD AUTO: 0.63 10*3/MM3 (ref 0–0.4)
EOSINOPHIL NFR BLD AUTO: 12.8 % (ref 0.3–6.2)
ERYTHROCYTE [DISTWIDTH] IN BLOOD BY AUTOMATED COUNT: 14.8 % (ref 12.3–15.4)
HCT VFR BLD AUTO: 28.6 % (ref 37.5–51)
HGB BLD-MCNC: 9.4 G/DL (ref 13–17.7)
IMM GRANULOCYTES # BLD AUTO: 0.02 10*3/MM3 (ref 0–0.05)
IMM GRANULOCYTES NFR BLD AUTO: 0.4 % (ref 0–0.5)
LYMPHOCYTES # BLD AUTO: 0.76 10*3/MM3 (ref 0.7–3.1)
LYMPHOCYTES NFR BLD AUTO: 15.5 % (ref 19.6–45.3)
MCH RBC QN AUTO: 30.5 PG (ref 26.6–33)
MCHC RBC AUTO-ENTMCNC: 32.9 G/DL (ref 31.5–35.7)
MCV RBC AUTO: 92.9 FL (ref 79–97)
MONOCYTES # BLD AUTO: 0.67 10*3/MM3 (ref 0.1–0.9)
MONOCYTES NFR BLD AUTO: 13.6 % (ref 5–12)
NEUTROPHILS # BLD AUTO: 2.8 10*3/MM3 (ref 1.7–7)
NEUTROPHILS NFR BLD AUTO: 57.1 % (ref 42.7–76)
NRBC BLD AUTO-RTO: 0 /100 WBC (ref 0–0.2)
PLATELET # BLD AUTO: 271 10*3/MM3 (ref 140–450)
PMV BLD AUTO: 10.7 FL (ref 6–12)
RBC # BLD AUTO: 3.08 10*6/MM3 (ref 4.14–5.8)
WBC NRBC COR # BLD: 4.91 10*3/MM3 (ref 3.4–10.8)

## 2020-01-31 PROCEDURE — 85025 COMPLETE CBC W/AUTO DIFF WBC: CPT | Performed by: INTERNAL MEDICINE

## 2020-01-31 PROCEDURE — 96372 THER/PROPH/DIAG INJ SC/IM: CPT

## 2020-01-31 PROCEDURE — 36415 COLL VENOUS BLD VENIPUNCTURE: CPT

## 2020-01-31 PROCEDURE — 25010000002 EPOETIN ALFA PER 1000 UNITS: Performed by: NURSE PRACTITIONER

## 2020-01-31 RX ADMIN — ERYTHROPOIETIN 25000 UNITS: 20000 INJECTION, SOLUTION INTRAVENOUS; SUBCUTANEOUS at 13:32

## 2020-01-31 NOTE — NURSING NOTE
Lab Results   Component Value Date    WBC 4.91 01/31/2020    HGB 9.4 (L) 01/31/2020    HCT 28.6 (L) 01/31/2020    MCV 92.9 01/31/2020     01/31/2020     Procrit given per protocol.  Pt denies new complaints.  Next appt reviewed and pt knows to call with concerns.

## 2020-02-07 ENCOUNTER — INFUSION (OUTPATIENT)
Dept: ONCOLOGY | Facility: HOSPITAL | Age: 70
End: 2020-02-07

## 2020-02-07 ENCOUNTER — LAB (OUTPATIENT)
Dept: OTHER | Facility: HOSPITAL | Age: 70
End: 2020-02-07

## 2020-02-07 VITALS
OXYGEN SATURATION: 96 % | BODY MASS INDEX: 31.69 KG/M2 | HEART RATE: 55 BPM | HEIGHT: 72 IN | WEIGHT: 234 LBS | DIASTOLIC BLOOD PRESSURE: 61 MMHG | TEMPERATURE: 97.5 F | SYSTOLIC BLOOD PRESSURE: 159 MMHG

## 2020-02-07 DIAGNOSIS — D63.1 ANEMIA OF CHRONIC KIDNEY FAILURE, STAGE 4 (SEVERE) (HCC): ICD-10-CM

## 2020-02-07 DIAGNOSIS — N18.4 ANEMIA OF CHRONIC KIDNEY FAILURE, STAGE 4 (SEVERE) (HCC): ICD-10-CM

## 2020-02-07 LAB
BASOPHILS # BLD AUTO: 0.03 10*3/MM3 (ref 0–0.2)
BASOPHILS NFR BLD AUTO: 0.6 % (ref 0–1.5)
DEPRECATED RDW RBC AUTO: 49.1 FL (ref 37–54)
EOSINOPHIL # BLD AUTO: 0.62 10*3/MM3 (ref 0–0.4)
EOSINOPHIL NFR BLD AUTO: 12.6 % (ref 0.3–6.2)
ERYTHROCYTE [DISTWIDTH] IN BLOOD BY AUTOMATED COUNT: 14.6 % (ref 12.3–15.4)
HCT VFR BLD AUTO: 30.3 % (ref 37.5–51)
HGB BLD-MCNC: 10 G/DL (ref 13–17.7)
IMM GRANULOCYTES # BLD AUTO: 0.01 10*3/MM3 (ref 0–0.05)
IMM GRANULOCYTES NFR BLD AUTO: 0.2 % (ref 0–0.5)
LYMPHOCYTES # BLD AUTO: 0.87 10*3/MM3 (ref 0.7–3.1)
LYMPHOCYTES NFR BLD AUTO: 17.7 % (ref 19.6–45.3)
MCH RBC QN AUTO: 30.4 PG (ref 26.6–33)
MCHC RBC AUTO-ENTMCNC: 33 G/DL (ref 31.5–35.7)
MCV RBC AUTO: 92.1 FL (ref 79–97)
MONOCYTES # BLD AUTO: 0.61 10*3/MM3 (ref 0.1–0.9)
MONOCYTES NFR BLD AUTO: 12.4 % (ref 5–12)
NEUTROPHILS # BLD AUTO: 2.77 10*3/MM3 (ref 1.7–7)
NEUTROPHILS NFR BLD AUTO: 56.5 % (ref 42.7–76)
NRBC BLD AUTO-RTO: 0 /100 WBC (ref 0–0.2)
PLATELET # BLD AUTO: 246 10*3/MM3 (ref 140–450)
PMV BLD AUTO: 11 FL (ref 6–12)
RBC # BLD AUTO: 3.29 10*6/MM3 (ref 4.14–5.8)
WBC NRBC COR # BLD: 4.91 10*3/MM3 (ref 3.4–10.8)

## 2020-02-07 PROCEDURE — 85025 COMPLETE CBC W/AUTO DIFF WBC: CPT | Performed by: INTERNAL MEDICINE

## 2020-02-07 PROCEDURE — 36415 COLL VENOUS BLD VENIPUNCTURE: CPT

## 2020-02-14 ENCOUNTER — LAB (OUTPATIENT)
Dept: OTHER | Facility: HOSPITAL | Age: 70
End: 2020-02-14

## 2020-02-14 ENCOUNTER — INFUSION (OUTPATIENT)
Dept: ONCOLOGY | Facility: HOSPITAL | Age: 70
End: 2020-02-14

## 2020-02-14 VITALS
BODY MASS INDEX: 31.22 KG/M2 | DIASTOLIC BLOOD PRESSURE: 55 MMHG | TEMPERATURE: 98.1 F | SYSTOLIC BLOOD PRESSURE: 154 MMHG | WEIGHT: 229 LBS | OXYGEN SATURATION: 96 % | HEART RATE: 55 BPM

## 2020-02-14 DIAGNOSIS — N18.4 ANEMIA OF CHRONIC KIDNEY FAILURE, STAGE 4 (SEVERE) (HCC): ICD-10-CM

## 2020-02-14 DIAGNOSIS — N18.4 ANEMIA OF CHRONIC KIDNEY FAILURE, STAGE 4 (SEVERE) (HCC): Primary | ICD-10-CM

## 2020-02-14 DIAGNOSIS — D63.1 ANEMIA OF CHRONIC KIDNEY FAILURE, STAGE 4 (SEVERE) (HCC): ICD-10-CM

## 2020-02-14 DIAGNOSIS — D63.1 ANEMIA OF CHRONIC KIDNEY FAILURE, STAGE 4 (SEVERE) (HCC): Primary | ICD-10-CM

## 2020-02-14 LAB
BASOPHILS # BLD AUTO: 0.03 10*3/MM3 (ref 0–0.2)
BASOPHILS NFR BLD AUTO: 0.6 % (ref 0–1.5)
DEPRECATED RDW RBC AUTO: 47.9 FL (ref 37–54)
EOSINOPHIL # BLD AUTO: 0.65 10*3/MM3 (ref 0–0.4)
EOSINOPHIL NFR BLD AUTO: 12.3 % (ref 0.3–6.2)
ERYTHROCYTE [DISTWIDTH] IN BLOOD BY AUTOMATED COUNT: 14.6 % (ref 12.3–15.4)
HCT VFR BLD AUTO: 31.4 % (ref 37.5–51)
HGB BLD-MCNC: 10.3 G/DL (ref 13–17.7)
IMM GRANULOCYTES # BLD AUTO: 0.02 10*3/MM3 (ref 0–0.05)
IMM GRANULOCYTES NFR BLD AUTO: 0.4 % (ref 0–0.5)
LYMPHOCYTES # BLD AUTO: 0.91 10*3/MM3 (ref 0.7–3.1)
LYMPHOCYTES NFR BLD AUTO: 17.2 % (ref 19.6–45.3)
MCH RBC QN AUTO: 29.5 PG (ref 26.6–33)
MCHC RBC AUTO-ENTMCNC: 32.8 G/DL (ref 31.5–35.7)
MCV RBC AUTO: 90 FL (ref 79–97)
MONOCYTES # BLD AUTO: 0.63 10*3/MM3 (ref 0.1–0.9)
MONOCYTES NFR BLD AUTO: 11.9 % (ref 5–12)
NEUTROPHILS # BLD AUTO: 3.05 10*3/MM3 (ref 1.7–7)
NEUTROPHILS NFR BLD AUTO: 57.6 % (ref 42.7–76)
NRBC BLD AUTO-RTO: 0 /100 WBC (ref 0–0.2)
PLATELET # BLD AUTO: 245 10*3/MM3 (ref 140–450)
PMV BLD AUTO: 11.2 FL (ref 6–12)
RBC # BLD AUTO: 3.49 10*6/MM3 (ref 4.14–5.8)
WBC NRBC COR # BLD: 5.29 10*3/MM3 (ref 3.4–10.8)

## 2020-02-14 PROCEDURE — 36415 COLL VENOUS BLD VENIPUNCTURE: CPT

## 2020-02-14 PROCEDURE — 85025 COMPLETE CBC W/AUTO DIFF WBC: CPT | Performed by: INTERNAL MEDICINE

## 2020-02-14 NOTE — NURSING NOTE
................Pt is here for lab with RN review.  CBC reviewed with pt, counts are stable for this pt at this time. Pt has no complaints.  Copy of labs given to pt and f/u appt reviewed. Pt is instructed to call the office with any concerns or new symptoms prior to next visit. Pt vu.  Lab Results   Component Value Date    WBC 5.29 02/14/2020    HGB 10.3 (L) 02/14/2020    HCT 31.4 (L) 02/14/2020    MCV 90.0 02/14/2020     02/14/2020   no procrit today.

## 2020-02-18 ENCOUNTER — APPOINTMENT (OUTPATIENT)
Dept: LAB | Facility: HOSPITAL | Age: 70
End: 2020-02-18

## 2020-02-21 ENCOUNTER — OFFICE VISIT (OUTPATIENT)
Dept: ONCOLOGY | Facility: CLINIC | Age: 70
End: 2020-02-21

## 2020-02-21 ENCOUNTER — LAB (OUTPATIENT)
Dept: OTHER | Facility: HOSPITAL | Age: 70
End: 2020-02-21

## 2020-02-21 ENCOUNTER — APPOINTMENT (OUTPATIENT)
Dept: ONCOLOGY | Facility: HOSPITAL | Age: 70
End: 2020-02-21

## 2020-02-21 VITALS
SYSTOLIC BLOOD PRESSURE: 155 MMHG | TEMPERATURE: 97.8 F | WEIGHT: 226.8 LBS | RESPIRATION RATE: 16 BRPM | OXYGEN SATURATION: 99 % | HEIGHT: 72 IN | HEART RATE: 50 BPM | BODY MASS INDEX: 30.72 KG/M2 | DIASTOLIC BLOOD PRESSURE: 73 MMHG

## 2020-02-21 DIAGNOSIS — D63.1 ANEMIA OF CHRONIC KIDNEY FAILURE, STAGE 4 (SEVERE) (HCC): Primary | ICD-10-CM

## 2020-02-21 DIAGNOSIS — N18.4 ANEMIA OF CHRONIC KIDNEY FAILURE, STAGE 4 (SEVERE) (HCC): Primary | ICD-10-CM

## 2020-02-21 DIAGNOSIS — N18.4 ANEMIA OF CHRONIC KIDNEY FAILURE, STAGE 4 (SEVERE) (HCC): ICD-10-CM

## 2020-02-21 DIAGNOSIS — D63.1 ANEMIA OF CHRONIC KIDNEY FAILURE, STAGE 4 (SEVERE) (HCC): ICD-10-CM

## 2020-02-21 LAB
BASOPHILS # BLD AUTO: 0.03 10*3/MM3 (ref 0–0.2)
BASOPHILS NFR BLD AUTO: 0.6 % (ref 0–1.5)
DEPRECATED RDW RBC AUTO: 46.7 FL (ref 37–54)
EOSINOPHIL # BLD AUTO: 0.73 10*3/MM3 (ref 0–0.4)
EOSINOPHIL NFR BLD AUTO: 14.4 % (ref 0.3–6.2)
ERYTHROCYTE [DISTWIDTH] IN BLOOD BY AUTOMATED COUNT: 14.5 % (ref 12.3–15.4)
HCT VFR BLD AUTO: 32.2 % (ref 37.5–51)
HGB BLD-MCNC: 10.6 G/DL (ref 13–17.7)
IMM GRANULOCYTES # BLD AUTO: 0.02 10*3/MM3 (ref 0–0.05)
IMM GRANULOCYTES NFR BLD AUTO: 0.4 % (ref 0–0.5)
LYMPHOCYTES # BLD AUTO: 0.95 10*3/MM3 (ref 0.7–3.1)
LYMPHOCYTES NFR BLD AUTO: 18.7 % (ref 19.6–45.3)
MCH RBC QN AUTO: 29.2 PG (ref 26.6–33)
MCHC RBC AUTO-ENTMCNC: 32.9 G/DL (ref 31.5–35.7)
MCV RBC AUTO: 88.7 FL (ref 79–97)
MONOCYTES # BLD AUTO: 0.5 10*3/MM3 (ref 0.1–0.9)
MONOCYTES NFR BLD AUTO: 9.8 % (ref 5–12)
NEUTROPHILS # BLD AUTO: 2.85 10*3/MM3 (ref 1.7–7)
NEUTROPHILS NFR BLD AUTO: 56.1 % (ref 42.7–76)
NRBC BLD AUTO-RTO: 0 /100 WBC (ref 0–0.2)
PLATELET # BLD AUTO: 264 10*3/MM3 (ref 140–450)
PMV BLD AUTO: 11.4 FL (ref 6–12)
RBC # BLD AUTO: 3.63 10*6/MM3 (ref 4.14–5.8)
WBC NRBC COR # BLD: 5.08 10*3/MM3 (ref 3.4–10.8)

## 2020-02-21 PROCEDURE — 99213 OFFICE O/P EST LOW 20 MIN: CPT | Performed by: INTERNAL MEDICINE

## 2020-02-21 PROCEDURE — 36415 COLL VENOUS BLD VENIPUNCTURE: CPT

## 2020-02-21 PROCEDURE — 85025 COMPLETE CBC W/AUTO DIFF WBC: CPT | Performed by: INTERNAL MEDICINE

## 2020-03-20 ENCOUNTER — APPOINTMENT (OUTPATIENT)
Dept: OTHER | Facility: HOSPITAL | Age: 70
End: 2020-03-20

## 2020-04-09 ENCOUNTER — APPOINTMENT (OUTPATIENT)
Dept: OTHER | Facility: HOSPITAL | Age: 70
End: 2020-04-09

## 2020-04-15 ENCOUNTER — TELEPHONE (OUTPATIENT)
Dept: ONCOLOGY | Facility: CLINIC | Age: 70
End: 2020-04-15

## 2020-04-15 NOTE — TELEPHONE ENCOUNTER
----- Message from Pollo Hernandez MD sent at 4/15/2020 11:16 AM EDT -----  Regarding: RE: FEELING TIRED ALL THE TIME/FALLING ASLEEP WHEN HE SITS DOWN  That's fine.  The lab will send the results to Dr. Conroy if she orders the labs though.  Telephone visit with me is fine a little while after the labs are done so the results are there.  [facility]    ----- Message -----  From: Layla Lorenzo  Sent: 4/15/2020   9:34 AM EDT  To: Pollo Hernandez MD  Subject: FEELING TIRED ALL THE TIME/FALLING ASLEEP WH#    PT STATES THAT FEELING TIRED ALL THE TIME/FALLING ASLEEP WHEN HE SITS DOWN, CONCERN THAT BLOOD IS LOW? DR CHRISTINA MELO WAS GOING TO SEND OVER LAB ORDERS TO HAVE LABS DRAWN AT OUR OFFICE TO WHAT SHE WANTED. PT STATED THAT IF HE COULD GET HIS LABS TO SEE WHAT MAY BE GOING ON,  MAYBE HAVE A TELEPHONE VISIT - OR GET THE RESULTS OF THE LABS. PLEASE ADVISE     THANK YOU   LAYLA

## 2020-04-15 NOTE — TELEPHONE ENCOUNTER
Called pt back about his appt date and time - dr hernandez will do a telephone call with pt after the results of labs are back. Pt is fine with this . Sent to keon

## 2020-04-17 ENCOUNTER — INFUSION (OUTPATIENT)
Dept: ONCOLOGY | Facility: HOSPITAL | Age: 70
End: 2020-04-17

## 2020-04-17 ENCOUNTER — LAB (OUTPATIENT)
Dept: OTHER | Facility: HOSPITAL | Age: 70
End: 2020-04-17

## 2020-04-17 ENCOUNTER — TELEPHONE (OUTPATIENT)
Dept: ONCOLOGY | Facility: CLINIC | Age: 70
End: 2020-04-17

## 2020-04-17 ENCOUNTER — OFFICE VISIT (OUTPATIENT)
Dept: ONCOLOGY | Facility: CLINIC | Age: 70
End: 2020-04-17

## 2020-04-17 VITALS
WEIGHT: 230 LBS | RESPIRATION RATE: 16 BRPM | BODY MASS INDEX: 31.36 KG/M2 | TEMPERATURE: 97.8 F | DIASTOLIC BLOOD PRESSURE: 66 MMHG | SYSTOLIC BLOOD PRESSURE: 152 MMHG | HEART RATE: 52 BPM | OXYGEN SATURATION: 99 %

## 2020-04-17 DIAGNOSIS — D63.1 ANEMIA OF CHRONIC KIDNEY FAILURE, STAGE 4 (SEVERE) (HCC): Primary | ICD-10-CM

## 2020-04-17 DIAGNOSIS — N18.4 ANEMIA OF CHRONIC KIDNEY FAILURE, STAGE 4 (SEVERE) (HCC): Primary | ICD-10-CM

## 2020-04-17 LAB
25(OH)D3 SERPL-MCNC: 22.6 NG/ML (ref 30–100)
ALBUMIN SERPL-MCNC: 3.5 G/DL (ref 3.5–5.2)
ALBUMIN/GLOB SERPL: 1.3 G/DL
ALP SERPL-CCNC: 136 U/L (ref 39–117)
ALT SERPL W P-5'-P-CCNC: 24 U/L (ref 1–41)
ANION GAP SERPL CALCULATED.3IONS-SCNC: 10.8 MMOL/L (ref 5–15)
AST SERPL-CCNC: 18 U/L (ref 1–40)
BASOPHILS # BLD AUTO: 0.03 10*3/MM3 (ref 0–0.2)
BASOPHILS NFR BLD AUTO: 0.7 % (ref 0–1.5)
BILIRUB SERPL-MCNC: 0.4 MG/DL (ref 0.1–1.2)
BUN BLD-MCNC: 93 MG/DL (ref 8–23)
BUN/CREAT SERPL: 22.5 (ref 7–25)
CALCIUM SPEC-SCNC: 9 MG/DL (ref 8.6–10.5)
CHLORIDE SERPL-SCNC: 99 MMOL/L (ref 98–107)
CO2 SERPL-SCNC: 23.2 MMOL/L (ref 22–29)
CREAT BLD-MCNC: 4.14 MG/DL (ref 0.76–1.27)
DEPRECATED RDW RBC AUTO: 52.7 FL (ref 37–54)
EOSINOPHIL # BLD AUTO: 0.44 10*3/MM3 (ref 0–0.4)
EOSINOPHIL NFR BLD AUTO: 9.7 % (ref 0.3–6.2)
ERYTHROCYTE [DISTWIDTH] IN BLOOD BY AUTOMATED COUNT: 16.2 % (ref 12.3–15.4)
FERRITIN SERPL-MCNC: 150.1 NG/ML (ref 30–400)
GFR SERPL CREATININE-BSD FRML MDRD: 14 ML/MIN/1.73
GFR SERPL CREATININE-BSD FRML MDRD: ABNORMAL ML/MIN/{1.73_M2}
GLOBULIN UR ELPH-MCNC: 2.6 GM/DL
GLUCOSE BLD-MCNC: 268 MG/DL (ref 65–99)
HCT VFR BLD AUTO: 30 % (ref 37.5–51)
HGB BLD-MCNC: 9.8 G/DL (ref 13–17.7)
HGB RETIC QN AUTO: 35.5 PG (ref 29.8–36.1)
IMM GRANULOCYTES # BLD AUTO: 0.01 10*3/MM3 (ref 0–0.05)
IMM GRANULOCYTES NFR BLD AUTO: 0.2 % (ref 0–0.5)
IMM RETICS NFR: 6.9 % (ref 3–15.8)
IRON 24H UR-MRATE: 103 MCG/DL (ref 59–158)
IRON SATN MFR SERPL: 36 % (ref 20–50)
LYMPHOCYTES # BLD AUTO: 0.7 10*3/MM3 (ref 0.7–3.1)
LYMPHOCYTES NFR BLD AUTO: 15.5 % (ref 19.6–45.3)
MCH RBC QN AUTO: 28.9 PG (ref 26.6–33)
MCHC RBC AUTO-ENTMCNC: 32.7 G/DL (ref 31.5–35.7)
MCV RBC AUTO: 88.5 FL (ref 79–97)
MONOCYTES # BLD AUTO: 0.4 10*3/MM3 (ref 0.1–0.9)
MONOCYTES NFR BLD AUTO: 8.8 % (ref 5–12)
NEUTROPHILS # BLD AUTO: 2.94 10*3/MM3 (ref 1.7–7)
NEUTROPHILS NFR BLD AUTO: 65.1 % (ref 42.7–76)
NRBC BLD AUTO-RTO: 0 /100 WBC (ref 0–0.2)
PHOSPHATE SERPL-MCNC: 5.2 MG/DL (ref 2.5–4.5)
PLATELET # BLD AUTO: 202 10*3/MM3 (ref 140–450)
PMV BLD AUTO: 11.2 FL (ref 6–12)
POTASSIUM BLD-SCNC: 5.1 MMOL/L (ref 3.5–5.2)
PROT SERPL-MCNC: 6.1 G/DL (ref 6–8.5)
RBC # BLD AUTO: 3.39 10*6/MM3 (ref 4.14–5.8)
RETICS/RBC NFR AUTO: 0.97 % (ref 0.7–1.9)
SODIUM BLD-SCNC: 133 MMOL/L (ref 136–145)
TIBC SERPL-MCNC: 286 MCG/DL (ref 298–536)
TRANSFERRIN SERPL-MCNC: 192 MG/DL (ref 200–360)
WBC NRBC COR # BLD: 4.52 10*3/MM3 (ref 3.4–10.8)

## 2020-04-17 PROCEDURE — 25010000002 EPOETIN ALFA PER 1000 UNITS: Performed by: INTERNAL MEDICINE

## 2020-04-17 PROCEDURE — 85046 RETICYTE/HGB CONCENTRATE: CPT | Performed by: INTERNAL MEDICINE

## 2020-04-17 PROCEDURE — 84100 ASSAY OF PHOSPHORUS: CPT | Performed by: INTERNAL MEDICINE

## 2020-04-17 PROCEDURE — 96372 THER/PROPH/DIAG INJ SC/IM: CPT

## 2020-04-17 PROCEDURE — 85025 COMPLETE CBC W/AUTO DIFF WBC: CPT | Performed by: INTERNAL MEDICINE

## 2020-04-17 PROCEDURE — 80053 COMPREHEN METABOLIC PANEL: CPT | Performed by: INTERNAL MEDICINE

## 2020-04-17 PROCEDURE — 99443 PR PHYS/QHP TELEPHONE EVALUATION 21-30 MIN: CPT | Performed by: INTERNAL MEDICINE

## 2020-04-17 PROCEDURE — 36415 COLL VENOUS BLD VENIPUNCTURE: CPT

## 2020-04-17 PROCEDURE — 82306 VITAMIN D 25 HYDROXY: CPT | Performed by: INTERNAL MEDICINE

## 2020-04-17 PROCEDURE — 84466 ASSAY OF TRANSFERRIN: CPT | Performed by: INTERNAL MEDICINE

## 2020-04-17 PROCEDURE — 82728 ASSAY OF FERRITIN: CPT | Performed by: INTERNAL MEDICINE

## 2020-04-17 PROCEDURE — 83540 ASSAY OF IRON: CPT | Performed by: INTERNAL MEDICINE

## 2020-04-17 RX ADMIN — ERYTHROPOIETIN 18000 UNITS: 20000 INJECTION, SOLUTION INTRAVENOUS; SUBCUTANEOUS at 13:49

## 2020-04-17 NOTE — PROGRESS NOTES
[facility] GROUP OUTPATIENT FOLLOW UP CLINIC VISIT    REASON FOR FOLLOW-UP:    1.  Normocytic anemia secondary to chronic kidney disease stage IV  2.  Faint monoclonal lambda light chain discovered on serum immunofixation.  Labs repeated here did not demonstrate abnormality.  3.  Procrit initiated on 12/19/2019 at 20,000 units with an excellent response in his hemoglobin  4.  Right partial nephrectomy on 1/3/2020 with findings including an epithelial cyst with a clear cell lining, background glomerulosclerosis inflammation and amorphous material consistent with chronic kidney disease.  Pathology forwarded to the [facility].  Result pending as of 1/17/2020.    HISTORY OF PRESENT ILLNESS:  Donald Johnson is a 69 y.o. male with the above-mentioned history, who returns the office today for scheduled follow-up.    In preparation for a right partial nephrectomy we did initiate Procrit on 12/19/2019 at 20,000 units.  His hemoglobin improved nicely from 9.4-10.1 and the Procrit was held on 12/30/2019.  He had a right partial nephrectomy on 1/3/2020.      He developed significant hematuria following that with significant anemia.  He required 2 units of packed red blood cells and further Procrit.  However, his hemoglobin improved significantly and he did not require Procrit since 1/31/2020.      He became more fatigued and is falling asleep a lot.  He had a recurrent right knee effusion requiring arthrocentesis an is now again having worsening right knee pain.  No fevers or chills.  No bleeding.      HEMATOLOGIC HISTORY:  He recently saw Dr. Conroy with nephrology.  Long history of type 2 diabetes and hypertension noted.  Labs showed a faint monoclonal lambda light chain on serum immunofixation.  Urine immunofixation was negative, only showing polyclonal light chains.  Serum protein electrophoresis was negative for a monoclonal protein.  His total protein level on the CMP was 6.0 with an albumin of 3.5.   His calcium was normal at 8.9.  His creatinine was elevated at 2.96.  He was anemic with hemoglobin of 9.7 with hematocrit 26%.  MCV was normal at 92.7.  White blood cells were normal at 7.3 with platelets 208,000.     Previously on 1/18/2016 his creatinine was 2.1 with a hemoglobin of 12.7.      Initial labs here in the office showed a normal serum protein electrophoresis with immunofixation on 2/26/2018.  Serum free light chain K/L ratio was normal although both light chains were elevated secondary to his kidney disease.  His erythropoietin level was normal at 7.7.  Creatinine was 2.64.  Iron studies and vitamin B12 are adequate.    ALLERGIES:  Allergies   Allergen Reactions   • Amlodipine Swelling     Worse LE edema.       MEDICATIONS:  The medication list has been reviewed with the patient by the medical assistant, and the list has been updated in the electronic medical record, which I reviewed.  Medication dosages and frequencies were confirmed to be accurate.    I have reviewed the patient's medical history in detail and updated the computerized patient record.    Review of Systems   Constitutional: Positive for fatigue (falling asleep easily.  ). Negative for appetite change, chills and fever.   HENT:   Negative for trouble swallowing.    Respiratory: Negative for cough and shortness of breath.    Cardiovascular: Negative for chest pain and leg swelling.   Gastrointestinal: Negative for abdominal distention, blood in stool, constipation, diarrhea and nausea.   Musculoskeletal: Positive for arthralgias (R knee pain and effusion) and back pain (chronic). Negative for myalgias.   Skin: Negative for rash.   Neurological: Negative for dizziness, extremity weakness and light-headedness.   Hematological: Does not bruise/bleed easily.         There were no vitals filed for this visit.    PHYSICAL EXAMINATION:  GENERAL:  Telephone visit today.  Speech is fluent.      DIAGNOSTIC DATA:    Results for orders placed or  performed in visit on 04/17/20   CBC Auto Differential   Result Value Ref Range    WBC 4.52 3.40 - 10.80 10*3/mm3    RBC 3.39 (L) 4.14 - 5.80 10*6/mm3    Hemoglobin 9.8 (L) 13.0 - 17.7 g/dL    Hematocrit 30.0 (L) 37.5 - 51.0 %    MCV 88.5 79.0 - 97.0 fL    MCH 28.9 26.6 - 33.0 pg    MCHC 32.7 31.5 - 35.7 g/dL    RDW 16.2 (H) 12.3 - 15.4 %    RDW-SD 52.7 37.0 - 54.0 fl    MPV 11.2 6.0 - 12.0 fL    Platelets 202 140 - 450 10*3/mm3    Neutrophil % 65.1 42.7 - 76.0 %    Lymphocyte % 15.5 (L) 19.6 - 45.3 %    Monocyte % 8.8 5.0 - 12.0 %    Eosinophil % 9.7 (H) 0.3 - 6.2 %    Basophil % 0.7 0.0 - 1.5 %    Immature Grans % 0.2 0.0 - 0.5 %    Neutrophils, Absolute 2.94 1.70 - 7.00 10*3/mm3    Lymphocytes, Absolute 0.70 0.70 - 3.10 10*3/mm3    Monocytes, Absolute 0.40 0.10 - 0.90 10*3/mm3    Eosinophils, Absolute 0.44 (H) 0.00 - 0.40 10*3/mm3    Basophils, Absolute 0.03 0.00 - 0.20 10*3/mm3    Immature Grans, Absolute 0.01 0.00 - 0.05 10*3/mm3    nRBC 0.0 0.0 - 0.2 /100 WBC   Comprehensive Metabolic Panel   Result Value Ref Range    Glucose 268 (H) 65 - 99 mg/dL    BUN 93 (H) 8 - 23 mg/dL    Creatinine 4.14 (H) 0.76 - 1.27 mg/dL    Sodium 133 (L) 136 - 145 mmol/L    Potassium 5.1 3.5 - 5.2 mmol/L    Chloride 99 98 - 107 mmol/L    CO2 23.2 22.0 - 29.0 mmol/L    Calcium 9.0 8.6 - 10.5 mg/dL    Total Protein 6.1 6.0 - 8.5 g/dL    Albumin 3.50 3.50 - 5.20 g/dL    ALT (SGPT) 24 1 - 41 U/L    AST (SGOT) 18 1 - 40 U/L    Alkaline Phosphatase 136 (H) 39 - 117 U/L    Total Bilirubin 0.4 0.1 - 1.2 mg/dL    eGFR Non African Amer 14 (L) >60 mL/min/1.73    eGFR  African Amer      Globulin 2.6 gm/dL    A/G Ratio 1.3 g/dL    BUN/Creatinine Ratio 22.5 7.0 - 25.0    Anion Gap 10.8 5.0 - 15.0 mmol/L   Phosphorus   Result Value Ref Range    Phosphorus 5.2 (H) 2.5 - 4.5 mg/dL   Vitamin D 25 Hydroxy   Result Value Ref Range    25 Hydroxy, Vitamin D 22.6 (L) 30.0 - 100.0 ng/ml   Retic With IRF & RET-He   Result Value Ref Range    Immature  Reticulocyte Fraction 6.9 3.0 - 15.8 %    Reticulocyte % 0.97 0.70 - 1.90 %    Reticulocyte Hgb 35.5 29.8 - 36.1 pg   Iron Profile   Result Value Ref Range    Iron 103 59 - 158 mcg/dL    Iron Saturation 36 20 - 50 %    Transferrin 192 (L) 200 - 360 mg/dL    TIBC 286 (L) 298 - 536 mcg/dL   Ferritin   Result Value Ref Range    Ferritin 150.10 30.00 - 400.00 ng/mL     IMAGING: None reviewed    ASSESSMENT:  This is a 69 y.o. male with:  1.  Chronic kidney disease stage 4.  He follows with Dr. Conroy with nephrology.  Hopeful for a kidney transplant at some point in the future.  Nearing hemodialysis     2.  Normocytic anemia related chronic kidney disease: With hemoglobin of 9.4 on 12/19/2019 he initiated Procrit 20,000 units.  Hemoglobin improved to 10.1 as of 12/30/2019.  Hemoglobin dropped after his right partial nephrectomy to 7.0 on 1/4/2020 and then 6.8 here in the office on 1/17/2020 with significant hematuria.    He received Procrit through 1/31/2020.  He received 2 units of packed red blood cells.      His hemoglobin improved nicely to 10.6     Declined to 9.8 today.    Procrit per protocol today.      3.  Faint monoclonal lambda light chain on serum immunofixation: Clinically insignificant and this was not visible on repeat labs here on 2/26/2018.  Repeat labs 12/6/2018 without significant abnormality.     4.  Right kidney lesion status post right partial nephrectomy on 1/3/2020.  Final pathology on review of the [facility] is benign.    5.  Hematuria following right partial nephrectomy.  Resolved.      6.  R knee effusion:  May need another arthrocentesis in the near future.        PLAN:  1. Procrit today per protocol.    2. CBC, CMP, phos in 6 weeks with Procrit if appropriate  3. I will see him in 3 months with a CBC, CMP, phos, iron profile, ferritin, retic count with Procrit if appropriate    Discussed with Dr. Conroy.    You have chosen to receive care through a telephone visit. Do you consent  to use a telephone visit for your medical care today? Yes    25 minutes today coordinating care and arranging his Procrit.      Pollo Hernandez MD

## 2020-04-17 NOTE — TELEPHONE ENCOUNTER
----- Message from Pollo Hernandez MD sent at 4/17/2020 12:24 PM EDT -----  Regarding: Procrit  Iron studies and ferritin look good for Procrit today.  Please see if he wants to come back into the office today for procrit.  Dose per protocol.  Thanks, ABY

## 2020-04-20 ENCOUNTER — DOCUMENTATION (OUTPATIENT)
Dept: ONCOLOGY | Facility: HOSPITAL | Age: 70
End: 2020-04-20

## 2020-04-20 NOTE — NURSING NOTE
BLood pressure has been elevated.  Reviewed with Dr. Hernandez and he wants PCP to adjust medication for blood pressure.

## 2020-04-21 ENCOUNTER — TELEPHONE (OUTPATIENT)
Dept: ONCOLOGY | Facility: CLINIC | Age: 70
End: 2020-04-21

## 2020-04-21 NOTE — TELEPHONE ENCOUNTER
----- Message from Irlanda Bergeron sent at 4/20/2020  8:45 AM EDT -----  Izzy,     His RN review and procrit could be combined into just Injection chair. If you could fix please when you get a sec.    Thank you!    Darline

## 2020-05-29 ENCOUNTER — INFUSION (OUTPATIENT)
Dept: ONCOLOGY | Facility: HOSPITAL | Age: 70
End: 2020-05-29

## 2020-05-29 ENCOUNTER — LAB (OUTPATIENT)
Dept: OTHER | Facility: HOSPITAL | Age: 70
End: 2020-05-29

## 2020-05-29 VITALS
OXYGEN SATURATION: 99 % | DIASTOLIC BLOOD PRESSURE: 66 MMHG | HEIGHT: 73 IN | BODY MASS INDEX: 30.3 KG/M2 | WEIGHT: 228.6 LBS | TEMPERATURE: 98.1 F | RESPIRATION RATE: 18 BRPM | HEART RATE: 50 BPM | SYSTOLIC BLOOD PRESSURE: 152 MMHG

## 2020-05-29 DIAGNOSIS — N18.4 ANEMIA OF CHRONIC KIDNEY FAILURE, STAGE 4 (SEVERE) (HCC): ICD-10-CM

## 2020-05-29 DIAGNOSIS — D63.1 ANEMIA OF CHRONIC KIDNEY FAILURE, STAGE 4 (SEVERE) (HCC): Primary | ICD-10-CM

## 2020-05-29 DIAGNOSIS — D63.1 ANEMIA OF CHRONIC KIDNEY FAILURE, STAGE 4 (SEVERE) (HCC): ICD-10-CM

## 2020-05-29 DIAGNOSIS — N18.4 ANEMIA OF CHRONIC KIDNEY FAILURE, STAGE 4 (SEVERE) (HCC): Primary | ICD-10-CM

## 2020-05-29 LAB
ALBUMIN SERPL-MCNC: 3.9 G/DL (ref 3.5–5.2)
ALBUMIN/GLOB SERPL: 1.4 G/DL
ALP SERPL-CCNC: 114 U/L (ref 39–117)
ALT SERPL W P-5'-P-CCNC: 32 U/L (ref 1–41)
ANION GAP SERPL CALCULATED.3IONS-SCNC: 9.8 MMOL/L (ref 5–15)
AST SERPL-CCNC: 27 U/L (ref 1–40)
BASOPHILS # BLD AUTO: 0.02 10*3/MM3 (ref 0–0.2)
BASOPHILS NFR BLD AUTO: 0.4 % (ref 0–1.5)
BILIRUB SERPL-MCNC: 0.4 MG/DL (ref 0.1–1.2)
BUN BLD-MCNC: 109 MG/DL (ref 8–23)
BUN/CREAT SERPL: 23.9 (ref 7–25)
CALCIUM SPEC-SCNC: 8.9 MG/DL (ref 8.6–10.5)
CHLORIDE SERPL-SCNC: 102 MMOL/L (ref 98–107)
CO2 SERPL-SCNC: 22.2 MMOL/L (ref 22–29)
CREAT BLD-MCNC: 4.57 MG/DL (ref 0.76–1.27)
DEPRECATED RDW RBC AUTO: 55.8 FL (ref 37–54)
EOSINOPHIL # BLD AUTO: 0.62 10*3/MM3 (ref 0–0.4)
EOSINOPHIL NFR BLD AUTO: 11.7 % (ref 0.3–6.2)
ERYTHROCYTE [DISTWIDTH] IN BLOOD BY AUTOMATED COUNT: 16.9 % (ref 12.3–15.4)
GFR SERPL CREATININE-BSD FRML MDRD: 13 ML/MIN/1.73
GFR SERPL CREATININE-BSD FRML MDRD: ABNORMAL ML/MIN/{1.73_M2}
GLOBULIN UR ELPH-MCNC: 2.8 GM/DL
GLUCOSE BLD-MCNC: 144 MG/DL (ref 65–99)
HCT VFR BLD AUTO: 27.6 % (ref 37.5–51)
HGB BLD-MCNC: 9 G/DL (ref 13–17.7)
IMM GRANULOCYTES # BLD AUTO: 0.01 10*3/MM3 (ref 0–0.05)
IMM GRANULOCYTES NFR BLD AUTO: 0.2 % (ref 0–0.5)
LYMPHOCYTES # BLD AUTO: 0.84 10*3/MM3 (ref 0.7–3.1)
LYMPHOCYTES NFR BLD AUTO: 15.8 % (ref 19.6–45.3)
MCH RBC QN AUTO: 29.5 PG (ref 26.6–33)
MCHC RBC AUTO-ENTMCNC: 32.6 G/DL (ref 31.5–35.7)
MCV RBC AUTO: 90.5 FL (ref 79–97)
MONOCYTES # BLD AUTO: 0.44 10*3/MM3 (ref 0.1–0.9)
MONOCYTES NFR BLD AUTO: 8.3 % (ref 5–12)
NEUTROPHILS # BLD AUTO: 3.38 10*3/MM3 (ref 1.7–7)
NEUTROPHILS NFR BLD AUTO: 63.6 % (ref 42.7–76)
NRBC BLD AUTO-RTO: 0 /100 WBC (ref 0–0.2)
PHOSPHATE SERPL-MCNC: 5.3 MG/DL (ref 2.5–4.5)
PLATELET # BLD AUTO: 208 10*3/MM3 (ref 140–450)
PMV BLD AUTO: 11.1 FL (ref 6–12)
POTASSIUM BLD-SCNC: 4.8 MMOL/L (ref 3.5–5.2)
PROT SERPL-MCNC: 6.7 G/DL (ref 6–8.5)
RBC # BLD AUTO: 3.05 10*6/MM3 (ref 4.14–5.8)
SODIUM BLD-SCNC: 134 MMOL/L (ref 136–145)
WBC NRBC COR # BLD: 5.31 10*3/MM3 (ref 3.4–10.8)

## 2020-05-29 PROCEDURE — 25010000002 EPOETIN ALFA PER 1000 UNITS: Performed by: NURSE PRACTITIONER

## 2020-05-29 PROCEDURE — 96372 THER/PROPH/DIAG INJ SC/IM: CPT

## 2020-05-29 PROCEDURE — 84100 ASSAY OF PHOSPHORUS: CPT | Performed by: INTERNAL MEDICINE

## 2020-05-29 PROCEDURE — 36415 COLL VENOUS BLD VENIPUNCTURE: CPT

## 2020-05-29 PROCEDURE — 80053 COMPREHEN METABOLIC PANEL: CPT | Performed by: INTERNAL MEDICINE

## 2020-05-29 PROCEDURE — 85025 COMPLETE CBC W/AUTO DIFF WBC: CPT | Performed by: INTERNAL MEDICINE

## 2020-05-29 RX ADMIN — ERYTHROPOIETIN 18000 UNITS: 20000 INJECTION, SOLUTION INTRAVENOUS; SUBCUTANEOUS at 13:13

## 2020-05-29 NOTE — NURSING NOTE
Lab Results   Component Value Date    WBC 5.31 05/29/2020    HGB 9.0 (L) 05/29/2020    HCT 27.6 (L) 05/29/2020    MCV 90.5 05/29/2020     05/29/2020     Procrit given per protocol.  Pt voices fatigue but says it has been ongoing.  He meets with kidney MD to assess possible need for dialysis.  Next appt reviewed and pt instructed to call sooner with concerns and v/u.

## 2020-07-10 ENCOUNTER — LAB (OUTPATIENT)
Dept: OTHER | Facility: HOSPITAL | Age: 70
End: 2020-07-10

## 2020-07-10 ENCOUNTER — OFFICE VISIT (OUTPATIENT)
Dept: ONCOLOGY | Facility: CLINIC | Age: 70
End: 2020-07-10

## 2020-07-10 ENCOUNTER — INFUSION (OUTPATIENT)
Dept: ONCOLOGY | Facility: HOSPITAL | Age: 70
End: 2020-07-10

## 2020-07-10 VITALS
WEIGHT: 222.7 LBS | DIASTOLIC BLOOD PRESSURE: 67 MMHG | HEART RATE: 58 BPM | RESPIRATION RATE: 16 BRPM | HEIGHT: 72 IN | SYSTOLIC BLOOD PRESSURE: 125 MMHG | BODY MASS INDEX: 30.16 KG/M2 | OXYGEN SATURATION: 97 % | TEMPERATURE: 98.2 F

## 2020-07-10 DIAGNOSIS — N18.4 ANEMIA OF CHRONIC KIDNEY FAILURE, STAGE 4 (SEVERE) (HCC): Primary | ICD-10-CM

## 2020-07-10 DIAGNOSIS — N18.4 ANEMIA OF CHRONIC KIDNEY FAILURE, STAGE 4 (SEVERE) (HCC): ICD-10-CM

## 2020-07-10 DIAGNOSIS — D63.1 ANEMIA OF CHRONIC KIDNEY FAILURE, STAGE 4 (SEVERE) (HCC): Primary | ICD-10-CM

## 2020-07-10 DIAGNOSIS — D63.1 ANEMIA OF CHRONIC KIDNEY FAILURE, STAGE 4 (SEVERE) (HCC): ICD-10-CM

## 2020-07-10 LAB
ALBUMIN SERPL-MCNC: 3.8 G/DL (ref 3.5–5.2)
ALBUMIN/GLOB SERPL: 1.5 G/DL
ALP SERPL-CCNC: 102 U/L (ref 39–117)
ALT SERPL W P-5'-P-CCNC: 29 U/L (ref 1–41)
ANION GAP SERPL CALCULATED.3IONS-SCNC: 11.1 MMOL/L (ref 5–15)
AST SERPL-CCNC: 24 U/L (ref 1–40)
BASOPHILS # BLD AUTO: 0.02 10*3/MM3 (ref 0–0.2)
BASOPHILS NFR BLD AUTO: 0.4 % (ref 0–1.5)
BILIRUB SERPL-MCNC: 0.3 MG/DL (ref 0–1.2)
BUN SERPL-MCNC: 110 MG/DL (ref 8–23)
BUN/CREAT SERPL: 22.6 (ref 7–25)
CALCIUM SPEC-SCNC: 9.3 MG/DL (ref 8.6–10.5)
CHLORIDE SERPL-SCNC: 103 MMOL/L (ref 98–107)
CO2 SERPL-SCNC: 22.9 MMOL/L (ref 22–29)
CREAT SERPL-MCNC: 4.87 MG/DL (ref 0.76–1.27)
DEPRECATED RDW RBC AUTO: 48.4 FL (ref 37–54)
EOSINOPHIL # BLD AUTO: 0.43 10*3/MM3 (ref 0–0.4)
EOSINOPHIL NFR BLD AUTO: 8.4 % (ref 0.3–6.2)
ERYTHROCYTE [DISTWIDTH] IN BLOOD BY AUTOMATED COUNT: 13.5 % (ref 12.3–15.4)
FERRITIN SERPL-MCNC: 231.5 NG/ML (ref 30–400)
GFR SERPL CREATININE-BSD FRML MDRD: 12 ML/MIN/1.73
GFR SERPL CREATININE-BSD FRML MDRD: ABNORMAL ML/MIN/{1.73_M2}
GLOBULIN UR ELPH-MCNC: 2.5 GM/DL
GLUCOSE SERPL-MCNC: 149 MG/DL (ref 65–99)
HCT VFR BLD AUTO: 25.3 % (ref 37.5–51)
HGB BLD-MCNC: 8.3 G/DL (ref 13–17.7)
HGB RETIC QN AUTO: 35.4 PG (ref 29.8–36.1)
IMM GRANULOCYTES # BLD AUTO: 0.01 10*3/MM3 (ref 0–0.05)
IMM GRANULOCYTES NFR BLD AUTO: 0.2 % (ref 0–0.5)
IMM RETICS NFR: 4.9 % (ref 3–15.8)
IRON 24H UR-MRATE: 129 MCG/DL (ref 59–158)
IRON SATN MFR SERPL: 43 % (ref 20–50)
LYMPHOCYTES # BLD AUTO: 0.83 10*3/MM3 (ref 0.7–3.1)
LYMPHOCYTES NFR BLD AUTO: 16.3 % (ref 19.6–45.3)
MCH RBC QN AUTO: 32 PG (ref 26.6–33)
MCHC RBC AUTO-ENTMCNC: 32.8 G/DL (ref 31.5–35.7)
MCV RBC AUTO: 97.7 FL (ref 79–97)
MONOCYTES # BLD AUTO: 0.47 10*3/MM3 (ref 0.1–0.9)
MONOCYTES NFR BLD AUTO: 9.2 % (ref 5–12)
NEUTROPHILS NFR BLD AUTO: 3.33 10*3/MM3 (ref 1.7–7)
NEUTROPHILS NFR BLD AUTO: 65.5 % (ref 42.7–76)
NRBC BLD AUTO-RTO: 0 /100 WBC (ref 0–0.2)
PHOSPHATE SERPL-MCNC: 5.8 MG/DL (ref 2.5–4.5)
PLATELET # BLD AUTO: 188 10*3/MM3 (ref 140–450)
PMV BLD AUTO: 10.5 FL (ref 6–12)
POTASSIUM SERPL-SCNC: 5.2 MMOL/L (ref 3.5–5.2)
PROT SERPL-MCNC: 6.3 G/DL (ref 6–8.5)
RBC # BLD AUTO: 2.59 10*6/MM3 (ref 4.14–5.8)
RETICS/RBC NFR AUTO: 1.54 % (ref 0.7–1.9)
SODIUM SERPL-SCNC: 137 MMOL/L (ref 136–145)
TIBC SERPL-MCNC: 302 MCG/DL (ref 298–536)
TRANSFERRIN SERPL-MCNC: 203 MG/DL (ref 200–360)
WBC # BLD AUTO: 5.09 10*3/MM3 (ref 3.4–10.8)

## 2020-07-10 PROCEDURE — 85046 RETICYTE/HGB CONCENTRATE: CPT | Performed by: INTERNAL MEDICINE

## 2020-07-10 PROCEDURE — 84466 ASSAY OF TRANSFERRIN: CPT | Performed by: INTERNAL MEDICINE

## 2020-07-10 PROCEDURE — 80053 COMPREHEN METABOLIC PANEL: CPT | Performed by: INTERNAL MEDICINE

## 2020-07-10 PROCEDURE — 96372 THER/PROPH/DIAG INJ SC/IM: CPT

## 2020-07-10 PROCEDURE — 83540 ASSAY OF IRON: CPT | Performed by: INTERNAL MEDICINE

## 2020-07-10 PROCEDURE — 36415 COLL VENOUS BLD VENIPUNCTURE: CPT

## 2020-07-10 PROCEDURE — 82728 ASSAY OF FERRITIN: CPT | Performed by: INTERNAL MEDICINE

## 2020-07-10 PROCEDURE — 84100 ASSAY OF PHOSPHORUS: CPT | Performed by: INTERNAL MEDICINE

## 2020-07-10 PROCEDURE — 99213 OFFICE O/P EST LOW 20 MIN: CPT | Performed by: INTERNAL MEDICINE

## 2020-07-10 PROCEDURE — 85025 COMPLETE CBC W/AUTO DIFF WBC: CPT | Performed by: INTERNAL MEDICINE

## 2020-07-10 PROCEDURE — 25010000002 EPOETIN ALFA PER 1000 UNITS: Performed by: INTERNAL MEDICINE

## 2020-07-10 RX ORDER — TAMSULOSIN HYDROCHLORIDE 0.4 MG/1
2 CAPSULE ORAL NIGHTLY
COMMUNITY
Start: 2020-06-02

## 2020-07-10 RX ORDER — ALLOPURINOL 100 MG/1
150 TABLET ORAL
COMMUNITY
Start: 2020-04-23 | End: 2021-03-02

## 2020-07-10 RX ADMIN — ERYTHROPOIETIN 18000 UNITS: 20000 INJECTION, SOLUTION INTRAVENOUS; SUBCUTANEOUS at 12:11

## 2020-07-10 NOTE — PROGRESS NOTES
[facility] GROUP OUTPATIENT FOLLOW UP CLINIC VISIT    REASON FOR FOLLOW-UP:    1.  Normocytic anemia secondary to chronic kidney disease stage IV  2.  Faint monoclonal lambda light chain discovered on serum immunofixation.  Labs repeated here did not demonstrate abnormality.  3.  Procrit initiated on 12/19/2019 at 20,000 units with an excellent response in his hemoglobin  4.  Right partial nephrectomy on 1/3/2020 with findings including an epithelial cyst with a clear cell lining, background glomerulosclerosis inflammation and amorphous material consistent with chronic kidney disease.  Pathology forwarded to the [facility].  Negative for malignanch.    HISTORY OF PRESENT ILLNESS:  Donald Johnson is a 70 y.o. male with the above-mentioned history, who returns the office today for scheduled follow-up.    We resumed Procrit on 4/17 with a hgb of 9.8.      Hgb 9.0 on 5/29/2020 and he received 18,000 units again that day.      He reports ongoing and worsening fatigue.  He denies any bleeding.  Good urine output.  No fevers or chills.      HEMATOLOGIC HISTORY:  He recently saw Dr. Conroy with nephrology.  Long history of type 2 diabetes and hypertension noted.  Labs showed a faint monoclonal lambda light chain on serum immunofixation.  Urine immunofixation was negative, only showing polyclonal light chains.  Serum protein electrophoresis was negative for a monoclonal protein.  His total protein level on the CMP was 6.0 with an albumin of 3.5.  His calcium was normal at 8.9.  His creatinine was elevated at 2.96.  He was anemic with hemoglobin of 9.7 with hematocrit 26%.  MCV was normal at 92.7.  White blood cells were normal at 7.3 with platelets 208,000.     Previously on 1/18/2016 his creatinine was 2.1 with a hemoglobin of 12.7.      Initial labs here in the office showed a normal serum protein electrophoresis with immunofixation on 2/26/2018.  Serum free light chain K/L ratio was normal although  "both light chains were elevated secondary to his kidney disease.  His erythropoietin level was normal at 7.7.  Creatinine was 2.64.  Iron studies and vitamin B12 are adequate.    ALLERGIES:  Allergies   Allergen Reactions   • Amlodipine Swelling     Worse LE edema.       MEDICATIONS:  The medication list has been reviewed with the patient by the medical assistant, and the list has been updated in the electronic medical record, which I reviewed.  Medication dosages and frequencies were confirmed to be accurate.    I have reviewed the patient's medical history in detail and updated the computerized patient record.    Review of Systems   Constitutional: Positive for fatigue (Worsening). Negative for appetite change, chills and fever.   HENT:   Negative for trouble swallowing.    Respiratory: Negative for cough and shortness of breath.    Cardiovascular: Negative for chest pain and leg swelling.   Gastrointestinal: Negative for abdominal distention, blood in stool, constipation, diarrhea and nausea.   Musculoskeletal: Positive for arthralgias (R knee pain and effusion) and back pain (chronic). Negative for myalgias.   Skin: Negative for rash.   Neurological: Negative for dizziness, extremity weakness and light-headedness.   Hematological: Does not bruise/bleed easily.         Vitals:    07/10/20 1101   BP: 125/67   Pulse: 58   Resp: 16   Temp: 98.2 °F (36.8 °C)   TempSrc: Temporal   SpO2: 97%   Weight: 101 kg (222 lb 11.2 oz)   Height: 182.4 cm (71.81\")   PainSc: 0-No pain  Comment: anemia       PHYSICAL EXAMINATION:  General:  No acute distress, awake, alert and oriented  Skin:  Warm and dry, no visible rash  HEENT:  normocephalic/atraumatic.  Wearing a mask.  Chest:  Normal respiratory effort  Extremities:  No visible clubbing, cyanosis, or edema  Neuro/psych:  Grossly non-focal.  Normal mood and affect.      DIAGNOSTIC DATA:    Results for orders placed or performed in visit on 07/10/20   Comprehensive Metabolic " Panel   Result Value Ref Range    Glucose 149 (H) 65 - 99 mg/dL     (H) 8 - 23 mg/dL    Creatinine 4.87 (H) 0.76 - 1.27 mg/dL    Sodium 137 136 - 145 mmol/L    Potassium 5.2 3.5 - 5.2 mmol/L    Chloride 103 98 - 107 mmol/L    CO2 22.9 22.0 - 29.0 mmol/L    Calcium 9.3 8.6 - 10.5 mg/dL    Total Protein 6.3 6.0 - 8.5 g/dL    Albumin 3.80 3.50 - 5.20 g/dL    ALT (SGPT) 29 1 - 41 U/L    AST (SGOT) 24 1 - 40 U/L    Alkaline Phosphatase 102 39 - 117 U/L    Total Bilirubin 0.3 0.0 - 1.2 mg/dL    eGFR Non African Amer 12 (L) >60 mL/min/1.73    eGFR  African Amer      Globulin 2.5 gm/dL    A/G Ratio 1.5 g/dL    BUN/Creatinine Ratio 22.6 7.0 - 25.0    Anion Gap 11.1 5.0 - 15.0 mmol/L   Phosphorus   Result Value Ref Range    Phosphorus 5.8 (H) 2.5 - 4.5 mg/dL   Ferritin   Result Value Ref Range    Ferritin 231.50 30.00 - 400.00 ng/mL   Iron Profile   Result Value Ref Range    Iron 129 59 - 158 mcg/dL    Iron Saturation 43 20 - 50 %    Transferrin 203 200 - 360 mg/dL    TIBC 302 298 - 536 mcg/dL   Retic With IRF & RET-He   Result Value Ref Range    Immature Reticulocyte Fraction 4.9 3.0 - 15.8 %    Reticulocyte % 1.54 0.70 - 1.90 %    Reticulocyte Hgb 35.4 29.8 - 36.1 pg   CBC Auto Differential   Result Value Ref Range    WBC 5.09 3.40 - 10.80 10*3/mm3    RBC 2.59 (L) 4.14 - 5.80 10*6/mm3    Hemoglobin 8.3 (L) 13.0 - 17.7 g/dL    Hematocrit 25.3 (L) 37.5 - 51.0 %    MCV 97.7 (H) 79.0 - 97.0 fL    MCH 32.0 26.6 - 33.0 pg    MCHC 32.8 31.5 - 35.7 g/dL    RDW 13.5 12.3 - 15.4 %    RDW-SD 48.4 37.0 - 54.0 fl    MPV 10.5 6.0 - 12.0 fL    Platelets 188 140 - 450 10*3/mm3    Neutrophil % 65.5 42.7 - 76.0 %    Lymphocyte % 16.3 (L) 19.6 - 45.3 %    Monocyte % 9.2 5.0 - 12.0 %    Eosinophil % 8.4 (H) 0.3 - 6.2 %    Basophil % 0.4 0.0 - 1.5 %    Immature Grans % 0.2 0.0 - 0.5 %    Neutrophils, Absolute 3.33 1.70 - 7.00 10*3/mm3    Lymphocytes, Absolute 0.83 0.70 - 3.10 10*3/mm3    Monocytes, Absolute 0.47 0.10 - 0.90 10*3/mm3     Eosinophils, Absolute 0.43 (H) 0.00 - 0.40 10*3/mm3    Basophils, Absolute 0.02 0.00 - 0.20 10*3/mm3    Immature Grans, Absolute 0.01 0.00 - 0.05 10*3/mm3    nRBC 0.0 0.0 - 0.2 /100 WBC     IMAGING: None reviewed    ASSESSMENT:  This is a 70 y.o. male with:  1.  Chronic kidney disease stage 4.  He follows with Dr. Conroy with nephrology.  Hopeful for a kidney transplant at some point in the future.  Nearing hemodialysis     2.  Normocytic anemia related chronic kidney disease: With hemoglobin of 9.4 on 12/19/2019 he initiated Procrit 20,000 units.  Hemoglobin improved to 10.1 as of 12/30/2019.  Hemoglobin dropped after his right partial nephrectomy to 7.0 on 1/4/2020 and then 6.8 here in the office on 1/17/2020 with significant hematuria.    He received Procrit through 1/31/2020.  He received 2 units of packed red blood cells.      His hemoglobin improved nicely to 10.6     Declined to 9.8 on 4/17 and we resumed Procrit at 18,000 units which he also received on 5/29 for a hgb of 9.0.    Procrit per protocol today.      I advised him today that we will need to increase his CBCs and Procrit to weekly at this point since the current dosing schedule is not effective.    3.  Faint monoclonal lambda light chain on serum immunofixation: Clinically insignificant and this was not visible on repeat labs here on 2/26/2018.  Repeat labs 12/6/2018 without significant abnormality.     4.  Right kidney lesion status post right partial nephrectomy on 1/3/2020.  Final pathology on review of the [facility] is benign.    5.  Hematuria following right partial nephrectomy.  Resolved.      6.  R knee effusion:  May need another arthrocentesis in the near future.        PLAN:  1. Procrit today per protocol.    2. At this point we will proceed with weekly CBCs and Procrit if appropriate for a hemoglobin of 10 or less.  3. I will see him in 3 months for follow-up with a CBC, CMP, phosphorus, iron panel, ferritin, reticulocyte  count.      Pollo Hernandez MD

## 2020-07-17 ENCOUNTER — INFUSION (OUTPATIENT)
Dept: ONCOLOGY | Facility: HOSPITAL | Age: 70
End: 2020-07-17

## 2020-07-17 ENCOUNTER — LAB (OUTPATIENT)
Dept: OTHER | Facility: HOSPITAL | Age: 70
End: 2020-07-17

## 2020-07-17 VITALS
DIASTOLIC BLOOD PRESSURE: 56 MMHG | OXYGEN SATURATION: 98 % | BODY MASS INDEX: 30.54 KG/M2 | WEIGHT: 224 LBS | TEMPERATURE: 98 F | SYSTOLIC BLOOD PRESSURE: 134 MMHG | HEART RATE: 54 BPM

## 2020-07-17 DIAGNOSIS — N18.4 ANEMIA OF CHRONIC KIDNEY FAILURE, STAGE 4 (SEVERE) (HCC): Primary | ICD-10-CM

## 2020-07-17 DIAGNOSIS — D63.1 ANEMIA OF CHRONIC KIDNEY FAILURE, STAGE 4 (SEVERE) (HCC): Primary | ICD-10-CM

## 2020-07-17 DIAGNOSIS — D63.1 ANEMIA OF CHRONIC KIDNEY FAILURE, STAGE 4 (SEVERE) (HCC): ICD-10-CM

## 2020-07-17 DIAGNOSIS — N18.4 ANEMIA OF CHRONIC KIDNEY FAILURE, STAGE 4 (SEVERE) (HCC): ICD-10-CM

## 2020-07-17 LAB
BASOPHILS # BLD AUTO: 0.03 10*3/MM3 (ref 0–0.2)
BASOPHILS NFR BLD AUTO: 0.5 % (ref 0–1.5)
DEPRECATED RDW RBC AUTO: 47.9 FL (ref 37–54)
EOSINOPHIL # BLD AUTO: 0.65 10*3/MM3 (ref 0–0.4)
EOSINOPHIL NFR BLD AUTO: 11.3 % (ref 0.3–6.2)
ERYTHROCYTE [DISTWIDTH] IN BLOOD BY AUTOMATED COUNT: 14.3 % (ref 12.3–15.4)
HCT VFR BLD AUTO: 25.1 % (ref 37.5–51)
HGB BLD-MCNC: 8.4 G/DL (ref 13–17.7)
IMM GRANULOCYTES # BLD AUTO: 0.05 10*3/MM3 (ref 0–0.05)
IMM GRANULOCYTES NFR BLD AUTO: 0.9 % (ref 0–0.5)
LYMPHOCYTES # BLD AUTO: 1.04 10*3/MM3 (ref 0.7–3.1)
LYMPHOCYTES NFR BLD AUTO: 18.1 % (ref 19.6–45.3)
MCH RBC QN AUTO: 31.9 PG (ref 26.6–33)
MCHC RBC AUTO-ENTMCNC: 33.5 G/DL (ref 31.5–35.7)
MCV RBC AUTO: 95.4 FL (ref 79–97)
MONOCYTES # BLD AUTO: 0.71 10*3/MM3 (ref 0.1–0.9)
MONOCYTES NFR BLD AUTO: 12.3 % (ref 5–12)
NEUTROPHILS NFR BLD AUTO: 3.27 10*3/MM3 (ref 1.7–7)
NEUTROPHILS NFR BLD AUTO: 56.9 % (ref 42.7–76)
NRBC BLD AUTO-RTO: 0 /100 WBC (ref 0–0.2)
PLATELET # BLD AUTO: 199 10*3/MM3 (ref 140–450)
PMV BLD AUTO: 11.3 FL (ref 6–12)
RBC # BLD AUTO: 2.63 10*6/MM3 (ref 4.14–5.8)
WBC # BLD AUTO: 5.75 10*3/MM3 (ref 3.4–10.8)

## 2020-07-17 PROCEDURE — 85025 COMPLETE CBC W/AUTO DIFF WBC: CPT | Performed by: INTERNAL MEDICINE

## 2020-07-17 PROCEDURE — 36415 COLL VENOUS BLD VENIPUNCTURE: CPT

## 2020-07-17 PROCEDURE — 96372 THER/PROPH/DIAG INJ SC/IM: CPT

## 2020-07-17 PROCEDURE — 25010000002 EPOETIN ALFA PER 1000 UNITS: Performed by: NURSE PRACTITIONER

## 2020-07-17 RX ADMIN — ERYTHROPOIETIN 18000 UNITS: 20000 INJECTION, SOLUTION INTRAVENOUS; SUBCUTANEOUS at 10:53

## 2020-07-24 ENCOUNTER — INFUSION (OUTPATIENT)
Dept: ONCOLOGY | Facility: HOSPITAL | Age: 70
End: 2020-07-24

## 2020-07-24 ENCOUNTER — LAB (OUTPATIENT)
Dept: OTHER | Facility: HOSPITAL | Age: 70
End: 2020-07-24

## 2020-07-24 VITALS
WEIGHT: 224 LBS | RESPIRATION RATE: 16 BRPM | TEMPERATURE: 98.2 F | HEIGHT: 72 IN | DIASTOLIC BLOOD PRESSURE: 66 MMHG | BODY MASS INDEX: 30.34 KG/M2 | SYSTOLIC BLOOD PRESSURE: 131 MMHG | HEART RATE: 52 BPM | OXYGEN SATURATION: 98 %

## 2020-07-24 DIAGNOSIS — D63.1 ANEMIA OF CHRONIC KIDNEY FAILURE, STAGE 4 (SEVERE) (HCC): Primary | ICD-10-CM

## 2020-07-24 DIAGNOSIS — N18.4 ANEMIA OF CHRONIC KIDNEY FAILURE, STAGE 4 (SEVERE) (HCC): ICD-10-CM

## 2020-07-24 DIAGNOSIS — D63.1 ANEMIA OF CHRONIC KIDNEY FAILURE, STAGE 4 (SEVERE) (HCC): ICD-10-CM

## 2020-07-24 DIAGNOSIS — N18.4 ANEMIA OF CHRONIC KIDNEY FAILURE, STAGE 4 (SEVERE) (HCC): Primary | ICD-10-CM

## 2020-07-24 LAB
BASOPHILS # BLD AUTO: 0.02 10*3/MM3 (ref 0–0.2)
BASOPHILS NFR BLD AUTO: 0.4 % (ref 0–1.5)
DEPRECATED RDW RBC AUTO: 52.6 FL (ref 37–54)
EOSINOPHIL # BLD AUTO: 0.56 10*3/MM3 (ref 0–0.4)
EOSINOPHIL NFR BLD AUTO: 11.2 % (ref 0.3–6.2)
ERYTHROCYTE [DISTWIDTH] IN BLOOD BY AUTOMATED COUNT: 14.9 % (ref 12.3–15.4)
HCT VFR BLD AUTO: 27.5 % (ref 37.5–51)
HGB BLD-MCNC: 9.2 G/DL (ref 13–17.7)
IMM GRANULOCYTES # BLD AUTO: 0.03 10*3/MM3 (ref 0–0.05)
IMM GRANULOCYTES NFR BLD AUTO: 0.6 % (ref 0–0.5)
LYMPHOCYTES # BLD AUTO: 0.62 10*3/MM3 (ref 0.7–3.1)
LYMPHOCYTES NFR BLD AUTO: 12.4 % (ref 19.6–45.3)
MCH RBC QN AUTO: 32.5 PG (ref 26.6–33)
MCHC RBC AUTO-ENTMCNC: 33.5 G/DL (ref 31.5–35.7)
MCV RBC AUTO: 97.2 FL (ref 79–97)
MONOCYTES # BLD AUTO: 0.49 10*3/MM3 (ref 0.1–0.9)
MONOCYTES NFR BLD AUTO: 9.8 % (ref 5–12)
NEUTROPHILS NFR BLD AUTO: 3.27 10*3/MM3 (ref 1.7–7)
NEUTROPHILS NFR BLD AUTO: 65.6 % (ref 42.7–76)
NRBC BLD AUTO-RTO: 0 /100 WBC (ref 0–0.2)
PLATELET # BLD AUTO: 197 10*3/MM3 (ref 140–450)
PMV BLD AUTO: 10.9 FL (ref 6–12)
RBC # BLD AUTO: 2.83 10*6/MM3 (ref 4.14–5.8)
WBC # BLD AUTO: 4.99 10*3/MM3 (ref 3.4–10.8)

## 2020-07-24 PROCEDURE — 96372 THER/PROPH/DIAG INJ SC/IM: CPT

## 2020-07-24 PROCEDURE — 36415 COLL VENOUS BLD VENIPUNCTURE: CPT

## 2020-07-24 PROCEDURE — 25010000002 EPOETIN ALFA PER 1000 UNITS: Performed by: NURSE PRACTITIONER

## 2020-07-24 PROCEDURE — 85025 COMPLETE CBC W/AUTO DIFF WBC: CPT | Performed by: INTERNAL MEDICINE

## 2020-07-24 RX ADMIN — ERYTHROPOIETIN 22000 UNITS: 20000 INJECTION, SOLUTION INTRAVENOUS; SUBCUTANEOUS at 10:30

## 2020-07-24 NOTE — NURSING NOTE
Lab Results   Component Value Date    WBC 4.99 07/24/2020    HGB 9.2 (L) 07/24/2020    HCT 27.5 (L) 07/24/2020    MCV 97.2 (H) 07/24/2020     07/24/2020     Procrit increased per protocol to 32474 units.  Pt denies complaints and next appt reviewed.  Pt instructed to call sooner with concerns.

## 2020-07-31 ENCOUNTER — INFUSION (OUTPATIENT)
Dept: ONCOLOGY | Facility: HOSPITAL | Age: 70
End: 2020-07-31

## 2020-07-31 ENCOUNTER — LAB (OUTPATIENT)
Dept: OTHER | Facility: HOSPITAL | Age: 70
End: 2020-07-31

## 2020-07-31 VITALS
TEMPERATURE: 96.9 F | SYSTOLIC BLOOD PRESSURE: 160 MMHG | HEIGHT: 73 IN | BODY MASS INDEX: 29.55 KG/M2 | OXYGEN SATURATION: 100 % | DIASTOLIC BLOOD PRESSURE: 63 MMHG | HEART RATE: 47 BPM | WEIGHT: 223 LBS | RESPIRATION RATE: 18 BRPM

## 2020-07-31 DIAGNOSIS — D63.1 ANEMIA OF CHRONIC KIDNEY FAILURE, STAGE 4 (SEVERE) (HCC): ICD-10-CM

## 2020-07-31 DIAGNOSIS — N18.4 ANEMIA OF CHRONIC KIDNEY FAILURE, STAGE 4 (SEVERE) (HCC): ICD-10-CM

## 2020-07-31 LAB
BASOPHILS # BLD AUTO: 0.04 10*3/MM3 (ref 0–0.2)
BASOPHILS NFR BLD AUTO: 0.8 % (ref 0–1.5)
DEPRECATED RDW RBC AUTO: 53.1 FL (ref 37–54)
EOSINOPHIL # BLD AUTO: 0.66 10*3/MM3 (ref 0–0.4)
EOSINOPHIL NFR BLD AUTO: 13.8 % (ref 0.3–6.2)
ERYTHROCYTE [DISTWIDTH] IN BLOOD BY AUTOMATED COUNT: 15 % (ref 12.3–15.4)
HCT VFR BLD AUTO: 30.3 % (ref 37.5–51)
HGB BLD-MCNC: 10.1 G/DL (ref 13–17.7)
IMM GRANULOCYTES # BLD AUTO: 0.02 10*3/MM3 (ref 0–0.05)
IMM GRANULOCYTES NFR BLD AUTO: 0.4 % (ref 0–0.5)
LYMPHOCYTES # BLD AUTO: 0.61 10*3/MM3 (ref 0.7–3.1)
LYMPHOCYTES NFR BLD AUTO: 12.7 % (ref 19.6–45.3)
MCH RBC QN AUTO: 32.6 PG (ref 26.6–33)
MCHC RBC AUTO-ENTMCNC: 33.3 G/DL (ref 31.5–35.7)
MCV RBC AUTO: 97.7 FL (ref 79–97)
MONOCYTES # BLD AUTO: 0.55 10*3/MM3 (ref 0.1–0.9)
MONOCYTES NFR BLD AUTO: 11.5 % (ref 5–12)
NEUTROPHILS NFR BLD AUTO: 2.92 10*3/MM3 (ref 1.7–7)
NEUTROPHILS NFR BLD AUTO: 60.8 % (ref 42.7–76)
NRBC BLD AUTO-RTO: 0 /100 WBC (ref 0–0.2)
PLATELET # BLD AUTO: 205 10*3/MM3 (ref 140–450)
PMV BLD AUTO: 11.3 FL (ref 6–12)
RBC # BLD AUTO: 3.1 10*6/MM3 (ref 4.14–5.8)
WBC # BLD AUTO: 4.8 10*3/MM3 (ref 3.4–10.8)

## 2020-07-31 PROCEDURE — 36415 COLL VENOUS BLD VENIPUNCTURE: CPT

## 2020-07-31 PROCEDURE — G0463 HOSPITAL OUTPT CLINIC VISIT: HCPCS

## 2020-07-31 PROCEDURE — 85025 COMPLETE CBC W/AUTO DIFF WBC: CPT | Performed by: INTERNAL MEDICINE

## 2020-07-31 NOTE — NURSING NOTE
Hgb 10.1 today.  No procrit needed. Copy of labs given and pt v/u.  He is scheduled to return next week for CBC with possible procrit.  He is aware he will receive procrit if the hgb drops back below 10.0.  He will call with any questions or concerns.

## 2020-08-07 ENCOUNTER — LAB (OUTPATIENT)
Dept: OTHER | Facility: HOSPITAL | Age: 70
End: 2020-08-07

## 2020-08-07 ENCOUNTER — INFUSION (OUTPATIENT)
Dept: ONCOLOGY | Facility: HOSPITAL | Age: 70
End: 2020-08-07

## 2020-08-07 VITALS
SYSTOLIC BLOOD PRESSURE: 128 MMHG | OXYGEN SATURATION: 97 % | HEART RATE: 53 BPM | TEMPERATURE: 97.6 F | WEIGHT: 225.2 LBS | BODY MASS INDEX: 29.85 KG/M2 | HEIGHT: 73 IN | RESPIRATION RATE: 18 BRPM | DIASTOLIC BLOOD PRESSURE: 65 MMHG

## 2020-08-07 DIAGNOSIS — N18.4 ANEMIA OF CHRONIC KIDNEY FAILURE, STAGE 4 (SEVERE) (HCC): ICD-10-CM

## 2020-08-07 DIAGNOSIS — D63.1 ANEMIA OF CHRONIC KIDNEY FAILURE, STAGE 4 (SEVERE) (HCC): ICD-10-CM

## 2020-08-07 LAB
BASOPHILS # BLD AUTO: 0.03 10*3/MM3 (ref 0–0.2)
BASOPHILS NFR BLD AUTO: 0.6 % (ref 0–1.5)
DEPRECATED RDW RBC AUTO: 49.6 FL (ref 37–54)
EOSINOPHIL # BLD AUTO: 0.63 10*3/MM3 (ref 0–0.4)
EOSINOPHIL NFR BLD AUTO: 12.7 % (ref 0.3–6.2)
ERYTHROCYTE [DISTWIDTH] IN BLOOD BY AUTOMATED COUNT: 14 % (ref 12.3–15.4)
HCT VFR BLD AUTO: 30.7 % (ref 37.5–51)
HGB BLD-MCNC: 10.1 G/DL (ref 13–17.7)
IMM GRANULOCYTES # BLD AUTO: 0.01 10*3/MM3 (ref 0–0.05)
IMM GRANULOCYTES NFR BLD AUTO: 0.2 % (ref 0–0.5)
LYMPHOCYTES # BLD AUTO: 0.74 10*3/MM3 (ref 0.7–3.1)
LYMPHOCYTES NFR BLD AUTO: 14.9 % (ref 19.6–45.3)
MCH RBC QN AUTO: 31.7 PG (ref 26.6–33)
MCHC RBC AUTO-ENTMCNC: 32.9 G/DL (ref 31.5–35.7)
MCV RBC AUTO: 96.2 FL (ref 79–97)
MONOCYTES # BLD AUTO: 0.44 10*3/MM3 (ref 0.1–0.9)
MONOCYTES NFR BLD AUTO: 8.8 % (ref 5–12)
NEUTROPHILS NFR BLD AUTO: 3.13 10*3/MM3 (ref 1.7–7)
NEUTROPHILS NFR BLD AUTO: 62.8 % (ref 42.7–76)
NRBC BLD AUTO-RTO: 0 /100 WBC (ref 0–0.2)
PLATELET # BLD AUTO: 197 10*3/MM3 (ref 140–450)
PMV BLD AUTO: 11.7 FL (ref 6–12)
RBC # BLD AUTO: 3.19 10*6/MM3 (ref 4.14–5.8)
WBC # BLD AUTO: 4.98 10*3/MM3 (ref 3.4–10.8)

## 2020-08-07 PROCEDURE — 36415 COLL VENOUS BLD VENIPUNCTURE: CPT

## 2020-08-07 PROCEDURE — 85025 COMPLETE CBC W/AUTO DIFF WBC: CPT | Performed by: INTERNAL MEDICINE

## 2020-08-07 NOTE — NURSING NOTE
Lab Results   Component Value Date    WBC 4.98 08/07/2020    HGB 10.1 (L) 08/07/2020    HCT 30.7 (L) 08/07/2020    MCV 96.2 08/07/2020     08/07/2020     Procrit not indicated for Hgb 10.1.  Pt denies complaints.  Next appt reviewed and pt knows to call sooner with concerns.

## 2020-08-14 ENCOUNTER — INFUSION (OUTPATIENT)
Dept: ONCOLOGY | Facility: HOSPITAL | Age: 70
End: 2020-08-14

## 2020-08-14 ENCOUNTER — LAB (OUTPATIENT)
Dept: OTHER | Facility: HOSPITAL | Age: 70
End: 2020-08-14

## 2020-08-14 VITALS
BODY MASS INDEX: 29.63 KG/M2 | HEART RATE: 52 BPM | SYSTOLIC BLOOD PRESSURE: 138 MMHG | WEIGHT: 223.6 LBS | DIASTOLIC BLOOD PRESSURE: 61 MMHG | OXYGEN SATURATION: 98 % | HEIGHT: 73 IN | TEMPERATURE: 97.5 F | RESPIRATION RATE: 18 BRPM

## 2020-08-14 DIAGNOSIS — D63.1 ANEMIA OF CHRONIC KIDNEY FAILURE, STAGE 4 (SEVERE) (HCC): ICD-10-CM

## 2020-08-14 DIAGNOSIS — N18.4 ANEMIA OF CHRONIC KIDNEY FAILURE, STAGE 4 (SEVERE) (HCC): ICD-10-CM

## 2020-08-14 LAB
BASOPHILS # BLD AUTO: 0.05 10*3/MM3 (ref 0–0.2)
BASOPHILS NFR BLD AUTO: 0.9 % (ref 0–1.5)
DEPRECATED RDW RBC AUTO: 47.8 FL (ref 37–54)
EOSINOPHIL # BLD AUTO: 0.69 10*3/MM3 (ref 0–0.4)
EOSINOPHIL NFR BLD AUTO: 13 % (ref 0.3–6.2)
ERYTHROCYTE [DISTWIDTH] IN BLOOD BY AUTOMATED COUNT: 13.7 % (ref 12.3–15.4)
HCT VFR BLD AUTO: 30.1 % (ref 37.5–51)
HGB BLD-MCNC: 10.1 G/DL (ref 13–17.7)
IMM GRANULOCYTES # BLD AUTO: 0.02 10*3/MM3 (ref 0–0.05)
IMM GRANULOCYTES NFR BLD AUTO: 0.4 % (ref 0–0.5)
LYMPHOCYTES # BLD AUTO: 0.74 10*3/MM3 (ref 0.7–3.1)
LYMPHOCYTES NFR BLD AUTO: 14 % (ref 19.6–45.3)
MCH RBC QN AUTO: 31.8 PG (ref 26.6–33)
MCHC RBC AUTO-ENTMCNC: 33.6 G/DL (ref 31.5–35.7)
MCV RBC AUTO: 94.7 FL (ref 79–97)
MONOCYTES # BLD AUTO: 0.47 10*3/MM3 (ref 0.1–0.9)
MONOCYTES NFR BLD AUTO: 8.9 % (ref 5–12)
NEUTROPHILS NFR BLD AUTO: 3.32 10*3/MM3 (ref 1.7–7)
NEUTROPHILS NFR BLD AUTO: 62.8 % (ref 42.7–76)
NRBC BLD AUTO-RTO: 0 /100 WBC (ref 0–0.2)
PLATELET # BLD AUTO: 200 10*3/MM3 (ref 140–450)
PMV BLD AUTO: 11.9 FL (ref 6–12)
RBC # BLD AUTO: 3.18 10*6/MM3 (ref 4.14–5.8)
WBC # BLD AUTO: 5.29 10*3/MM3 (ref 3.4–10.8)

## 2020-08-14 PROCEDURE — 85025 COMPLETE CBC W/AUTO DIFF WBC: CPT | Performed by: INTERNAL MEDICINE

## 2020-08-14 PROCEDURE — G0463 HOSPITAL OUTPT CLINIC VISIT: HCPCS

## 2020-08-14 PROCEDURE — 36415 COLL VENOUS BLD VENIPUNCTURE: CPT

## 2020-08-14 NOTE — NURSING NOTE
Lab Results   Component Value Date    WBC 5.29 08/14/2020    HGB 10.1 (L) 08/14/2020    HCT 30.1 (L) 08/14/2020    MCV 94.7 08/14/2020     08/14/2020     Procrit not indicated for Hgb 10.1.  Pt denies complaints.  Next appt reviewed and pt knows to call sooner with concerns

## 2020-08-21 ENCOUNTER — LAB (OUTPATIENT)
Dept: OTHER | Facility: HOSPITAL | Age: 70
End: 2020-08-21

## 2020-08-21 ENCOUNTER — INFUSION (OUTPATIENT)
Dept: ONCOLOGY | Facility: HOSPITAL | Age: 70
End: 2020-08-21

## 2020-08-21 VITALS
DIASTOLIC BLOOD PRESSURE: 73 MMHG | OXYGEN SATURATION: 98 % | RESPIRATION RATE: 18 BRPM | WEIGHT: 226 LBS | HEART RATE: 50 BPM | TEMPERATURE: 97.3 F | SYSTOLIC BLOOD PRESSURE: 132 MMHG | HEIGHT: 73 IN | BODY MASS INDEX: 29.95 KG/M2

## 2020-08-21 DIAGNOSIS — D63.1 ANEMIA OF CHRONIC KIDNEY FAILURE, STAGE 4 (SEVERE) (HCC): Primary | ICD-10-CM

## 2020-08-21 DIAGNOSIS — N18.4 ANEMIA OF CHRONIC KIDNEY FAILURE, STAGE 4 (SEVERE) (HCC): Primary | ICD-10-CM

## 2020-08-21 DIAGNOSIS — N18.4 ANEMIA OF CHRONIC KIDNEY FAILURE, STAGE 4 (SEVERE) (HCC): ICD-10-CM

## 2020-08-21 DIAGNOSIS — D63.1 ANEMIA OF CHRONIC KIDNEY FAILURE, STAGE 4 (SEVERE) (HCC): ICD-10-CM

## 2020-08-21 LAB
BASOPHILS # BLD AUTO: 0.03 10*3/MM3 (ref 0–0.2)
BASOPHILS NFR BLD AUTO: 0.5 % (ref 0–1.5)
DEPRECATED RDW RBC AUTO: 46.5 FL (ref 37–54)
EOSINOPHIL # BLD AUTO: 0.83 10*3/MM3 (ref 0–0.4)
EOSINOPHIL NFR BLD AUTO: 15 % (ref 0.3–6.2)
ERYTHROCYTE [DISTWIDTH] IN BLOOD BY AUTOMATED COUNT: 13.5 % (ref 12.3–15.4)
HCT VFR BLD AUTO: 28.2 % (ref 37.5–51)
HGB BLD-MCNC: 9.5 G/DL (ref 13–17.7)
IMM GRANULOCYTES # BLD AUTO: 0.04 10*3/MM3 (ref 0–0.05)
IMM GRANULOCYTES NFR BLD AUTO: 0.7 % (ref 0–0.5)
LYMPHOCYTES # BLD AUTO: 1 10*3/MM3 (ref 0.7–3.1)
LYMPHOCYTES NFR BLD AUTO: 18.1 % (ref 19.6–45.3)
MCH RBC QN AUTO: 31.8 PG (ref 26.6–33)
MCHC RBC AUTO-ENTMCNC: 33.7 G/DL (ref 31.5–35.7)
MCV RBC AUTO: 94.3 FL (ref 79–97)
MONOCYTES # BLD AUTO: 0.46 10*3/MM3 (ref 0.1–0.9)
MONOCYTES NFR BLD AUTO: 8.3 % (ref 5–12)
NEUTROPHILS NFR BLD AUTO: 3.16 10*3/MM3 (ref 1.7–7)
NEUTROPHILS NFR BLD AUTO: 57.4 % (ref 42.7–76)
NRBC BLD AUTO-RTO: 0 /100 WBC (ref 0–0.2)
PLATELET # BLD AUTO: 200 10*3/MM3 (ref 140–450)
PMV BLD AUTO: 11.8 FL (ref 6–12)
RBC # BLD AUTO: 2.99 10*6/MM3 (ref 4.14–5.8)
WBC # BLD AUTO: 5.52 10*3/MM3 (ref 3.4–10.8)

## 2020-08-21 PROCEDURE — 85025 COMPLETE CBC W/AUTO DIFF WBC: CPT | Performed by: INTERNAL MEDICINE

## 2020-08-21 PROCEDURE — 96372 THER/PROPH/DIAG INJ SC/IM: CPT

## 2020-08-21 PROCEDURE — 36415 COLL VENOUS BLD VENIPUNCTURE: CPT

## 2020-08-21 PROCEDURE — 25010000002 EPOETIN ALFA PER 1000 UNITS: Performed by: NURSE PRACTITIONER

## 2020-08-21 RX ADMIN — ERYTHROPOIETIN 16000 UNITS: 20000 INJECTION, SOLUTION INTRAVENOUS; SUBCUTANEOUS at 10:30

## 2020-08-28 ENCOUNTER — LAB (OUTPATIENT)
Dept: OTHER | Facility: HOSPITAL | Age: 70
End: 2020-08-28

## 2020-08-28 ENCOUNTER — INFUSION (OUTPATIENT)
Dept: ONCOLOGY | Facility: HOSPITAL | Age: 70
End: 2020-08-28

## 2020-08-28 VITALS
OXYGEN SATURATION: 98 % | HEIGHT: 73 IN | SYSTOLIC BLOOD PRESSURE: 152 MMHG | BODY MASS INDEX: 29.87 KG/M2 | DIASTOLIC BLOOD PRESSURE: 65 MMHG | RESPIRATION RATE: 18 BRPM | WEIGHT: 225.4 LBS | TEMPERATURE: 97.4 F | HEART RATE: 52 BPM

## 2020-08-28 DIAGNOSIS — D63.1 ANEMIA OF CHRONIC KIDNEY FAILURE, STAGE 4 (SEVERE) (HCC): ICD-10-CM

## 2020-08-28 DIAGNOSIS — N18.4 ANEMIA OF CHRONIC KIDNEY FAILURE, STAGE 4 (SEVERE) (HCC): ICD-10-CM

## 2020-08-28 DIAGNOSIS — N18.4 ANEMIA OF CHRONIC KIDNEY FAILURE, STAGE 4 (SEVERE) (HCC): Primary | ICD-10-CM

## 2020-08-28 DIAGNOSIS — D63.1 ANEMIA OF CHRONIC KIDNEY FAILURE, STAGE 4 (SEVERE) (HCC): Primary | ICD-10-CM

## 2020-08-28 LAB
BASOPHILS # BLD AUTO: 0.04 10*3/MM3 (ref 0–0.2)
BASOPHILS NFR BLD AUTO: 0.7 % (ref 0–1.5)
DEPRECATED RDW RBC AUTO: 46.6 FL (ref 37–54)
EOSINOPHIL # BLD AUTO: 0.79 10*3/MM3 (ref 0–0.4)
EOSINOPHIL NFR BLD AUTO: 13.1 % (ref 0.3–6.2)
ERYTHROCYTE [DISTWIDTH] IN BLOOD BY AUTOMATED COUNT: 13.8 % (ref 12.3–15.4)
HCT VFR BLD AUTO: 28.2 % (ref 37.5–51)
HGB BLD-MCNC: 9.5 G/DL (ref 13–17.7)
IMM GRANULOCYTES # BLD AUTO: 0.04 10*3/MM3 (ref 0–0.05)
IMM GRANULOCYTES NFR BLD AUTO: 0.7 % (ref 0–0.5)
LYMPHOCYTES # BLD AUTO: 0.8 10*3/MM3 (ref 0.7–3.1)
LYMPHOCYTES NFR BLD AUTO: 13.2 % (ref 19.6–45.3)
MCH RBC QN AUTO: 31.7 PG (ref 26.6–33)
MCHC RBC AUTO-ENTMCNC: 33.7 G/DL (ref 31.5–35.7)
MCV RBC AUTO: 94 FL (ref 79–97)
MONOCYTES # BLD AUTO: 0.55 10*3/MM3 (ref 0.1–0.9)
MONOCYTES NFR BLD AUTO: 9.1 % (ref 5–12)
NEUTROPHILS NFR BLD AUTO: 3.83 10*3/MM3 (ref 1.7–7)
NEUTROPHILS NFR BLD AUTO: 63.2 % (ref 42.7–76)
NRBC BLD AUTO-RTO: 0 /100 WBC (ref 0–0.2)
PLATELET # BLD AUTO: 218 10*3/MM3 (ref 140–450)
PMV BLD AUTO: 11.2 FL (ref 6–12)
RBC # BLD AUTO: 3 10*6/MM3 (ref 4.14–5.8)
WBC # BLD AUTO: 6.05 10*3/MM3 (ref 3.4–10.8)

## 2020-08-28 PROCEDURE — 25010000002 EPOETIN ALFA PER 1000 UNITS: Performed by: NURSE PRACTITIONER

## 2020-08-28 PROCEDURE — 85025 COMPLETE CBC W/AUTO DIFF WBC: CPT | Performed by: INTERNAL MEDICINE

## 2020-08-28 PROCEDURE — 96372 THER/PROPH/DIAG INJ SC/IM: CPT

## 2020-08-28 PROCEDURE — 36415 COLL VENOUS BLD VENIPUNCTURE: CPT

## 2020-08-28 RX ADMIN — ERYTHROPOIETIN 16000 UNITS: 20000 INJECTION, SOLUTION INTRAVENOUS; SUBCUTANEOUS at 10:28

## 2020-09-04 ENCOUNTER — INFUSION (OUTPATIENT)
Dept: ONCOLOGY | Facility: HOSPITAL | Age: 70
End: 2020-09-04

## 2020-09-04 ENCOUNTER — LAB (OUTPATIENT)
Dept: OTHER | Facility: HOSPITAL | Age: 70
End: 2020-09-04

## 2020-09-04 VITALS
DIASTOLIC BLOOD PRESSURE: 66 MMHG | BODY MASS INDEX: 30.22 KG/M2 | RESPIRATION RATE: 18 BRPM | HEIGHT: 73 IN | SYSTOLIC BLOOD PRESSURE: 131 MMHG | WEIGHT: 228 LBS | OXYGEN SATURATION: 99 % | TEMPERATURE: 97.1 F | HEART RATE: 56 BPM

## 2020-09-04 DIAGNOSIS — D63.1 ANEMIA OF CHRONIC KIDNEY FAILURE, STAGE 4 (SEVERE) (HCC): Primary | ICD-10-CM

## 2020-09-04 DIAGNOSIS — D63.1 ANEMIA OF CHRONIC KIDNEY FAILURE, STAGE 4 (SEVERE) (HCC): ICD-10-CM

## 2020-09-04 DIAGNOSIS — N18.4 ANEMIA OF CHRONIC KIDNEY FAILURE, STAGE 4 (SEVERE) (HCC): Primary | ICD-10-CM

## 2020-09-04 DIAGNOSIS — N18.4 ANEMIA OF CHRONIC KIDNEY FAILURE, STAGE 4 (SEVERE) (HCC): ICD-10-CM

## 2020-09-04 LAB
BASOPHILS # BLD AUTO: 0.03 10*3/MM3 (ref 0–0.2)
BASOPHILS NFR BLD AUTO: 0.5 % (ref 0–1.5)
DEPRECATED RDW RBC AUTO: 51.7 FL (ref 37–54)
EOSINOPHIL # BLD AUTO: 0.45 10*3/MM3 (ref 0–0.4)
EOSINOPHIL NFR BLD AUTO: 7.3 % (ref 0.3–6.2)
ERYTHROCYTE [DISTWIDTH] IN BLOOD BY AUTOMATED COUNT: 15.1 % (ref 12.3–15.4)
HCT VFR BLD AUTO: 29.4 % (ref 37.5–51)
HGB BLD-MCNC: 9.8 G/DL (ref 13–17.7)
IMM GRANULOCYTES # BLD AUTO: 0.04 10*3/MM3 (ref 0–0.05)
IMM GRANULOCYTES NFR BLD AUTO: 0.7 % (ref 0–0.5)
LYMPHOCYTES # BLD AUTO: 0.66 10*3/MM3 (ref 0.7–3.1)
LYMPHOCYTES NFR BLD AUTO: 10.7 % (ref 19.6–45.3)
MCH RBC QN AUTO: 31.7 PG (ref 26.6–33)
MCHC RBC AUTO-ENTMCNC: 33.3 G/DL (ref 31.5–35.7)
MCV RBC AUTO: 95.1 FL (ref 79–97)
MONOCYTES # BLD AUTO: 0.6 10*3/MM3 (ref 0.1–0.9)
MONOCYTES NFR BLD AUTO: 9.8 % (ref 5–12)
NEUTROPHILS NFR BLD AUTO: 4.37 10*3/MM3 (ref 1.7–7)
NEUTROPHILS NFR BLD AUTO: 71 % (ref 42.7–76)
NRBC BLD AUTO-RTO: 0 /100 WBC (ref 0–0.2)
PLATELET # BLD AUTO: 194 10*3/MM3 (ref 140–450)
PMV BLD AUTO: 11.3 FL (ref 6–12)
RBC # BLD AUTO: 3.09 10*6/MM3 (ref 4.14–5.8)
WBC # BLD AUTO: 6.15 10*3/MM3 (ref 3.4–10.8)

## 2020-09-04 PROCEDURE — 25010000002 EPOETIN ALFA PER 1000 UNITS: Performed by: NURSE PRACTITIONER

## 2020-09-04 PROCEDURE — 96372 THER/PROPH/DIAG INJ SC/IM: CPT

## 2020-09-04 PROCEDURE — 85025 COMPLETE CBC W/AUTO DIFF WBC: CPT | Performed by: INTERNAL MEDICINE

## 2020-09-04 PROCEDURE — 36415 COLL VENOUS BLD VENIPUNCTURE: CPT

## 2020-09-04 RX ADMIN — ERYTHROPOIETIN 16000 UNITS: 20000 INJECTION, SOLUTION INTRAVENOUS; SUBCUTANEOUS at 10:39

## 2020-09-04 NOTE — NURSING NOTE
Procrit given per protocol.  Pt denies complaints.  Next appt reviewed and pt knows to call sooner with concerns.

## 2020-09-11 ENCOUNTER — LAB (OUTPATIENT)
Dept: OTHER | Facility: HOSPITAL | Age: 70
End: 2020-09-11

## 2020-09-11 ENCOUNTER — INFUSION (OUTPATIENT)
Dept: ONCOLOGY | Facility: HOSPITAL | Age: 70
End: 2020-09-11

## 2020-09-11 DIAGNOSIS — D63.1 ANEMIA OF CHRONIC KIDNEY FAILURE, STAGE 4 (SEVERE) (HCC): ICD-10-CM

## 2020-09-11 DIAGNOSIS — N18.4 ANEMIA OF CHRONIC KIDNEY FAILURE, STAGE 4 (SEVERE) (HCC): ICD-10-CM

## 2020-09-11 LAB
BASOPHILS # BLD AUTO: 0.03 10*3/MM3 (ref 0–0.2)
BASOPHILS NFR BLD AUTO: 0.5 % (ref 0–1.5)
DEPRECATED RDW RBC AUTO: 51.3 FL (ref 37–54)
EOSINOPHIL # BLD AUTO: 0.71 10*3/MM3 (ref 0–0.4)
EOSINOPHIL NFR BLD AUTO: 12.2 % (ref 0.3–6.2)
ERYTHROCYTE [DISTWIDTH] IN BLOOD BY AUTOMATED COUNT: 14.8 % (ref 12.3–15.4)
HCT VFR BLD AUTO: 31.3 % (ref 37.5–51)
HGB BLD-MCNC: 10.4 G/DL (ref 13–17.7)
IMM GRANULOCYTES # BLD AUTO: 0.03 10*3/MM3 (ref 0–0.05)
IMM GRANULOCYTES NFR BLD AUTO: 0.5 % (ref 0–0.5)
LYMPHOCYTES # BLD AUTO: 0.65 10*3/MM3 (ref 0.7–3.1)
LYMPHOCYTES NFR BLD AUTO: 11.2 % (ref 19.6–45.3)
MCH RBC QN AUTO: 31.7 PG (ref 26.6–33)
MCHC RBC AUTO-ENTMCNC: 33.2 G/DL (ref 31.5–35.7)
MCV RBC AUTO: 95.4 FL (ref 79–97)
MONOCYTES # BLD AUTO: 0.52 10*3/MM3 (ref 0.1–0.9)
MONOCYTES NFR BLD AUTO: 9 % (ref 5–12)
NEUTROPHILS NFR BLD AUTO: 3.87 10*3/MM3 (ref 1.7–7)
NEUTROPHILS NFR BLD AUTO: 66.6 % (ref 42.7–76)
NRBC BLD AUTO-RTO: 0 /100 WBC (ref 0–0.2)
PLATELET # BLD AUTO: 211 10*3/MM3 (ref 140–450)
PMV BLD AUTO: 11.7 FL (ref 6–12)
RBC # BLD AUTO: 3.28 10*6/MM3 (ref 4.14–5.8)
WBC # BLD AUTO: 5.81 10*3/MM3 (ref 3.4–10.8)

## 2020-09-11 PROCEDURE — 85025 COMPLETE CBC W/AUTO DIFF WBC: CPT | Performed by: INTERNAL MEDICINE

## 2020-09-11 PROCEDURE — G0463 HOSPITAL OUTPT CLINIC VISIT: HCPCS

## 2020-09-11 PROCEDURE — 36415 COLL VENOUS BLD VENIPUNCTURE: CPT

## 2020-09-11 NOTE — NURSING NOTE
Lab Results   Component Value Date    WBC 5.81 09/11/2020    HGB 10.4 (L) 09/11/2020    HCT 31.3 (L) 09/11/2020    MCV 95.4 09/11/2020     09/11/2020   no Procrit needed today per MD order

## 2020-09-18 ENCOUNTER — INFUSION (OUTPATIENT)
Dept: ONCOLOGY | Facility: HOSPITAL | Age: 70
End: 2020-09-18

## 2020-09-18 ENCOUNTER — LAB (OUTPATIENT)
Dept: OTHER | Facility: HOSPITAL | Age: 70
End: 2020-09-18

## 2020-09-18 VITALS
BODY MASS INDEX: 29.82 KG/M2 | WEIGHT: 225 LBS | SYSTOLIC BLOOD PRESSURE: 144 MMHG | TEMPERATURE: 97.3 F | HEART RATE: 50 BPM | HEIGHT: 73 IN | OXYGEN SATURATION: 99 % | DIASTOLIC BLOOD PRESSURE: 64 MMHG | RESPIRATION RATE: 18 BRPM

## 2020-09-18 DIAGNOSIS — N18.4 ANEMIA OF CHRONIC KIDNEY FAILURE, STAGE 4 (SEVERE) (HCC): ICD-10-CM

## 2020-09-18 DIAGNOSIS — D63.1 ANEMIA OF CHRONIC KIDNEY FAILURE, STAGE 4 (SEVERE) (HCC): ICD-10-CM

## 2020-09-18 LAB
BASOPHILS # BLD AUTO: 0.02 10*3/MM3 (ref 0–0.2)
BASOPHILS NFR BLD AUTO: 0.5 % (ref 0–1.5)
DEPRECATED RDW RBC AUTO: 51.8 FL (ref 37–54)
EOSINOPHIL # BLD AUTO: 0.62 10*3/MM3 (ref 0–0.4)
EOSINOPHIL NFR BLD AUTO: 14.1 % (ref 0.3–6.2)
ERYTHROCYTE [DISTWIDTH] IN BLOOD BY AUTOMATED COUNT: 14.7 % (ref 12.3–15.4)
HCT VFR BLD AUTO: 31.3 % (ref 37.5–51)
HGB BLD-MCNC: 10.6 G/DL (ref 13–17.7)
IMM GRANULOCYTES # BLD AUTO: 0.02 10*3/MM3 (ref 0–0.05)
IMM GRANULOCYTES NFR BLD AUTO: 0.5 % (ref 0–0.5)
LYMPHOCYTES # BLD AUTO: 0.62 10*3/MM3 (ref 0.7–3.1)
LYMPHOCYTES NFR BLD AUTO: 14.1 % (ref 19.6–45.3)
MCH RBC QN AUTO: 32 PG (ref 26.6–33)
MCHC RBC AUTO-ENTMCNC: 33.9 G/DL (ref 31.5–35.7)
MCV RBC AUTO: 94.6 FL (ref 79–97)
MONOCYTES # BLD AUTO: 0.45 10*3/MM3 (ref 0.1–0.9)
MONOCYTES NFR BLD AUTO: 10.2 % (ref 5–12)
NEUTROPHILS NFR BLD AUTO: 2.67 10*3/MM3 (ref 1.7–7)
NEUTROPHILS NFR BLD AUTO: 60.6 % (ref 42.7–76)
NRBC BLD AUTO-RTO: 0 /100 WBC (ref 0–0.2)
PLATELET # BLD AUTO: 197 10*3/MM3 (ref 140–450)
PMV BLD AUTO: 10.7 FL (ref 6–12)
RBC # BLD AUTO: 3.31 10*6/MM3 (ref 4.14–5.8)
WBC # BLD AUTO: 4.4 10*3/MM3 (ref 3.4–10.8)

## 2020-09-18 PROCEDURE — 36415 COLL VENOUS BLD VENIPUNCTURE: CPT

## 2020-09-18 PROCEDURE — 85025 COMPLETE CBC W/AUTO DIFF WBC: CPT | Performed by: INTERNAL MEDICINE

## 2020-09-18 NOTE — NURSING NOTE
Lab Results   Component Value Date    WBC 4.40 09/18/2020    HGB 10.6 (L) 09/18/2020    HCT 31.3 (L) 09/18/2020    MCV 94.6 09/18/2020     09/18/2020     Procrit not indicated for Hgb 10.6.  Pt denies complaints. Next appt reviewed and pt knows to call sooner with concerns.

## 2020-09-25 ENCOUNTER — LAB (OUTPATIENT)
Dept: OTHER | Facility: HOSPITAL | Age: 70
End: 2020-09-25

## 2020-09-25 ENCOUNTER — INFUSION (OUTPATIENT)
Dept: ONCOLOGY | Facility: HOSPITAL | Age: 70
End: 2020-09-25

## 2020-09-25 DIAGNOSIS — D63.1 ANEMIA OF CHRONIC KIDNEY FAILURE, STAGE 4 (SEVERE) (HCC): ICD-10-CM

## 2020-09-25 DIAGNOSIS — N18.4 ANEMIA OF CHRONIC KIDNEY FAILURE, STAGE 4 (SEVERE) (HCC): ICD-10-CM

## 2020-09-25 LAB
BASOPHILS # BLD AUTO: 0.04 10*3/MM3 (ref 0–0.2)
BASOPHILS NFR BLD AUTO: 0.8 % (ref 0–1.5)
DEPRECATED RDW RBC AUTO: 49.1 FL (ref 37–54)
EOSINOPHIL # BLD AUTO: 0.78 10*3/MM3 (ref 0–0.4)
EOSINOPHIL NFR BLD AUTO: 14.9 % (ref 0.3–6.2)
ERYTHROCYTE [DISTWIDTH] IN BLOOD BY AUTOMATED COUNT: 14.3 % (ref 12.3–15.4)
HCT VFR BLD AUTO: 31.7 % (ref 37.5–51)
HGB BLD-MCNC: 10.7 G/DL (ref 13–17.7)
IMM GRANULOCYTES # BLD AUTO: 0.04 10*3/MM3 (ref 0–0.05)
IMM GRANULOCYTES NFR BLD AUTO: 0.8 % (ref 0–0.5)
LYMPHOCYTES # BLD AUTO: 0.84 10*3/MM3 (ref 0.7–3.1)
LYMPHOCYTES NFR BLD AUTO: 16 % (ref 19.6–45.3)
MCH RBC QN AUTO: 31.9 PG (ref 26.6–33)
MCHC RBC AUTO-ENTMCNC: 33.8 G/DL (ref 31.5–35.7)
MCV RBC AUTO: 94.6 FL (ref 79–97)
MONOCYTES # BLD AUTO: 0.47 10*3/MM3 (ref 0.1–0.9)
MONOCYTES NFR BLD AUTO: 9 % (ref 5–12)
NEUTROPHILS NFR BLD AUTO: 3.08 10*3/MM3 (ref 1.7–7)
NEUTROPHILS NFR BLD AUTO: 58.5 % (ref 42.7–76)
NRBC BLD AUTO-RTO: 0 /100 WBC (ref 0–0.2)
PLATELET # BLD AUTO: 188 10*3/MM3 (ref 140–450)
PMV BLD AUTO: 11.9 FL (ref 6–12)
RBC # BLD AUTO: 3.35 10*6/MM3 (ref 4.14–5.8)
WBC # BLD AUTO: 5.25 10*3/MM3 (ref 3.4–10.8)

## 2020-09-25 PROCEDURE — 36415 COLL VENOUS BLD VENIPUNCTURE: CPT

## 2020-09-25 PROCEDURE — 85025 COMPLETE CBC W/AUTO DIFF WBC: CPT | Performed by: INTERNAL MEDICINE

## 2020-09-25 NOTE — NURSING NOTE
Lab Results   Component Value Date    WBC 5.25 09/25/2020    HGB 10.7 (L) 09/25/2020    HCT 31.7 (L) 09/25/2020    MCV 94.6 09/25/2020     09/25/2020   Procrit held per doctor order. No voiced complaints and will return next week for gianni and MD moreno.

## 2020-10-02 ENCOUNTER — OFFICE VISIT (OUTPATIENT)
Dept: ONCOLOGY | Facility: CLINIC | Age: 70
End: 2020-10-02

## 2020-10-02 ENCOUNTER — LAB (OUTPATIENT)
Dept: OTHER | Facility: HOSPITAL | Age: 70
End: 2020-10-02

## 2020-10-02 ENCOUNTER — APPOINTMENT (OUTPATIENT)
Dept: ONCOLOGY | Facility: HOSPITAL | Age: 70
End: 2020-10-02

## 2020-10-02 VITALS
DIASTOLIC BLOOD PRESSURE: 62 MMHG | RESPIRATION RATE: 16 BRPM | HEART RATE: 55 BPM | WEIGHT: 224.3 LBS | HEIGHT: 73 IN | BODY MASS INDEX: 29.73 KG/M2 | SYSTOLIC BLOOD PRESSURE: 131 MMHG | OXYGEN SATURATION: 97 % | TEMPERATURE: 97.3 F

## 2020-10-02 DIAGNOSIS — D63.1 ANEMIA OF CHRONIC KIDNEY FAILURE, STAGE 4 (SEVERE) (HCC): ICD-10-CM

## 2020-10-02 DIAGNOSIS — D63.1 ANEMIA OF CHRONIC KIDNEY FAILURE, STAGE 4 (SEVERE) (HCC): Primary | ICD-10-CM

## 2020-10-02 DIAGNOSIS — N18.4 ANEMIA OF CHRONIC KIDNEY FAILURE, STAGE 4 (SEVERE) (HCC): Primary | ICD-10-CM

## 2020-10-02 DIAGNOSIS — N18.4 ANEMIA OF CHRONIC KIDNEY FAILURE, STAGE 4 (SEVERE) (HCC): ICD-10-CM

## 2020-10-02 LAB
ALBUMIN SERPL-MCNC: 3.4 G/DL (ref 3.5–5.2)
ALBUMIN/GLOB SERPL: 1.3 G/DL
ALP SERPL-CCNC: 101 U/L (ref 39–117)
ALT SERPL W P-5'-P-CCNC: 23 U/L (ref 1–41)
ANION GAP SERPL CALCULATED.3IONS-SCNC: 9.1 MMOL/L (ref 5–15)
AST SERPL-CCNC: 20 U/L (ref 1–40)
BASOPHILS # BLD AUTO: 0.03 10*3/MM3 (ref 0–0.2)
BASOPHILS NFR BLD AUTO: 0.7 % (ref 0–1.5)
BILIRUB SERPL-MCNC: 0.3 MG/DL (ref 0–1.2)
BUN SERPL-MCNC: 93 MG/DL (ref 8–23)
BUN/CREAT SERPL: 17.7 (ref 7–25)
C3 FRG RBC-MCNC: NORMAL
CALCIUM SPEC-SCNC: 9.2 MG/DL (ref 8.6–10.5)
CHLORIDE SERPL-SCNC: 104 MMOL/L (ref 98–107)
CO2 SERPL-SCNC: 23.9 MMOL/L (ref 22–29)
CREAT SERPL-MCNC: 5.24 MG/DL (ref 0.76–1.27)
DEPRECATED RDW RBC AUTO: 48.2 FL (ref 37–54)
EOSINOPHIL # BLD AUTO: 0.82 10*3/MM3 (ref 0–0.4)
EOSINOPHIL NFR BLD AUTO: 18.3 % (ref 0.3–6.2)
ERYTHROCYTE [DISTWIDTH] IN BLOOD BY AUTOMATED COUNT: 13.8 % (ref 12.3–15.4)
FERRITIN SERPL-MCNC: 136.5 NG/ML (ref 30–400)
GFR SERPL CREATININE-BSD FRML MDRD: 11 ML/MIN/1.73
GFR SERPL CREATININE-BSD FRML MDRD: ABNORMAL ML/MIN/{1.73_M2}
GLOBULIN UR ELPH-MCNC: 2.7 GM/DL
GLUCOSE SERPL-MCNC: 200 MG/DL (ref 65–99)
HCT VFR BLD AUTO: 30.9 % (ref 37.5–51)
HGB BLD-MCNC: 10.2 G/DL (ref 13–17.7)
HGB RETIC QN AUTO: 34.2 PG (ref 29.8–36.1)
IMM GRANULOCYTES # BLD AUTO: 0.01 10*3/MM3 (ref 0–0.05)
IMM GRANULOCYTES NFR BLD AUTO: 0.2 % (ref 0–0.5)
IMM RETICS NFR: 3.9 % (ref 3–15.8)
IRON 24H UR-MRATE: 146 MCG/DL (ref 59–158)
IRON SATN MFR SERPL: 46 % (ref 20–50)
LYMPHOCYTES # BLD AUTO: 0.82 10*3/MM3 (ref 0.7–3.1)
LYMPHOCYTES NFR BLD AUTO: 18.3 % (ref 19.6–45.3)
MCH RBC QN AUTO: 31.5 PG (ref 26.6–33)
MCHC RBC AUTO-ENTMCNC: 33 G/DL (ref 31.5–35.7)
MCV RBC AUTO: 95.4 FL (ref 79–97)
MONOCYTES # BLD AUTO: 0.42 10*3/MM3 (ref 0.1–0.9)
MONOCYTES NFR BLD AUTO: 9.4 % (ref 5–12)
NEUTROPHILS NFR BLD AUTO: 2.38 10*3/MM3 (ref 1.7–7)
NEUTROPHILS NFR BLD AUTO: 53.1 % (ref 42.7–76)
NRBC BLD AUTO-RTO: 0 /100 WBC (ref 0–0.2)
PHOSPHATE SERPL-MCNC: 5.9 MG/DL (ref 2.5–4.5)
PLAT MORPH BLD: NORMAL
PLATELET # BLD AUTO: 200 10*3/MM3 (ref 140–450)
PMV BLD AUTO: 11.4 FL (ref 6–12)
POTASSIUM SERPL-SCNC: 4.8 MMOL/L (ref 3.5–5.2)
PROT SERPL-MCNC: 6.1 G/DL (ref 6–8.5)
RBC # BLD AUTO: 3.24 10*6/MM3 (ref 4.14–5.8)
RETICS # AUTO: 0.02 10*6/MM3 (ref 0.02–0.13)
RETICS/RBC NFR AUTO: 0.48 % (ref 0.7–1.9)
SODIUM SERPL-SCNC: 137 MMOL/L (ref 136–145)
TIBC SERPL-MCNC: 317 MCG/DL (ref 298–536)
TRANSFERRIN SERPL-MCNC: 213 MG/DL (ref 200–360)
WBC # BLD AUTO: 4.48 10*3/MM3 (ref 3.4–10.8)
WBC MORPH BLD: NORMAL

## 2020-10-02 PROCEDURE — 85025 COMPLETE CBC W/AUTO DIFF WBC: CPT | Performed by: INTERNAL MEDICINE

## 2020-10-02 PROCEDURE — 85046 RETICYTE/HGB CONCENTRATE: CPT | Performed by: INTERNAL MEDICINE

## 2020-10-02 PROCEDURE — 80053 COMPREHEN METABOLIC PANEL: CPT | Performed by: INTERNAL MEDICINE

## 2020-10-02 PROCEDURE — 84466 ASSAY OF TRANSFERRIN: CPT | Performed by: INTERNAL MEDICINE

## 2020-10-02 PROCEDURE — 36415 COLL VENOUS BLD VENIPUNCTURE: CPT

## 2020-10-02 PROCEDURE — 99213 OFFICE O/P EST LOW 20 MIN: CPT | Performed by: INTERNAL MEDICINE

## 2020-10-02 PROCEDURE — 83540 ASSAY OF IRON: CPT | Performed by: INTERNAL MEDICINE

## 2020-10-02 PROCEDURE — 84100 ASSAY OF PHOSPHORUS: CPT | Performed by: INTERNAL MEDICINE

## 2020-10-02 PROCEDURE — 82728 ASSAY OF FERRITIN: CPT | Performed by: INTERNAL MEDICINE

## 2020-10-02 PROCEDURE — 85007 BL SMEAR W/DIFF WBC COUNT: CPT | Performed by: INTERNAL MEDICINE

## 2020-10-02 RX ORDER — CALCITRIOL 0.25 UG/1
CAPSULE, LIQUID FILLED ORAL
COMMUNITY
Start: 2020-08-20 | End: 2022-04-26

## 2020-10-02 NOTE — PROGRESS NOTES
[facility] GROUP OUTPATIENT FOLLOW UP CLINIC VISIT    REASON FOR FOLLOW-UP:    1.  Normocytic anemia secondary to chronic kidney disease stage IV  2.  Faint monoclonal lambda light chain discovered on serum immunofixation.  Labs repeated here did not demonstrate abnormality.  3.  Procrit initiated on 12/19/2019 at 20,000 units with an excellent response in his hemoglobin  4.  Right partial nephrectomy on 1/3/2020 with findings including an epithelial cyst with a clear cell lining, background glomerulosclerosis inflammation and amorphous material consistent with chronic kidney disease.  Pathology forwarded to the [facility].  Negative for malignanch.    HISTORY OF PRESENT ILLNESS:  Donald Johnson is a 70 y.o. male with the above-mentioned history, who returns the office today for scheduled follow-up.    We resumed Procrit on 4/17 with a hgb of 9.8.      Last Procrit was on 9/4/2020 at 16,000 units for a hgb of 9.8. Hgb has been greater than 10 since that time.     He remains very fatigued.  He states that his son is a match for him for a kidney transplant.  His son is going through the pretransplant process now.  He is hopeful for a kidney transplant in the near future.  No bleeding.  No lower extremity edema.      HEMATOLOGIC HISTORY:  He recently saw Dr. Conroy with nephrology.  Long history of type 2 diabetes and hypertension noted.  Labs showed a faint monoclonal lambda light chain on serum immunofixation.  Urine immunofixation was negative, only showing polyclonal light chains.  Serum protein electrophoresis was negative for a monoclonal protein.  His total protein level on the CMP was 6.0 with an albumin of 3.5.  His calcium was normal at 8.9.  His creatinine was elevated at 2.96.  He was anemic with hemoglobin of 9.7 with hematocrit 26%.  MCV was normal at 92.7.  White blood cells were normal at 7.3 with platelets 208,000.     Previously on 1/18/2016 his creatinine was 2.1 with a  "hemoglobin of 12.7.      Initial labs here in the office showed a normal serum protein electrophoresis with immunofixation on 2/26/2018.  Serum free light chain K/L ratio was normal although both light chains were elevated secondary to his kidney disease.  His erythropoietin level was normal at 7.7.  Creatinine was 2.64.  Iron studies and vitamin B12 are adequate.    ALLERGIES:  Allergies   Allergen Reactions   • Amlodipine Swelling     Worse LE edema.       MEDICATIONS:  The medication list has been reviewed with the patient by the medical assistant, and the list has been updated in the electronic medical record, which I reviewed.  Medication dosages and frequencies were confirmed to be accurate.    I have reviewed the patient's medical history in detail and updated the computerized patient record.    Review of Systems   Constitutional: Positive for fatigue (Worsening). Negative for appetite change, chills and fever.   HENT:   Negative for trouble swallowing.    Respiratory: Negative for cough and shortness of breath.    Cardiovascular: Negative for chest pain and leg swelling.   Gastrointestinal: Negative for abdominal distention, blood in stool, constipation, diarrhea and nausea.   Musculoskeletal: Positive for arthralgias (R knee pain and effusion) and back pain (chronic). Negative for myalgias.   Skin: Negative for rash.   Neurological: Negative for dizziness, extremity weakness and light-headedness.   Hematological: Does not bruise/bleed easily.   Reviewed 10/2/2020      Vitals:    10/02/20 1025   BP: 131/62   Pulse: 55   Resp: 16   Temp: 97.3 °F (36.3 °C)   TempSrc: Temporal   SpO2: 97%   Weight: 102 kg (224 lb 4.8 oz)   Height: 184.5 cm (72.64\")   PainSc: 0-No pain  Comment: anemia       PHYSICAL EXAMINATION:  General:  No acute distress, awake, alert and oriented  Skin:  Warm and dry, no visible rash  HEENT:  normocephalic/atraumatic.  Wearing a mask.  Chest:  Normal respiratory effort.  Lungs clear to " auscultation bilaterally.  Heart: Regular rate and rhythm with a grade 2 out of 6 systolic murmur  Extremities:  No clubbing, cyanosis, or edema  Neuro/psych:  Grossly non-focal.  Normal mood and affect.      DIAGNOSTIC DATA:    Results for orders placed or performed in visit on 10/02/20   Ferritin    Specimen: Blood   Result Value Ref Range    Ferritin 136.50 30.00 - 400.00 ng/mL   Iron Profile    Specimen: Blood   Result Value Ref Range    Iron 146 59 - 158 mcg/dL    Iron Saturation 46 20 - 50 %    Transferrin 213 200 - 360 mg/dL    TIBC 317 298 - 536 mcg/dL   Retic With IRF & RET-He    Specimen: Blood   Result Value Ref Range    Immature Reticulocyte Fraction 3.9 3.0 - 15.8 %    Reticulocyte % 0.48 (L) 0.70 - 1.90 %    Reticulocyte Absolute 0.0156 (L) 0.0200 - 0.1300 10*6/mm3    Reticulocyte Hgb 34.2 29.8 - 36.1 pg   Comprehensive Metabolic Panel    Specimen: Blood   Result Value Ref Range    Glucose 200 (H) 65 - 99 mg/dL    BUN 93 (H) 8 - 23 mg/dL    Creatinine 5.24 (H) 0.76 - 1.27 mg/dL    Sodium 137 136 - 145 mmol/L    Potassium 4.8 3.5 - 5.2 mmol/L    Chloride 104 98 - 107 mmol/L    CO2 23.9 22.0 - 29.0 mmol/L    Calcium 9.2 8.6 - 10.5 mg/dL    Total Protein 6.1 6.0 - 8.5 g/dL    Albumin 3.40 (L) 3.50 - 5.20 g/dL    ALT (SGPT) 23 1 - 41 U/L    AST (SGOT) 20 1 - 40 U/L    Alkaline Phosphatase 101 39 - 117 U/L    Total Bilirubin 0.3 0.0 - 1.2 mg/dL    eGFR Non African Amer 11 (L) >60 mL/min/1.73    eGFR  African Amer      Globulin 2.7 gm/dL    A/G Ratio 1.3 g/dL    BUN/Creatinine Ratio 17.7 7.0 - 25.0    Anion Gap 9.1 5.0 - 15.0 mmol/L   Phosphorus    Specimen: Blood   Result Value Ref Range    Phosphorus 5.9 (H) 2.5 - 4.5 mg/dL   CBC Auto Differential    Specimen: Blood   Result Value Ref Range    WBC 4.48 3.40 - 10.80 10*3/mm3    RBC 3.24 (L) 4.14 - 5.80 10*6/mm3    Hemoglobin 10.2 (L) 13.0 - 17.7 g/dL    Hematocrit 30.9 (L) 37.5 - 51.0 %    MCV 95.4 79.0 - 97.0 fL    MCH 31.5 26.6 - 33.0 pg    MCHC 33.0 31.5  - 35.7 g/dL    RDW 13.8 12.3 - 15.4 %    RDW-SD 48.2 37.0 - 54.0 fl    MPV 11.4 6.0 - 12.0 fL    Platelets 200 140 - 450 10*3/mm3    Neutrophil % 53.1 42.7 - 76.0 %    Lymphocyte % 18.3 (L) 19.6 - 45.3 %    Monocyte % 9.4 5.0 - 12.0 %    Eosinophil % 18.3 (H) 0.3 - 6.2 %    Basophil % 0.7 0.0 - 1.5 %    Immature Grans % 0.2 0.0 - 0.5 %    Neutrophils, Absolute 2.38 1.70 - 7.00 10*3/mm3    Lymphocytes, Absolute 0.82 0.70 - 3.10 10*3/mm3    Monocytes, Absolute 0.42 0.10 - 0.90 10*3/mm3    Eosinophils, Absolute 0.82 (H) 0.00 - 0.40 10*3/mm3    Basophils, Absolute 0.03 0.00 - 0.20 10*3/mm3    Immature Grans, Absolute 0.01 0.00 - 0.05 10*3/mm3    nRBC 0.0 0.0 - 0.2 /100 WBC   Scan Slide    Specimen: Blood   Result Value Ref Range    RBC Fragments Slight/1+ None Seen    WBC Morphology Normal Normal    Platelet Morphology Normal Normal     IMAGING: None reviewed    ASSESSMENT:  This is a 70 y.o. male with:  1.  Chronic kidney disease stage 4-5.  He follows with Dr. Conroy with nephrology.  Hopeful for a kidney transplant in the near future.  Nearing hemodialysis otherwise.    2.  Normocytic anemia related chronic kidney disease: With hemoglobin of 9.4 on 12/19/2019 he initiated Procrit 20,000 units.  Hemoglobin improved to 10.1 as of 12/30/2019.  Hemoglobin dropped after his right partial nephrectomy to 7.0 on 1/4/2020 and then 6.8 here in the office on 1/17/2020 with significant hematuria.    He received Procrit through 1/31/2020.  He received 2 units of packed red blood cells.      His hemoglobin improved nicely to 10.6     Declined to 9.8 on 4/17 and we resumed Procrit at 18,000 units which he also received on 5/29 for a hgb of 9.0.    He has not required Procrit in several weeks as his hemoglobin has been greater than 10.  We will resume a once monthly schedule.    3.  Faint monoclonal lambda light chain on serum immunofixation: Clinically insignificant and this was not visible on repeat labs here on 2/26/2018.  Repeat  labs 12/6/2018 without significant abnormality.     4.  Right kidney lesion status post right partial nephrectomy on 1/3/2020.  Final pathology on review of the [facility] is benign.    5.  Hematuria following right partial nephrectomy.  Resolved.      6.  R knee effusion: Not a current issue      PLAN:  1. No Procrit is required today  2. Return about every 4 weeks with a CBC and Procrit if appropriate  3. I will see him back in about 3 months with a CBC reticulocyte count ferritin iron profile and a CMP.  4. Plans may change depending on if he gets a kidney transplant in the near future      Pollo Hernandez MD

## 2020-10-30 ENCOUNTER — INFUSION (OUTPATIENT)
Dept: ONCOLOGY | Facility: HOSPITAL | Age: 70
End: 2020-10-30

## 2020-10-30 ENCOUNTER — LAB (OUTPATIENT)
Dept: OTHER | Facility: HOSPITAL | Age: 70
End: 2020-10-30

## 2020-10-30 VITALS — TEMPERATURE: 97.5 F | WEIGHT: 228.2 LBS | BODY MASS INDEX: 30.41 KG/M2

## 2020-10-30 DIAGNOSIS — N18.4 ANEMIA OF CHRONIC KIDNEY FAILURE, STAGE 4 (SEVERE) (HCC): ICD-10-CM

## 2020-10-30 DIAGNOSIS — D63.1 ANEMIA OF CHRONIC KIDNEY FAILURE, STAGE 4 (SEVERE) (HCC): Primary | ICD-10-CM

## 2020-10-30 DIAGNOSIS — D63.1 ANEMIA OF CHRONIC KIDNEY FAILURE, STAGE 4 (SEVERE) (HCC): ICD-10-CM

## 2020-10-30 DIAGNOSIS — N18.4 ANEMIA OF CHRONIC KIDNEY FAILURE, STAGE 4 (SEVERE) (HCC): Primary | ICD-10-CM

## 2020-10-30 LAB
BASOPHILS # BLD AUTO: 0.03 10*3/MM3 (ref 0–0.2)
BASOPHILS NFR BLD AUTO: 0.6 % (ref 0–1.5)
DEPRECATED RDW RBC AUTO: 46.5 FL (ref 37–54)
EOSINOPHIL # BLD AUTO: 0.81 10*3/MM3 (ref 0–0.4)
EOSINOPHIL NFR BLD AUTO: 15.5 % (ref 0.3–6.2)
ERYTHROCYTE [DISTWIDTH] IN BLOOD BY AUTOMATED COUNT: 14.1 % (ref 12.3–15.4)
HCT VFR BLD AUTO: 26.1 % (ref 37.5–51)
HGB BLD-MCNC: 8.7 G/DL (ref 13–17.7)
IMM GRANULOCYTES # BLD AUTO: 0.02 10*3/MM3 (ref 0–0.05)
IMM GRANULOCYTES NFR BLD AUTO: 0.4 % (ref 0–0.5)
LYMPHOCYTES # BLD AUTO: 0.99 10*3/MM3 (ref 0.7–3.1)
LYMPHOCYTES NFR BLD AUTO: 19 % (ref 19.6–45.3)
MCH RBC QN AUTO: 30.3 PG (ref 26.6–33)
MCHC RBC AUTO-ENTMCNC: 33.3 G/DL (ref 31.5–35.7)
MCV RBC AUTO: 90.9 FL (ref 79–97)
MONOCYTES # BLD AUTO: 0.51 10*3/MM3 (ref 0.1–0.9)
MONOCYTES NFR BLD AUTO: 9.8 % (ref 5–12)
NEUTROPHILS NFR BLD AUTO: 2.85 10*3/MM3 (ref 1.7–7)
NEUTROPHILS NFR BLD AUTO: 54.7 % (ref 42.7–76)
NRBC BLD AUTO-RTO: 0 /100 WBC (ref 0–0.2)
PLATELET # BLD AUTO: 185 10*3/MM3 (ref 140–450)
PMV BLD AUTO: 11.9 FL (ref 6–12)
RBC # BLD AUTO: 2.87 10*6/MM3 (ref 4.14–5.8)
WBC # BLD AUTO: 5.21 10*3/MM3 (ref 3.4–10.8)

## 2020-10-30 PROCEDURE — 85025 COMPLETE CBC W/AUTO DIFF WBC: CPT | Performed by: INTERNAL MEDICINE

## 2020-10-30 PROCEDURE — 36415 COLL VENOUS BLD VENIPUNCTURE: CPT

## 2020-10-30 PROCEDURE — 25010000002 EPOETIN ALFA PER 1000 UNITS: Performed by: NURSE PRACTITIONER

## 2020-10-30 PROCEDURE — 96372 THER/PROPH/DIAG INJ SC/IM: CPT

## 2020-10-30 RX ADMIN — ERYTHROPOIETIN 12000 UNITS: 20000 INJECTION, SOLUTION INTRAVENOUS; SUBCUTANEOUS at 10:55

## 2020-10-30 NOTE — NURSING NOTE
Lab Results   Component Value Date    WBC 5.21 10/30/2020    HGB 8.7 (L) 10/30/2020    HCT 26.1 (L) 10/30/2020    MCV 90.9 10/30/2020     10/30/2020     Procrit given per protocol.  Pt voices feeling fatigue and awaiting results from a previous test on his kidney.  Pt son getting work up for kidney donation.  Pt voices concern about awaiting results.  Pt to return in 2 weeks to reassess drop in hgb.  Pt instructed to call sooner with concerns and v/u.

## 2020-11-13 ENCOUNTER — OFFICE VISIT (OUTPATIENT)
Dept: ONCOLOGY | Facility: CLINIC | Age: 70
End: 2020-11-13

## 2020-11-13 ENCOUNTER — APPOINTMENT (OUTPATIENT)
Dept: ONCOLOGY | Facility: HOSPITAL | Age: 70
End: 2020-11-13

## 2020-11-13 ENCOUNTER — LAB (OUTPATIENT)
Dept: OTHER | Facility: HOSPITAL | Age: 70
End: 2020-11-13

## 2020-11-13 ENCOUNTER — INFUSION (OUTPATIENT)
Dept: ONCOLOGY | Facility: HOSPITAL | Age: 70
End: 2020-11-13

## 2020-11-13 VITALS — TEMPERATURE: 97.5 F

## 2020-11-13 DIAGNOSIS — N18.4 ANEMIA OF CHRONIC KIDNEY FAILURE, STAGE 4 (SEVERE) (HCC): Primary | ICD-10-CM

## 2020-11-13 DIAGNOSIS — D63.1 ANEMIA OF CHRONIC KIDNEY FAILURE, STAGE 4 (SEVERE) (HCC): Primary | ICD-10-CM

## 2020-11-13 DIAGNOSIS — N18.4 ANEMIA OF CHRONIC KIDNEY FAILURE, STAGE 4 (SEVERE) (HCC): ICD-10-CM

## 2020-11-13 DIAGNOSIS — D63.1 ANEMIA OF CHRONIC KIDNEY FAILURE, STAGE 4 (SEVERE) (HCC): ICD-10-CM

## 2020-11-13 LAB
BASOPHILS # BLD AUTO: 0.02 10*3/MM3 (ref 0–0.2)
BASOPHILS NFR BLD AUTO: 0.4 % (ref 0–1.5)
DEPRECATED RDW RBC AUTO: 52.6 FL (ref 37–54)
EOSINOPHIL # BLD AUTO: 0.73 10*3/MM3 (ref 0–0.4)
EOSINOPHIL NFR BLD AUTO: 16 % (ref 0.3–6.2)
ERYTHROCYTE [DISTWIDTH] IN BLOOD BY AUTOMATED COUNT: 15.6 % (ref 12.3–15.4)
HCT VFR BLD AUTO: 27.4 % (ref 37.5–51)
HGB BLD-MCNC: 9.2 G/DL (ref 13–17.7)
IMM GRANULOCYTES # BLD AUTO: 0.02 10*3/MM3 (ref 0–0.05)
IMM GRANULOCYTES NFR BLD AUTO: 0.4 % (ref 0–0.5)
LYMPHOCYTES # BLD AUTO: 0.77 10*3/MM3 (ref 0.7–3.1)
LYMPHOCYTES NFR BLD AUTO: 16.8 % (ref 19.6–45.3)
MCH RBC QN AUTO: 31.2 PG (ref 26.6–33)
MCHC RBC AUTO-ENTMCNC: 33.6 G/DL (ref 31.5–35.7)
MCV RBC AUTO: 92.9 FL (ref 79–97)
MONOCYTES # BLD AUTO: 0.46 10*3/MM3 (ref 0.1–0.9)
MONOCYTES NFR BLD AUTO: 10.1 % (ref 5–12)
NEUTROPHILS NFR BLD AUTO: 2.57 10*3/MM3 (ref 1.7–7)
NEUTROPHILS NFR BLD AUTO: 56.3 % (ref 42.7–76)
NRBC BLD AUTO-RTO: 0 /100 WBC (ref 0–0.2)
PLATELET # BLD AUTO: 186 10*3/MM3 (ref 140–450)
PMV BLD AUTO: 11.5 FL (ref 6–12)
RBC # BLD AUTO: 2.95 10*6/MM3 (ref 4.14–5.8)
WBC # BLD AUTO: 4.57 10*3/MM3 (ref 3.4–10.8)

## 2020-11-13 PROCEDURE — 85025 COMPLETE CBC W/AUTO DIFF WBC: CPT | Performed by: INTERNAL MEDICINE

## 2020-11-13 PROCEDURE — 25010000002 EPOETIN ALFA PER 1000 UNITS: Performed by: NURSE PRACTITIONER

## 2020-11-13 PROCEDURE — 36415 COLL VENOUS BLD VENIPUNCTURE: CPT

## 2020-11-13 PROCEDURE — 96372 THER/PROPH/DIAG INJ SC/IM: CPT

## 2020-11-13 PROCEDURE — 99212-NC PR NO CHARGE CBC OFFICE OUTPATIENT VISIT 10 MINUTES: Performed by: NURSE PRACTITIONER

## 2020-11-13 RX ADMIN — ERYTHROPOIETIN 12000 UNITS: 20000 INJECTION, SOLUTION INTRAVENOUS; SUBCUTANEOUS at 10:23

## 2020-11-13 NOTE — PROGRESS NOTES
Patient here for lab review today.  He continues to complain of issues with fatigue.  Denies bleeding issues.    Lab Results   Component Value Date    WBC 4.57 11/13/2020    HGB 9.2 (L) 11/13/2020    HCT 27.4 (L) 11/13/2020    MCV 92.9 11/13/2020     11/13/2020     We will proceed with Procrit today.  Patient will return in 2 weeks as scheduled for CBC with RN review and possible Procrit.    STEVEN Hodges

## 2020-11-24 ENCOUNTER — DOCUMENTATION (OUTPATIENT)
Dept: ONCOLOGY | Facility: CLINIC | Age: 70
End: 2020-11-24

## 2020-11-24 NOTE — PROGRESS NOTES
Patient may return weekly if needed for CBC with RN review and Procrit if indicated.    STEVEN Hodges

## 2020-11-25 ENCOUNTER — LAB (OUTPATIENT)
Dept: OTHER | Facility: HOSPITAL | Age: 70
End: 2020-11-25

## 2020-11-25 ENCOUNTER — INFUSION (OUTPATIENT)
Dept: ONCOLOGY | Facility: HOSPITAL | Age: 70
End: 2020-11-25

## 2020-11-25 VITALS — BODY MASS INDEX: 30.38 KG/M2 | WEIGHT: 228 LBS

## 2020-11-25 DIAGNOSIS — D63.1 ANEMIA OF CHRONIC KIDNEY FAILURE, STAGE 4 (SEVERE) (HCC): ICD-10-CM

## 2020-11-25 DIAGNOSIS — N18.4 ANEMIA OF CHRONIC KIDNEY FAILURE, STAGE 4 (SEVERE) (HCC): ICD-10-CM

## 2020-11-25 DIAGNOSIS — N18.4 ANEMIA OF CHRONIC KIDNEY FAILURE, STAGE 4 (SEVERE) (HCC): Primary | ICD-10-CM

## 2020-11-25 DIAGNOSIS — D63.1 ANEMIA OF CHRONIC KIDNEY FAILURE, STAGE 4 (SEVERE) (HCC): Primary | ICD-10-CM

## 2020-11-25 LAB
BASOPHILS # BLD AUTO: 0.02 10*3/MM3 (ref 0–0.2)
BASOPHILS NFR BLD AUTO: 0.5 % (ref 0–1.5)
DEPRECATED RDW RBC AUTO: 54.9 FL (ref 37–54)
EOSINOPHIL # BLD AUTO: 0.52 10*3/MM3 (ref 0–0.4)
EOSINOPHIL NFR BLD AUTO: 12.1 % (ref 0.3–6.2)
ERYTHROCYTE [DISTWIDTH] IN BLOOD BY AUTOMATED COUNT: 16 % (ref 12.3–15.4)
HCT VFR BLD AUTO: 27.3 % (ref 37.5–51)
HGB BLD-MCNC: 9 G/DL (ref 13–17.7)
IMM GRANULOCYTES # BLD AUTO: 0.05 10*3/MM3 (ref 0–0.05)
IMM GRANULOCYTES NFR BLD AUTO: 1.2 % (ref 0–0.5)
LYMPHOCYTES # BLD AUTO: 0.63 10*3/MM3 (ref 0.7–3.1)
LYMPHOCYTES NFR BLD AUTO: 14.7 % (ref 19.6–45.3)
MCH RBC QN AUTO: 31.1 PG (ref 26.6–33)
MCHC RBC AUTO-ENTMCNC: 33 G/DL (ref 31.5–35.7)
MCV RBC AUTO: 94.5 FL (ref 79–97)
MONOCYTES # BLD AUTO: 0.49 10*3/MM3 (ref 0.1–0.9)
MONOCYTES NFR BLD AUTO: 11.4 % (ref 5–12)
NEUTROPHILS NFR BLD AUTO: 2.58 10*3/MM3 (ref 1.7–7)
NEUTROPHILS NFR BLD AUTO: 60.1 % (ref 42.7–76)
NRBC BLD AUTO-RTO: 0 /100 WBC (ref 0–0.2)
PLATELET # BLD AUTO: 174 10*3/MM3 (ref 140–450)
PMV BLD AUTO: 11.4 FL (ref 6–12)
RBC # BLD AUTO: 2.89 10*6/MM3 (ref 4.14–5.8)
WBC # BLD AUTO: 4.29 10*3/MM3 (ref 3.4–10.8)

## 2020-11-25 PROCEDURE — 25010000002 EPOETIN ALFA PER 1000 UNITS: Performed by: NURSE PRACTITIONER

## 2020-11-25 PROCEDURE — 85025 COMPLETE CBC W/AUTO DIFF WBC: CPT | Performed by: NURSE PRACTITIONER

## 2020-11-25 PROCEDURE — 96372 THER/PROPH/DIAG INJ SC/IM: CPT

## 2020-11-25 PROCEDURE — 36415 COLL VENOUS BLD VENIPUNCTURE: CPT

## 2020-11-25 RX ADMIN — ERYTHROPOIETIN 12000 UNITS: 20000 INJECTION, SOLUTION INTRAVENOUS; SUBCUTANEOUS at 10:48

## 2020-12-10 ENCOUNTER — TELEPHONE (OUTPATIENT)
Dept: ONCOLOGY | Facility: CLINIC | Age: 70
End: 2020-12-10

## 2020-12-10 NOTE — TELEPHONE ENCOUNTER
SPOKE WITH PT ABOUT HIS APPT AND HE STATED THAT THE DIALYSIS DEPT STATED THAT THEY WOULD TAKE CARE OF EVERYTHING, ADVISED PT IF HE NEEDED US TO CALL US BACK.

## 2020-12-10 NOTE — TELEPHONE ENCOUNTER
Patient called please cancel all appts for 12/18 this is being taken care of through his dialysis center    Dorsal Nasal Flap Text: The defect edges were debeveled with a #15 scalpel blade.  Given the location of the defect and the proximity to free margins a dorsal nasal flap was deemed most appropriate.  Using a sterile surgical marker, an appropriate dorsal nasal flap was drawn around the defect.    The area thus outlined was incised deep to adipose tissue with a #15 scalpel blade.  The skin margins were undermined to an appropriate distance in all directions utilizing iris scissors.

## 2020-12-18 ENCOUNTER — APPOINTMENT (OUTPATIENT)
Dept: ONCOLOGY | Facility: HOSPITAL | Age: 70
End: 2020-12-18

## 2020-12-18 ENCOUNTER — APPOINTMENT (OUTPATIENT)
Dept: OTHER | Facility: HOSPITAL | Age: 70
End: 2020-12-18

## 2021-01-31 ENCOUNTER — APPOINTMENT (OUTPATIENT)
Dept: GENERAL RADIOLOGY | Facility: HOSPITAL | Age: 71
End: 2021-01-31

## 2021-01-31 ENCOUNTER — APPOINTMENT (OUTPATIENT)
Dept: CT IMAGING | Facility: HOSPITAL | Age: 71
End: 2021-01-31

## 2021-01-31 ENCOUNTER — HOSPITAL ENCOUNTER (INPATIENT)
Facility: HOSPITAL | Age: 71
LOS: 2 days | Discharge: HOME | End: 2021-02-02
Attending: EMERGENCY MEDICINE | Admitting: INTERNAL MEDICINE

## 2021-01-31 DIAGNOSIS — N18.4 ANEMIA OF CHRONIC KIDNEY FAILURE, STAGE 4 (SEVERE) (HCC): ICD-10-CM

## 2021-01-31 DIAGNOSIS — N18.4 STAGE 4 CHRONIC KIDNEY DISEASE (HCC): ICD-10-CM

## 2021-01-31 DIAGNOSIS — R00.1 BRADYCARDIA: ICD-10-CM

## 2021-01-31 DIAGNOSIS — D63.1 ANEMIA OF CHRONIC KIDNEY FAILURE, STAGE 4 (SEVERE) (HCC): ICD-10-CM

## 2021-01-31 DIAGNOSIS — I48.3 TYPICAL ATRIAL FLUTTER (HCC): Primary | ICD-10-CM

## 2021-01-31 PROBLEM — R55 SYNCOPE: Status: ACTIVE | Noted: 2021-01-31

## 2021-01-31 PROBLEM — Z76.82 PATIENT AWAITING RENAL TRANSPLANT: Status: ACTIVE | Noted: 2021-01-31

## 2021-01-31 PROBLEM — N18.6 ESRD (END STAGE RENAL DISEASE): Status: ACTIVE | Noted: 2021-01-31

## 2021-01-31 LAB
ALBUMIN SERPL-MCNC: 4.1 G/DL (ref 3.5–5.2)
ALBUMIN/GLOB SERPL: 2.2 G/DL
ALP SERPL-CCNC: 110 U/L (ref 39–117)
ALT SERPL W P-5'-P-CCNC: 24 U/L (ref 1–41)
ANION GAP SERPL CALCULATED.3IONS-SCNC: 10.7 MMOL/L (ref 5–15)
AST SERPL-CCNC: 16 U/L (ref 1–40)
BASOPHILS # BLD AUTO: 0.03 10*3/MM3 (ref 0–0.2)
BASOPHILS NFR BLD AUTO: 0.6 % (ref 0–1.5)
BILIRUB SERPL-MCNC: 0.3 MG/DL (ref 0–1.2)
BUN SERPL-MCNC: 52 MG/DL (ref 8–23)
BUN/CREAT SERPL: 10.8 (ref 7–25)
CALCIUM SPEC-SCNC: 9.3 MG/DL (ref 8.6–10.5)
CHLORIDE SERPL-SCNC: 104 MMOL/L (ref 98–107)
CO2 SERPL-SCNC: 24.3 MMOL/L (ref 22–29)
CREAT SERPL-MCNC: 4.81 MG/DL (ref 0.76–1.27)
DEPRECATED RDW RBC AUTO: 46.3 FL (ref 37–54)
EOSINOPHIL # BLD AUTO: 0.46 10*3/MM3 (ref 0–0.4)
EOSINOPHIL NFR BLD AUTO: 9.5 % (ref 0.3–6.2)
ERYTHROCYTE [DISTWIDTH] IN BLOOD BY AUTOMATED COUNT: 13 % (ref 12.3–15.4)
GFR SERPL CREATININE-BSD FRML MDRD: 12 ML/MIN/1.73
GFR SERPL CREATININE-BSD FRML MDRD: ABNORMAL ML/MIN/{1.73_M2}
GLOBULIN UR ELPH-MCNC: 1.9 GM/DL
GLUCOSE BLDC GLUCOMTR-MCNC: 130 MG/DL (ref 70–130)
GLUCOSE BLDC GLUCOMTR-MCNC: 153 MG/DL (ref 70–130)
GLUCOSE SERPL-MCNC: 215 MG/DL (ref 65–99)
HCT VFR BLD AUTO: 28.4 % (ref 37.5–51)
HGB BLD-MCNC: 9.1 G/DL (ref 13–17.7)
IMM GRANULOCYTES # BLD AUTO: 0.02 10*3/MM3 (ref 0–0.05)
IMM GRANULOCYTES NFR BLD AUTO: 0.4 % (ref 0–0.5)
LYMPHOCYTES # BLD AUTO: 0.77 10*3/MM3 (ref 0.7–3.1)
LYMPHOCYTES NFR BLD AUTO: 15.9 % (ref 19.6–45.3)
MCH RBC QN AUTO: 31.8 PG (ref 26.6–33)
MCHC RBC AUTO-ENTMCNC: 32 G/DL (ref 31.5–35.7)
MCV RBC AUTO: 99.3 FL (ref 79–97)
MONOCYTES # BLD AUTO: 0.39 10*3/MM3 (ref 0.1–0.9)
MONOCYTES NFR BLD AUTO: 8.1 % (ref 5–12)
NEUTROPHILS NFR BLD AUTO: 3.16 10*3/MM3 (ref 1.7–7)
NEUTROPHILS NFR BLD AUTO: 65.5 % (ref 42.7–76)
NRBC BLD AUTO-RTO: 0 /100 WBC (ref 0–0.2)
PLATELET # BLD AUTO: 173 10*3/MM3 (ref 140–450)
PMV BLD AUTO: 10.9 FL (ref 6–12)
POTASSIUM SERPL-SCNC: 4.4 MMOL/L (ref 3.5–5.2)
PROT SERPL-MCNC: 6 G/DL (ref 6–8.5)
QT INTERVAL: 432 MS
RBC # BLD AUTO: 2.86 10*6/MM3 (ref 4.14–5.8)
SARS-COV-2 ORF1AB RESP QL NAA+PROBE: NOT DETECTED
SODIUM SERPL-SCNC: 139 MMOL/L (ref 136–145)
WBC # BLD AUTO: 4.83 10*3/MM3 (ref 3.4–10.8)

## 2021-01-31 PROCEDURE — 70450 CT HEAD/BRAIN W/O DYE: CPT

## 2021-01-31 PROCEDURE — 99284 EMERGENCY DEPT VISIT MOD MDM: CPT

## 2021-01-31 PROCEDURE — 93005 ELECTROCARDIOGRAM TRACING: CPT | Performed by: NURSE PRACTITIONER

## 2021-01-31 PROCEDURE — 80053 COMPREHEN METABOLIC PANEL: CPT | Performed by: NURSE PRACTITIONER

## 2021-01-31 PROCEDURE — 71111 X-RAY EXAM RIBS/CHEST4/> VWS: CPT

## 2021-01-31 PROCEDURE — 85025 COMPLETE CBC W/AUTO DIFF WBC: CPT | Performed by: NURSE PRACTITIONER

## 2021-01-31 PROCEDURE — 99222 1ST HOSP IP/OBS MODERATE 55: CPT | Performed by: INTERNAL MEDICINE

## 2021-01-31 PROCEDURE — U0004 COV-19 TEST NON-CDC HGH THRU: HCPCS | Performed by: INTERNAL MEDICINE

## 2021-01-31 PROCEDURE — 82962 GLUCOSE BLOOD TEST: CPT

## 2021-01-31 PROCEDURE — 93010 ELECTROCARDIOGRAM REPORT: CPT | Performed by: INTERNAL MEDICINE

## 2021-01-31 RX ORDER — ALBUMIN (HUMAN) 12.5 G/50ML
12.5 SOLUTION INTRAVENOUS AS NEEDED
Status: CANCELLED | OUTPATIENT
Start: 2021-02-01 | End: 2021-02-01

## 2021-01-31 RX ORDER — CALCIUM ACETATE 667 MG/1
1334 CAPSULE ORAL
Status: DISCONTINUED | OUTPATIENT
Start: 2021-02-01 | End: 2021-02-02 | Stop reason: HOSPADM

## 2021-01-31 RX ORDER — ATORVASTATIN CALCIUM 20 MG/1
40 TABLET, FILM COATED ORAL DAILY
Status: DISCONTINUED | OUTPATIENT
Start: 2021-02-01 | End: 2021-02-02 | Stop reason: HOSPADM

## 2021-01-31 RX ORDER — DEXTROSE MONOHYDRATE 25 G/50ML
25 INJECTION, SOLUTION INTRAVENOUS
Status: DISCONTINUED | OUTPATIENT
Start: 2021-01-31 | End: 2021-02-02 | Stop reason: HOSPADM

## 2021-01-31 RX ORDER — INSULIN LISPRO 100 [IU]/ML
0-7 INJECTION, SOLUTION INTRAVENOUS; SUBCUTANEOUS
Status: DISCONTINUED | OUTPATIENT
Start: 2021-01-31 | End: 2021-02-02 | Stop reason: HOSPADM

## 2021-01-31 RX ORDER — TAMSULOSIN HYDROCHLORIDE 0.4 MG/1
0.8 CAPSULE ORAL DAILY
Status: DISCONTINUED | OUTPATIENT
Start: 2021-02-01 | End: 2021-02-02 | Stop reason: HOSPADM

## 2021-01-31 RX ORDER — ONDANSETRON 2 MG/ML
4 INJECTION INTRAMUSCULAR; INTRAVENOUS EVERY 6 HOURS PRN
Status: DISCONTINUED | OUTPATIENT
Start: 2021-01-31 | End: 2021-02-02 | Stop reason: HOSPADM

## 2021-01-31 RX ORDER — CALCIUM ACETATE 667 MG/1
1334 CAPSULE ORAL 3 TIMES DAILY
COMMUNITY
End: 2022-04-26

## 2021-01-31 RX ORDER — ACETAMINOPHEN 325 MG/1
650 TABLET ORAL EVERY 4 HOURS PRN
Status: DISCONTINUED | OUTPATIENT
Start: 2021-01-31 | End: 2021-02-02 | Stop reason: HOSPADM

## 2021-01-31 RX ORDER — ONDANSETRON 4 MG/1
4 TABLET, FILM COATED ORAL EVERY 6 HOURS PRN
Status: DISCONTINUED | OUTPATIENT
Start: 2021-01-31 | End: 2021-02-02 | Stop reason: HOSPADM

## 2021-01-31 RX ORDER — DOCUSATE SODIUM 100 MG/1
100 CAPSULE, LIQUID FILLED ORAL 2 TIMES DAILY PRN
Status: DISCONTINUED | OUTPATIENT
Start: 2021-01-31 | End: 2021-02-02 | Stop reason: HOSPADM

## 2021-01-31 RX ORDER — SODIUM CHLORIDE 0.9 % (FLUSH) 0.9 %
10 SYRINGE (ML) INJECTION EVERY 12 HOURS SCHEDULED
Status: DISCONTINUED | OUTPATIENT
Start: 2021-01-31 | End: 2021-02-02 | Stop reason: HOSPADM

## 2021-01-31 RX ORDER — ALLOPURINOL 100 MG/1
100 TABLET ORAL EVERY OTHER DAY
Status: DISCONTINUED | OUTPATIENT
Start: 2021-02-01 | End: 2021-02-02 | Stop reason: HOSPADM

## 2021-01-31 RX ORDER — NICOTINE POLACRILEX 4 MG
15 LOZENGE BUCCAL
Status: DISCONTINUED | OUTPATIENT
Start: 2021-01-31 | End: 2021-02-02 | Stop reason: HOSPADM

## 2021-01-31 RX ORDER — ISOSORBIDE MONONITRATE 60 MG/1
120 TABLET, EXTENDED RELEASE ORAL DAILY
Status: DISCONTINUED | OUTPATIENT
Start: 2021-02-01 | End: 2021-02-02 | Stop reason: HOSPADM

## 2021-01-31 RX ORDER — DOCUSATE SODIUM 100 MG/1
100 CAPSULE, LIQUID FILLED ORAL 2 TIMES DAILY
Status: DISCONTINUED | OUTPATIENT
Start: 2021-01-31 | End: 2021-01-31

## 2021-01-31 RX ORDER — CALCITRIOL 0.25 UG/1
0.25 CAPSULE, LIQUID FILLED ORAL DAILY
Status: DISCONTINUED | OUTPATIENT
Start: 2021-02-01 | End: 2021-02-02 | Stop reason: HOSPADM

## 2021-01-31 RX ORDER — SODIUM CHLORIDE 0.9 % (FLUSH) 0.9 %
10 SYRINGE (ML) INJECTION AS NEEDED
Status: DISCONTINUED | OUTPATIENT
Start: 2021-01-31 | End: 2021-02-02 | Stop reason: HOSPADM

## 2021-01-31 RX ADMIN — APIXABAN 5 MG: 5 TABLET, FILM COATED ORAL at 14:24

## 2021-01-31 RX ADMIN — SODIUM CHLORIDE, PRESERVATIVE FREE 10 ML: 5 INJECTION INTRAVENOUS at 21:15

## 2021-01-31 RX ADMIN — SODIUM CHLORIDE, PRESERVATIVE FREE 10 ML: 5 INJECTION INTRAVENOUS at 14:25

## 2021-02-01 ENCOUNTER — APPOINTMENT (OUTPATIENT)
Dept: CARDIOLOGY | Facility: HOSPITAL | Age: 71
End: 2021-02-01

## 2021-02-01 LAB
ALBUMIN SERPL-MCNC: 3.5 G/DL (ref 3.5–5.2)
ANION GAP SERPL CALCULATED.3IONS-SCNC: 9.6 MMOL/L (ref 5–15)
AORTIC DIMENSIONLESS INDEX: 0.7 (DI)
BASOPHILS # BLD AUTO: 0.02 10*3/MM3 (ref 0–0.2)
BASOPHILS NFR BLD AUTO: 0.4 % (ref 0–1.5)
BH CV ECHO MEAS - AO MAX PG (FULL): 3.6 MMHG
BH CV ECHO MEAS - AO MAX PG: 6.2 MMHG
BH CV ECHO MEAS - AO MEAN PG (FULL): 2 MMHG
BH CV ECHO MEAS - AO MEAN PG: 4 MMHG
BH CV ECHO MEAS - AO ROOT AREA (BSA CORRECTED): 1.7
BH CV ECHO MEAS - AO ROOT AREA: 11.3 CM^2
BH CV ECHO MEAS - AO ROOT DIAM: 3.8 CM
BH CV ECHO MEAS - AO V2 MAX: 124 CM/SEC
BH CV ECHO MEAS - AO V2 MEAN: 91.2 CM/SEC
BH CV ECHO MEAS - AO V2 VTI: 21.2 CM
BH CV ECHO MEAS - ASC AORTA: 3.7 CM
BH CV ECHO MEAS - AVA(I,A): 3.5 CM^2
BH CV ECHO MEAS - AVA(I,D): 3.5 CM^2
BH CV ECHO MEAS - AVA(V,A): 3.2 CM^2
BH CV ECHO MEAS - AVA(V,D): 3.2 CM^2
BH CV ECHO MEAS - BSA(HAYCOCK): 2.3 M^2
BH CV ECHO MEAS - BSA: 2.2 M^2
BH CV ECHO MEAS - BZI_BMI: 28.8 KILOGRAMS/M^2
BH CV ECHO MEAS - BZI_METRIC_HEIGHT: 185.4 CM
BH CV ECHO MEAS - BZI_METRIC_WEIGHT: 98.9 KG
BH CV ECHO MEAS - EDV(CUBED): 132.7 ML
BH CV ECHO MEAS - EDV(MOD-SP2): 175 ML
BH CV ECHO MEAS - EDV(MOD-SP4): 136 ML
BH CV ECHO MEAS - EDV(TEICH): 123.8 ML
BH CV ECHO MEAS - EF(CUBED): 51.8 %
BH CV ECHO MEAS - EF(MOD-BP): 67 %
BH CV ECHO MEAS - EF(MOD-SP2): 67.4 %
BH CV ECHO MEAS - EF(MOD-SP4): 61 %
BH CV ECHO MEAS - EF(TEICH): 43.5 %
BH CV ECHO MEAS - ESV(CUBED): 64 ML
BH CV ECHO MEAS - ESV(MOD-SP2): 57 ML
BH CV ECHO MEAS - ESV(MOD-SP4): 53 ML
BH CV ECHO MEAS - ESV(TEICH): 70 ML
BH CV ECHO MEAS - FS: 21.6 %
BH CV ECHO MEAS - IVS/LVPW: 1
BH CV ECHO MEAS - IVSD: 1.3 CM
BH CV ECHO MEAS - LAT PEAK E' VEL: 13.7 CM/SEC
BH CV ECHO MEAS - LV DIASTOLIC VOL/BSA (35-75): 60.9 ML/M^2
BH CV ECHO MEAS - LV MASS(C)D: 270.1 GRAMS
BH CV ECHO MEAS - LV MASS(C)DI: 121 GRAMS/M^2
BH CV ECHO MEAS - LV MAX PG: 2.5 MMHG
BH CV ECHO MEAS - LV MEAN PG: 2 MMHG
BH CV ECHO MEAS - LV SYSTOLIC VOL/BSA (12-30): 23.7 ML/M^2
BH CV ECHO MEAS - LV V1 MAX: 79.6 CM/SEC
BH CV ECHO MEAS - LV V1 MEAN: 61.9 CM/SEC
BH CV ECHO MEAS - LV V1 VTI: 15.2 CM
BH CV ECHO MEAS - LVIDD: 5.1 CM
BH CV ECHO MEAS - LVIDS: 4 CM
BH CV ECHO MEAS - LVLD AP2: 9.2 CM
BH CV ECHO MEAS - LVLD AP4: 8.2 CM
BH CV ECHO MEAS - LVLS AP2: 7.5 CM
BH CV ECHO MEAS - LVLS AP4: 7.4 CM
BH CV ECHO MEAS - LVOT AREA (M): 4.9 CM^2
BH CV ECHO MEAS - LVOT AREA: 4.9 CM^2
BH CV ECHO MEAS - LVOT DIAM: 2.5 CM
BH CV ECHO MEAS - LVPWD: 1.3 CM
BH CV ECHO MEAS - MED PEAK E' VEL: 9.7 CM/SEC
BH CV ECHO MEAS - MV A MAX VEL: 54.8 CM/SEC
BH CV ECHO MEAS - MV DEC SLOPE: 520.5 CM/SEC^2
BH CV ECHO MEAS - MV DEC TIME: 148 SEC
BH CV ECHO MEAS - MV E MAX VEL: 83.6 CM/SEC
BH CV ECHO MEAS - MV E/A: 1.5
BH CV ECHO MEAS - MV MAX PG: 3.8 MMHG
BH CV ECHO MEAS - MV MEAN PG: 2 MMHG
BH CV ECHO MEAS - MV P1/2T MAX VEL: 96 CM/SEC
BH CV ECHO MEAS - MV P1/2T: 54 MSEC
BH CV ECHO MEAS - MV V2 MAX: 97.2 CM/SEC
BH CV ECHO MEAS - MV V2 MEAN: 60.2 CM/SEC
BH CV ECHO MEAS - MV V2 VTI: 23.1 CM
BH CV ECHO MEAS - MVA P1/2T LCG: 2.3 CM^2
BH CV ECHO MEAS - MVA(P1/2T): 4.1 CM^2
BH CV ECHO MEAS - MVA(VTI): 3.2 CM^2
BH CV ECHO MEAS - RAP SYSTOLE: 3 MMHG
BH CV ECHO MEAS - SI(AO): 107.7 ML/M^2
BH CV ECHO MEAS - SI(CUBED): 30.8 ML/M^2
BH CV ECHO MEAS - SI(LVOT): 33.4 ML/M^2
BH CV ECHO MEAS - SI(MOD-SP2): 52.9 ML/M^2
BH CV ECHO MEAS - SI(MOD-SP4): 37.2 ML/M^2
BH CV ECHO MEAS - SI(TEICH): 24.1 ML/M^2
BH CV ECHO MEAS - SV(AO): 240.4 ML
BH CV ECHO MEAS - SV(CUBED): 68.7 ML
BH CV ECHO MEAS - SV(LVOT): 74.6 ML
BH CV ECHO MEAS - SV(MOD-SP2): 118 ML
BH CV ECHO MEAS - SV(MOD-SP4): 83 ML
BH CV ECHO MEAS - SV(TEICH): 53.8 ML
BH CV ECHO MEAS - TAPSE (>1.6): 3.3 CM
BH CV ECHO MEASUREMENTS AVERAGE E/E' RATIO: 7.15
BUN SERPL-MCNC: 58 MG/DL (ref 8–23)
BUN/CREAT SERPL: 10.7 (ref 7–25)
CALCIUM SPEC-SCNC: 9.4 MG/DL (ref 8.6–10.5)
CHLORIDE SERPL-SCNC: 107 MMOL/L (ref 98–107)
CO2 SERPL-SCNC: 23.4 MMOL/L (ref 22–29)
CREAT SERPL-MCNC: 5.4 MG/DL (ref 0.76–1.27)
DEPRECATED RDW RBC AUTO: 45.2 FL (ref 37–54)
EOSINOPHIL # BLD AUTO: 0.63 10*3/MM3 (ref 0–0.4)
EOSINOPHIL NFR BLD AUTO: 13 % (ref 0.3–6.2)
ERYTHROCYTE [DISTWIDTH] IN BLOOD BY AUTOMATED COUNT: 12.9 % (ref 12.3–15.4)
GFR SERPL CREATININE-BSD FRML MDRD: 11 ML/MIN/1.73
GFR SERPL CREATININE-BSD FRML MDRD: ABNORMAL ML/MIN/{1.73_M2}
GLUCOSE BLDC GLUCOMTR-MCNC: 107 MG/DL (ref 70–130)
GLUCOSE BLDC GLUCOMTR-MCNC: 149 MG/DL (ref 70–130)
GLUCOSE BLDC GLUCOMTR-MCNC: 166 MG/DL (ref 70–130)
GLUCOSE BLDC GLUCOMTR-MCNC: 83 MG/DL (ref 70–130)
GLUCOSE SERPL-MCNC: 102 MG/DL (ref 65–99)
HBA1C MFR BLD: 5.3 % (ref 4.8–5.6)
HCT VFR BLD AUTO: 26.3 % (ref 37.5–51)
HGB BLD-MCNC: 8.8 G/DL (ref 13–17.7)
IMM GRANULOCYTES # BLD AUTO: 0.02 10*3/MM3 (ref 0–0.05)
IMM GRANULOCYTES NFR BLD AUTO: 0.4 % (ref 0–0.5)
LYMPHOCYTES # BLD AUTO: 1.21 10*3/MM3 (ref 0.7–3.1)
LYMPHOCYTES NFR BLD AUTO: 24.9 % (ref 19.6–45.3)
MAGNESIUM SERPL-MCNC: 2.2 MG/DL (ref 1.6–2.4)
MCH RBC QN AUTO: 32.5 PG (ref 26.6–33)
MCHC RBC AUTO-ENTMCNC: 33.5 G/DL (ref 31.5–35.7)
MCV RBC AUTO: 97 FL (ref 79–97)
MONOCYTES # BLD AUTO: 0.51 10*3/MM3 (ref 0.1–0.9)
MONOCYTES NFR BLD AUTO: 10.5 % (ref 5–12)
NEUTROPHILS NFR BLD AUTO: 2.46 10*3/MM3 (ref 1.7–7)
NEUTROPHILS NFR BLD AUTO: 50.8 % (ref 42.7–76)
NRBC BLD AUTO-RTO: 0 /100 WBC (ref 0–0.2)
PHOSPHATE SERPL-MCNC: 4.6 MG/DL (ref 2.5–4.5)
PLATELET # BLD AUTO: 171 10*3/MM3 (ref 140–450)
PMV BLD AUTO: 10.8 FL (ref 6–12)
POTASSIUM SERPL-SCNC: 4.7 MMOL/L (ref 3.5–5.2)
RBC # BLD AUTO: 2.71 10*6/MM3 (ref 4.14–5.8)
SODIUM SERPL-SCNC: 140 MMOL/L (ref 136–145)
WBC # BLD AUTO: 4.85 10*3/MM3 (ref 3.4–10.8)

## 2021-02-01 PROCEDURE — 97165 OT EVAL LOW COMPLEX 30 MIN: CPT

## 2021-02-01 PROCEDURE — 93306 TTE W/DOPPLER COMPLETE: CPT | Performed by: INTERNAL MEDICINE

## 2021-02-01 PROCEDURE — 93306 TTE W/DOPPLER COMPLETE: CPT

## 2021-02-01 PROCEDURE — 85025 COMPLETE CBC W/AUTO DIFF WBC: CPT | Performed by: INTERNAL MEDICINE

## 2021-02-01 PROCEDURE — 25010000002 HEPARIN (PORCINE) PER 1000 UNITS: Performed by: INTERNAL MEDICINE

## 2021-02-01 PROCEDURE — 36415 COLL VENOUS BLD VENIPUNCTURE: CPT | Performed by: NURSE PRACTITIONER

## 2021-02-01 PROCEDURE — 5A1D70Z PERFORMANCE OF URINARY FILTRATION, INTERMITTENT, LESS THAN 6 HOURS PER DAY: ICD-10-PCS | Performed by: INTERNAL MEDICINE

## 2021-02-01 PROCEDURE — 83036 HEMOGLOBIN GLYCOSYLATED A1C: CPT | Performed by: NURSE PRACTITIONER

## 2021-02-01 PROCEDURE — 80069 RENAL FUNCTION PANEL: CPT | Performed by: INTERNAL MEDICINE

## 2021-02-01 PROCEDURE — 83735 ASSAY OF MAGNESIUM: CPT | Performed by: INTERNAL MEDICINE

## 2021-02-01 PROCEDURE — 25010000002 PERFLUTREN (DEFINITY) 8.476 MG IN SODIUM CHLORIDE (PF) 0.9 % 10 ML INJECTION: Performed by: INTERNAL MEDICINE

## 2021-02-01 PROCEDURE — 82962 GLUCOSE BLOOD TEST: CPT

## 2021-02-01 PROCEDURE — 99232 SBSQ HOSP IP/OBS MODERATE 35: CPT | Performed by: INTERNAL MEDICINE

## 2021-02-01 RX ORDER — HEPARIN SODIUM 1000 [USP'U]/ML
3000 INJECTION, SOLUTION INTRAVENOUS; SUBCUTANEOUS ONCE
Status: COMPLETED | OUTPATIENT
Start: 2021-02-01 | End: 2021-02-01

## 2021-02-01 RX ADMIN — SODIUM CHLORIDE, PRESERVATIVE FREE 10 ML: 5 INJECTION INTRAVENOUS at 07:56

## 2021-02-01 RX ADMIN — APIXABAN 5 MG: 5 TABLET, FILM COATED ORAL at 17:02

## 2021-02-01 RX ADMIN — DOCUSATE SODIUM 100 MG: 100 CAPSULE, LIQUID FILLED ORAL at 20:07

## 2021-02-01 RX ADMIN — CALCITRIOL 0.25 MCG: 0.25 CAPSULE ORAL at 07:52

## 2021-02-01 RX ADMIN — CALCIUM ACETATE 1334 MG: 667 CAPSULE ORAL at 14:09

## 2021-02-01 RX ADMIN — ALLOPURINOL 100 MG: 100 TABLET ORAL at 07:52

## 2021-02-01 RX ADMIN — SODIUM CHLORIDE, PRESERVATIVE FREE 10 ML: 5 INJECTION INTRAVENOUS at 20:07

## 2021-02-01 RX ADMIN — TAMSULOSIN HYDROCHLORIDE 0.8 MG: 0.4 CAPSULE ORAL at 07:54

## 2021-02-01 RX ADMIN — CALCIUM ACETATE 1334 MG: 667 CAPSULE ORAL at 07:52

## 2021-02-01 RX ADMIN — ISOSORBIDE MONONITRATE 120 MG: 60 TABLET ORAL at 07:53

## 2021-02-01 RX ADMIN — CALCIUM ACETATE 1334 MG: 667 CAPSULE ORAL at 17:02

## 2021-02-01 RX ADMIN — PERFLUTREN 3 ML: 6.52 INJECTION, SUSPENSION INTRAVENOUS at 15:15

## 2021-02-01 RX ADMIN — ATORVASTATIN CALCIUM 40 MG: 20 TABLET, FILM COATED ORAL at 07:52

## 2021-02-01 RX ADMIN — HEPARIN SODIUM 3800 UNITS: 1000 INJECTION INTRAVENOUS; SUBCUTANEOUS at 12:40

## 2021-02-01 RX ADMIN — APIXABAN 5 MG: 5 TABLET, FILM COATED ORAL at 06:16

## 2021-02-01 NOTE — PROGRESS NOTES
LOS: 1 day   Patient Care Team:  Natanael Marshall MD as PCP - General (Pediatrics)  Gracie Conroy MD as Referring Physician (Nephrology)  Pollo Hernandez MD as Consulting Physician (Hematology and Oncology)  Ean Chavez MD as Consulting Physician (Urology)    Chief Complaint: Follow-up for atrial flutter, hypotension, syncope.    Interval History: Feels well.  He does have rates that go into the 40s while sleeping.  He remains in atrial flutter, and is asymptomatic.  He has not felt lightheaded.  He has had no chest pain or shortness of breath.    Vital Signs:  Temp:  [97.2 °F (36.2 °C)-98.4 °F (36.9 °C)] 97.2 °F (36.2 °C)  Heart Rate:  [55-70] 70  Resp:  [16-18] 18  BP: (129-179)/(72-93) 149/81    Intake/Output Summary (Last 24 hours) at 2/1/2021 1842  Last data filed at 2/1/2021 1415  Gross per 24 hour   Intake 310 ml   Output 1250 ml   Net -940 ml       Physical Exam:   General Appearance:    No acute distress, alert and oriented x4   Lungs:     Clear to auscultation bilaterally     Heart:    Irregularly irregular rhythm with normal rate.  No murmurs,   gallops, or rubs.   Abdomen:     Soft, nontender, nondistended.    Extremities:   Moves all extremities well.  No clubbing, cyanosis, or edema.     Results Review:    Results from last 7 days   Lab Units 02/01/21  0558   SODIUM mmol/L 140   POTASSIUM mmol/L 4.7   CHLORIDE mmol/L 107   CO2 mmol/L 23.4   BUN mg/dL 58*   CREATININE mg/dL 5.40*   GLUCOSE mg/dL 102*   CALCIUM mg/dL 9.4         Results from last 7 days   Lab Units 02/01/21  0558   WBC 10*3/mm3 4.85   HEMOGLOBIN g/dL 8.8*   HEMATOCRIT % 26.3*   PLATELETS 10*3/mm3 171             Results from last 7 days   Lab Units 02/01/21  0558   MAGNESIUM mg/dL 2.2           I reviewed the patient's new clinical results.        Assessment:  1.  Syncope, likely related to hypotension  2.  Intermittent hypotension (typcially morning after HD)  3.  ESRD - on HD  4.  Typical atrial flutter (new)  5.   Diabetes  6.  History of hemodynamically insignificant coarctation  7.  Splenic artery aneurysm    Plan:  -I had a long discussion with him.  His echocardiogram is unremarkable.  His blood pressure before he lost consciousness was about 70/40, and he told me that this typically occurs the morning after dialysis.  I do not think that the syncope was related to the atrial flutter at this point.    -Atrial flutter is rate controlled.  He does have rates as low as the 40s, predominantly while sleeping and in the early morning hours.  However, he is asymptomatic with regards to the atrial flutter.  I would recommend permanent discontinuation of the carvedilol at this point given the intermittent bradycardia.  He will need to be anticoagulated with Eliquis 5 mg twice a day.    -I am going to recommend a Zio patch at discharge.  Before considering an ablation, I would like to see if he has any atrial fibrillation as well.  This will also give an indication on his average heart rate while in atrial flutter, and whether or not it is paroxysmal.  If he does not have atrial fibrillation, could consider an ablation in the future in order to minimize anticoagulation.  He would only need to take anticoagulation for 1 month after a typical atrial flutter ablation.    -I discussed all of this in detail with the patient and his wife.  They were in agreement with the plan.    Gaudencio Cuellar MD  02/01/21  18:42 EST

## 2021-02-01 NOTE — THERAPY DISCHARGE NOTE
Acute Care - Occupational Therapy Discharge  [facility]    Patient Name: Donald Johnson  : 1950    MRN: [id number]                              Today's Date: 2021       Admit Date: 2021    Visit Dx:     ICD-10-CM ICD-9-CM   1. Typical atrial flutter (CMS/HCC)  I48.3 427.32   2. Bradycardia  R00.1 427.89   3. Anemia of chronic kidney failure, stage 4 (severe) (CMS/HCC)  N18.4 285.21    D63.1 585.4   4. Stage 4 chronic kidney disease (CMS/HCC)  N18.4 585.4     Patient Active Problem List   Diagnosis   • Anemia in CKD (chronic kidney disease)   • Type 2 diabetes mellitus with renal complication (CMS/HCC)   • Essential hypertension   • Gout   • Hyperlipidemia   • Aneurysm of splenic artery (CMS/HCC)   • Fatigue   • Hilar mass   • Anemia of chronic kidney failure, stage 4 (severe) (CMS/HCC)   • Normocytic anemia   • Purpura (CMS/HCC)   • Renal mass, right   • Acute blood loss anemia   • Typical atrial flutter (CMS/HCC)   • Bradycardia   • ESRD (end stage renal disease) (CMS/HCC)   • Syncope   • Patient awaiting renal transplant     Past Medical History:   Diagnosis Date   • Anemia    • Arthritis    • At risk for obstructive sleep apnea     stop bang #5   • Cancer (CMS/HCC) 2017    Left cheek, nonmelanoma   • Chronic kidney disease    • Coarctation of aorta     very mild, likely not hemodynamically significant   • DDD (degenerative disc disease), lumbosacral    • Diabetes mellitus (CMS/HCC)     Type 2   • H/O Chronic gout    • H/O Lung mass    • History of cardiac cath     2013 with luminal irregularities, normal EF, normal LVEDP   • Hyperlipidemia    • Hypertension    • Monoclonal gammopathy    • Obesity    • PAD (peripheral artery disease) (CMS/HCC)    • PONV (postoperative nausea and vomiting)    • Right kidney mass    • Splenic artery aneurysm (CMS/HCC)      Past Surgical History:   Procedure Laterality Date   • ACHILLES TENDON SURGERY     • CARDIAC CATHETERIZATION     •  CHOLECYSTECTOMY     • EPIDURAL BLOCK     • MOHS SURGERY Left 07/20/2017    Cheek   • NEPHRECTOMY PARTIAL Right 1/3/2020    Procedure: RIGHT LAPAROSCOPIC PARTIAL  NEPHRECTOMY;  Surgeon: Ean Chavez MD;  Location: [facility] OR;  Service: Urology   • SKIN CANCER EXCISION  2014    Top of head x2   • TONSILLECTOMY       General Information     Row Name 02/01/21 1447          OT Time and Intention    Document Type  evaluation;discharge evaluation/summary  -LE     Mode of Treatment  individual therapy;occupational therapy  -     Row Name 02/01/21 1447          General Information    Patient Profile Reviewed  yes  -LE     Prior Level of Function  independent:;ADL's;driving;work h/o low BP and tingling in fingers after dialysis  -LE     Row Name 02/01/21 1447          Living Environment    Lives With  spouse  -     Row Name 02/01/21 1447          Cognition    Orientation Status (Cognition)  oriented x 4  -LE       User Key  (r) = Recorded By, (t) = Taken By, (c) = Cosigned By    Initials Name Provider Type    Swetha Ortega OTR Occupational Therapist        Mobility/ADL's     Row Name 02/01/21 1448          Bed Mobility    Bed Mobility  sit-supine;supine-sit  -LE     Supine-Sit [place] (Bed Mobility)  independent  -LE     Sit-Supine [place] (Bed Mobility)  independent  -LE     Row Name 02/01/21 1448          Transfers    Transfers  sit-stand transfer;toilet transfer  -LE     Sit-Stand [place] (Transfers)  independent  -LE     [place] Level (Toilet Transfer)  standby assist  -     Row Name 02/01/21 1448          Toilet Transfer    Type (Toilet Transfer)  stand pivot/stand step  -     Row Name 02/01/21 1448          Functional Mobility    Functional Mobility- Ind. Level  standby assist slower speed.  at/near baseline per pt and wife. recently back from dialysis  -LE     Functional Mobility-Distance (Feet)  -- to/from bathroom  -LE     Row Name 02/01/21 1448          Activities of Daily  Living    BADL Assessment/Intervention  lower body dressing;toileting;feeding  -LE     Row Name 02/01/21 1448          Lower Body Dressing Assessment/Training    [place] Level (Lower Body Dressing)  doff;don;socks;set up  -LE     Position (Lower Body Dressing)  edge of bed sitting  -     Row Name 02/01/21 1448          Toileting Assessment/Training    Comment (Toileting)  using urinal with set up  -     Row Name 02/01/21 1448          Self-Feeding Assessment/Training    Comment (Feeding)  denies dificulty, just finished lunch  -LE       User Key  (r) = Recorded By, (t) = Taken By, (c) = Cosigned By    Initials Name Provider Type    Swetha Ortega OTR Occupational Therapist        Obj/Interventions     Row Name 02/01/21 1450          Sensory Assessment (Somatosensory)    Sensory Assessment (Somatosensory)  -- h/o tingling in fingers after dialysis.   not present today  -[facility] Name 02/01/21 1450          Range of Motion Comprehensive    Comment, General Range of Motion  B  UE 7/8 AROM  -[facility] Name 02/01/21 1450          Strength Comprehensive (MMT)    Comment, General Manual Muscle Testing (MMT) Assessment  B UE 4/5  -[facility] Name 02/01/21 1450          Balance    Balance Assessment  sitting static balance;standing static balance  -LE     Static Sitting Balance  WFL  -LE     Static Standing Balance  WFL  -LE     Comment, Balance  pt and wife deny concern for ambulation, balance or ADL  -LE       User Key  (r) = Recorded By, (t) = Taken By, (c) = Cosigned By    Initials Name Provider Type    Swetha Ortega OTR Occupational Therapist        Goals/Plan     Row Name 02/01/21 1453          Transfer Goal 1 (OT)    Activity/Assistive Device (Transfer Goal 1, OT)  sit-to-stand/stand-to-sit;toilet  -LE     [place] Level/Cues Needed (Transfer Goal 1, OT)  standby assist  -LE     Time Frame (Transfer Goal 1, OT)  1 day  -LE     Progress/Outcome (Transfer Goal 1, OT)  goal met  -LE       User Key   (r) = Recorded By, (t) = Taken By, (c) = Cosigned By    Initials Name Provider Type    Swetha Ortega, OTR Occupational Therapist        Clinical Impression     Row Name 02/01/21 1451          Pain Assessment    Additional Documentation  Pain Scale: Numbers Pre/Post-Treatment (Group)  -[facility] Name 02/01/21 1451          Pain Scale: Numbers Pre/Post-Treatment    Pretreatment Pain Rating  0/10 - no pain  -[facility] Name 02/01/21 1451          Plan of Care Review    Plan of Care Reviewed With  patient;spouse  -LE     Outcome Summary  Pt admit with fall after dialysis.  Pt lives with wife and is active and independent.  Pt and wife feel pt is at/near baseline and no assist needed during ADL to get OOB, ambulate to bahtroom and select ADL tasks. No further skilled acute care OT needs.  -[facility] Name 02/01/21 1451          Therapy Assessment/Plan (OT)    Criteria for Skilled Therapeutic Interventions Met (OT)  no  -LE     Therapy Frequency (OT)  evaluation only  -[facility] Name 02/01/21 1451          Therapy Plan Review/Discharge Plan (OT)    Anticipated Discharge Disposition (OT)  home does not shower due to dialysis catheter  -[facility] Name 02/01/21 1451          Vital Signs    O2 Delivery Intra Treatment  room air  -LE     O2 Delivery Post Treatment  room air  -LE     Pre Patient Position  Supine  -LE     Intra Patient Position  Standing  -LE     Post Patient Position  Supine  -LE     Activity Duration  -- no overt SOA or c/o dizziness when up  -LE     Row Name 02/01/21 1451          Positioning and Restraints    Pre-Treatment Position  in bed  -LE     Post Treatment Position  bed  -LE     In Bed  fowlers;call light within reach;encouraged to call for assist;with family/caregiver with echo staff  -LE       User Key  (r) = Recorded By, (t) = Taken By, (c) = Cosigned By    Initials Name Provider Type    Swetha Ortega, OTR Occupational Therapist        Outcome Measures     Row Name 02/01/21 1453          How  much help from another is currently needed...    Putting on and taking off regular lower body clothing?  3  -LE     Bathing (including washing, rinsing, and drying)  3  -LE     Toileting (which includes using toilet bed pan or urinal)  3  -LE     Putting on and taking off regular upper body clothing  3  -LE     Taking care of personal grooming (such as brushing teeth)  3  -LE     Eating meals  4  -LE     AM-PAC 6 Clicks Score (OT)  19  -LE     Row Name 02/01/21 1453          Functional Assessment    Outcome Measure Options  AM-PAC 6 Clicks Daily Activity (OT)  -LE       User Key  (r) = Recorded By, (t) = Taken By, (c) = Cosigned By    Initials Name Provider Type    LE Swetha Peña OTR Occupational Therapist        Occupational Therapy Education                 Title: PT OT SLP Therapies (Done)     Topic: Occupational Therapy (Done)     Point: ADL training (Done)     Description:   Instruct learner(s) on proper safety adaptation and remediation techniques during self care or transfers.   Instruct in proper use of assistive devices.              Learning Progress Summary           Patient Acceptance, E, VU by ERNESTINA at 2/1/2021 1453    Comment: role of OT,plan of care   Significant Other Acceptance, E, VU by ERNESTINA at 2/1/2021 1453    Comment: role of OT,plan of care                               User Key     Initials Effective Dates Name Provider Type Discipline    ERNESTINA 06/08/18 -  Swetha Peña OTR Occupational Therapist OT              OT Recommendation and Plan  Retired Outcome Summary/Treatment Plan (OT)  Anticipated Discharge Disposition (OT): home  Therapy Frequency (OT): evaluation only  Plan of Care Review  Plan of Care Reviewed With: patient, spouse  Outcome Summary: Pt admit with fall after dialysis.  Pt lives with wife and is active and independent.  Pt and wife feel pt is at/near baseline and no assist needed during ADL to get OOB, ambulate to bahtroom and select ADL tasks. No further skilled acute care OT  needs.  Plan of Care Reviewed With: patient, spouse  Outcome Summary: Pt admit with fall after dialysis.  Pt lives with wife and is active and independent.  Pt and wife feel pt is at/near baseline and no assist needed during ADL to get OOB, ambulate to bahtroom and select ADL tasks. No further skilled acute care OT needs.     Time Calculation:   Time Calculation- OT     Row Name 02/01/21 1454 02/01/21 0904          Time Calculation- OT    OT Start Time  1431  -LE  --     OT Stop Time  1443  -LE  --     OT Time Calculation (min)  12 min  -LE  --     OT Received On  02/01/21  -LE  --     OT - Next Appointment  --  02/02/21  -ERNESTINA       User Key  (r) = Recorded By, (t) = Taken By, (c) = Cosigned By    Initials Name Provider Type    Swetha Ortega OTR Occupational Therapist        Therapy Charges for Today     Code Description Service Date Service Provider Modifiers Qty    04923222044  OT EVAL LOW COMPLEXITY 2 2/1/2021 Swetha Peña OTR GO 1               LAUREANO Alvarado  2/1/2021

## 2021-02-01 NOTE — PLAN OF CARE
Goal Outcome Evaluation:  Plan of Care Reviewed With: patient  Progress: no change  Outcome Summary: Pt admitted today for syncopial episode, found to be in Aflutter on admit, eliquis started, dialysis orders for tomorrow, pt plan to have kidney transplant next friday but on hold for now. cl

## 2021-02-01 NOTE — PROGRESS NOTES
"   LOS: 1 day    Patient Care Team:  Natanael Marshall MD as PCP - General (Pediatrics)  Gracie Conroy MD as Referring Physician (Nephrology)  Pollo Hernandez MD as Consulting Physician (Hematology and Oncology)  Ean Chavez MD as Consulting Physician (Urology)    Chief Complaint:    Chief Complaint   Patient presents with   • Dizziness   • Fall     Follow-up end-stage renal disease  Subjective     Interval History:   Patient is feeling the same, denies any chest pain or shortness of air, no orthopnea or PND, no lightheadedness, no dysuria or gross hematuria.  No nausea or vomiting    Review of Systems:   As noted above    Objective     Vital Signs  Temp:  [97.6 °F (36.4 °C)-98.8 °F (37.1 °C)] 98.4 °F (36.9 °C)  Heart Rate:  [55-63] 58  Resp:  [18-20] 18  BP: (129-185)/(72-88) 171/87    Flowsheet Rows      First Filed Value   Admission Height  185.4 cm (73\") Documented at 01/31/2021 0757   Admission Weight  96.2 kg (212 lb) Documented at 01/31/2021 0757          I/O this shift:  In: -   Out: 200 [Urine:200]  I/O last 3 completed shifts:  In: 440 [P.O.:440]  Out: 450 [Urine:450]    Intake/Output Summary (Last 24 hours) at 2/1/2021 0849  Last data filed at 2/1/2021 0710  Gross per 24 hour   Intake 440 ml   Output 650 ml   Net -210 ml       Physical Exam:  General Appearance: alert, oriented x 3, no acute distress,   Skin: warm and dry  HEENT: pupils round and reactive to light, oral mucosa normal, nonicteric sclera  Neck: No JVD, tunneled dialysis catheter in the right IJ  Lungs: CTA, unlabored breathing effort  Heart: Regular rate and rhythm, no rub  Abdomen: soft, nontender, normoactive bowels  : no palpable bladder,  Extremities: no edema, cyanosis or clubbing  Neuro: normal speech and mental status       Results Review:    Results from last 7 days   Lab Units 02/01/21  0558 01/31/21  0811   SODIUM mmol/L 140 139   POTASSIUM mmol/L 4.7 4.4   CHLORIDE mmol/L 107 104   CO2 mmol/L 23.4 24.3   BUN " mg/dL 58* 52*   CREATININE mg/dL 5.40* 4.81*   CALCIUM mg/dL 9.4 9.3   BILIRUBIN mg/dL  --  0.3   ALK PHOS U/L  --  110   ALT (SGPT) U/L  --  24   AST (SGOT) U/L  --  16   GLUCOSE mg/dL 102* 215*       Estimated Creatinine Clearance: 16.2 mL/min (A) (by C-G formula based on SCr of 5.4 mg/dL (H)).    Results from last 7 days   Lab Units 02/01/21  0558   MAGNESIUM mg/dL 2.2   PHOSPHORUS mg/dL 4.6*             Results from last 7 days   Lab Units 02/01/21  0558 01/31/21  0811   WBC 10*3/mm3 4.85 4.83   HEMOGLOBIN g/dL 8.8* 9.1*   PLATELETS 10*3/mm3 171 173               Imaging Results (Last 24 Hours)     Procedure Component Value Units Date/Time    CT Head Without Contrast [[id number]] Collected: 01/31/21 0849     Updated: 01/31/21 0856    Narrative:      CT HEAD WO CONTRAST-     INDICATIONS: Head injury     TECHNIQUE: Radiation dose reduction techniques were utilized, including  automated exposure control and exposure modulation based on body size.  Noncontrast head CT     COMPARISON: None available     FINDINGS:           No acute intracranial hemorrhage, midline shift or mass effect. No acute  territorial infarct is identified.     Mild periventricular hypodensities suggest chronic small vessel ischemic  change in a patient this age.     Arterial calcifications are seen at the base of the brain.     Asymmetry of the lateral ventricles, right larger than left, could be  developmental in nature. Ventricles, cisterns, cerebral sulci are  otherwise unremarkable for patient age.     Mild paranasal sinus mucosal thickening, with likely mucous retention  cysts in the maxillary sinuses. Small calcifications are seen along the  posterior margin of the right globe. The visualized paranasal sinuses,  orbits, mastoid air cells are otherwise unremarkable.          Impression:         No acute intracranial hemorrhage or hydrocephalus. If there is further  clinical concern, MRI could be considered for further evaluation.     This  report was finalized on 1/31/2021 8:53 AM by Dr. Joao Barron M.D.       XR Ribs Bilateral 4+ View With PA Chest [[id number]] Collected: 01/31/21 0837     Updated: 01/31/21 0851    Narrative:      XR RIBS BILATERAL 4+ VW W PA CHEST-     INDICATIONS: Trauma     TECHNIQUE: Frontal view of the chest, 8 views including the bilateral  ribs     COMPARISON: 11/21/2013     FINDINGS:     Right-sided dual-lumen central venous catheter extends to the cavoatrial  junction region. The heart size is borderline. Aorta is tortuous.  Calcified mediastinal, hilar lymph nodes redemonstrated. There appear to  be some fibrotic changes at the lung bases, as well as old granulomatous  disease. No pleural effusion or pneumothorax. Previously demonstrated  calcified splenic arterial aneurysm is also apparent on this exam.     No acute rib fracture is identified.             Impression:         No acute rib fracture is identified. Follow up as indications persist.     This report was finalized on 1/31/2021 8:48 AM by Dr. Joao Barron M.D.           allopurinol, 100 mg, Oral, Every Other Day  apixaban, 5 mg, Oral, Q12H  atorvastatin, 40 mg, Oral, Daily  calcitriol, 0.25 mcg, Oral, Daily  calcium acetate, 1,334 mg, Oral, TID With Meals  insulin lispro, 0-7 Units, Subcutaneous, TID AC  isosorbide mononitrate, 120 mg, Oral, Daily  sodium chloride, 10 mL, Intravenous, Q12H  tamsulosin, 0.8 mg, Oral, Daily           Medication Review:   Current Facility-Administered Medications   Medication Dose Route Frequency Provider Last Rate Last Admin   • acetaminophen (TYLENOL) tablet 650 mg  650 mg Oral Q4H PRN Brett Merino APRN       • allopurinol (ZYLOPRIM) tablet 100 mg  100 mg Oral Every Other Day Laron Davison MD   100 mg at 02/01/21 0752   • apixaban (ELIQUIS) tablet 5 mg  5 mg Oral Q12H Ulices Puga MD   5 mg at 02/01/21 0616   • atorvastatin (LIPITOR) tablet 40 mg  40 mg Oral Daily Laron Davison MD   40 mg at  02/01/21 0752   • calcitriol (ROCALTROL) capsule 0.25 mcg  0.25 mcg Oral Daily Laron Davison MD   0.25 mcg at 02/01/21 0752   • calcium acetate (PHOS BINDER)) capsule 1,334 mg  1,334 mg Oral TID With Meals Laron Davison MD   1,334 mg at 02/01/21 0752   • dextrose (D50W) 25 g/ 50mL Intravenous Solution 25 g  25 g Intravenous Q15 Min PRN Brett Merino APRN       • dextrose (GLUTOSE) oral gel 15 g  15 g Oral Q15 Min PRN Brett Merino APRN       • docusate sodium (COLACE) capsule 100 mg  100 mg Oral BID PRN Laron Davison MD       • glucagon (human recombinant) (GLUCAGEN DIAGNOSTIC) injection 1 mg  1 mg Subcutaneous Q15 Min PRN Brett Merino APRN       • insulin lispro (humaLOG, ADMELOG) injection 0-7 Units  0-7 Units Subcutaneous TID AC Brett Merino APRN       • isosorbide mononitrate (IMDUR) 24 hr tablet 120 mg  120 mg Oral Daily Laron Davison MD   120 mg at 02/01/21 0753   • ondansetron (ZOFRAN) tablet 4 mg  4 mg Oral Q6H PRN Brett Merino APRN        Or   • ondansetron (ZOFRAN) injection 4 mg  4 mg Intravenous Q6H PRN Brett Merino APRN       • sodium chloride 0.9 % flush 10 mL  10 mL Intravenous Q12H Brett Merino APRN   10 mL at 02/01/21 0756   • sodium chloride 0.9 % flush 10 mL  10 mL Intravenous PRN Brett Merino APRN       • tamsulosin (FLOMAX) 24 hr capsule 0.8 mg  0.8 mg Oral Daily Laron Davison MD   0.8 mg at 02/01/21 0754       Assessment/Plan   1.  End-stage renal disease second diabetic and hypertensive glomerulosclerosis and loss of renal mass with prior partial right nephrectomy, on maintenance hemodialysis every Monday, Wednesday and Friday.  His electrolytes and volume status within acceptable range  2.  Syncopal episode associated with hypotension but also he has newly diagnosed atrial flutter, cardiology evaluation is in progress  3.  Dyslipidemia  4.  Diabetes mellitus type 2  5.  History of splenic artery aneurysm and hemodynamically  insignificant coarctation of the aorta  6.  Anemia of chronic kidney disease hemoglobin is 8.8 treated with long acting [place] and iron infusion  7.  History of hypertension has been well controlled with decreasing medication his hydralazine was discontinued recently and his carvedilol dose was decreased.      Plan:  1.  Hemodialysis today  2.  Surveillance labs                Yevgeniy Mccann MD  02/01/21  08:49 EST

## 2021-02-01 NOTE — SIGNIFICANT NOTE
Pt checked on by PT x2 and is off the floor for Cardio test. Pt was in HD am. PT will check on tomorrow.

## 2021-02-01 NOTE — PLAN OF CARE
Goal Outcome Evaluation:  Plan of Care Reviewed With: patient  Progress: no change  Outcome Summary: Pt admitted for syncope and aflutter.Heart rate in 40's - 60's,  No syncope, ortho blood pressures noted. For HD today. Safety maintained.

## 2021-02-01 NOTE — PLAN OF CARE
Goal Outcome Evaluation:  Plan of Care Reviewed With: patient     Outcome Summary: Went for hemodialysis today. Safety maintained, will continue to monitor. Bedside Echo complete.  Holter monitor ordered, to be placed at D/C.

## 2021-02-01 NOTE — PROGRESS NOTES
Name: Donald Johnson ADMIT: 2021   : 1950  PCP: Natanael Marshall MD    MRN: [id number] LOS: 1 days   AGE/SEX: 70 y.o. male  ROOM: [facility]   Subjective   Chief Complaint   Patient presents with   • Dizziness   • Fall      Saw in HD. No CP SOA NVD. Feeling ok. Discussed flutter with him and continued workup. Also discussed stroke risk with afib/flutter and rationale for anticoagulation.    Objective   Vital Signs  Temp:  [97.6 °F (36.4 °C)-98.8 °F (37.1 °C)] 98.2 °F (36.8 °C)  Heart Rate:  [55-62] 59  Resp:  [16-20] 16  BP: (129-185)/(72-88) 179/81  SpO2:  [93 %-100 %] 98 %  on   ;   Device (Oxygen Therapy): room air  Body mass index is 28.76 kg/m².    Physical Exam  Vitals signs and nursing note reviewed.   Constitutional:       General: He is not in acute distress.  HENT:      Head: Normocephalic and atraumatic.   Eyes:      General: No scleral icterus.     Extraocular Movements: Extraocular movements intact.   Neck:      Musculoskeletal: Neck supple. No muscular tenderness.   Cardiovascular:      Rate and Rhythm: Normal rate. Rhythm irregular.      Pulses: Normal pulses.   Pulmonary:      Effort: Pulmonary effort is normal.      Breath sounds: No wheezing.   Abdominal:      General: There is no distension.      Palpations: Abdomen is soft.      Tenderness: There is no abdominal tenderness. There is no guarding or rebound.   Musculoskeletal:         General: No swelling or tenderness.   Skin:     General: Skin is warm and dry.   Neurological:      Mental Status: He is alert. Mental status is at baseline.   Psychiatric:         Mood and Affect: Mood normal.         Behavior: Behavior normal.         Results Review:       I reviewed the patient's new clinical results.     I reviewed telemetry/EKG results, flutter, ventricular rate 50s on tele.      Results from last 7 days   Lab Units 21  0558 21  0811   WBC 10*3/mm3 4.85 4.83   HEMOGLOBIN g/dL 8.8* 9.1*   PLATELETS 10*3/mm3 171 173      Results from last 7 days   Lab Units 02/01/21  0558 01/31/21  0811   SODIUM mmol/L 140 139   POTASSIUM mmol/L 4.7 4.4   CHLORIDE mmol/L 107 104   CO2 mmol/L 23.4 24.3   BUN mg/dL 58* 52*   CREATININE mg/dL 5.40* 4.81*   GLUCOSE mg/dL 102* 215*   Estimated Creatinine Clearance: 16.2 mL/min (A) (by C-G formula based on SCr of 5.4 mg/dL (H)).  Results from last 7 days   Lab Units 02/01/21  0558 01/31/21  0811   CALCIUM mg/dL 9.4 9.3   ALBUMIN g/dL 3.50 4.10   MAGNESIUM mg/dL 2.2  --    PHOSPHORUS mg/dL 4.6*  --           allopurinol, 100 mg, Oral, Every Other Day  apixaban, 5 mg, Oral, Q12H  atorvastatin, 40 mg, Oral, Daily  calcitriol, 0.25 mcg, Oral, Daily  calcium acetate, 1,334 mg, Oral, TID With Meals  insulin lispro, 0-7 Units, Subcutaneous, TID AC  isosorbide mononitrate, 120 mg, Oral, Daily  sodium chloride, 10 mL, Intravenous, Q12H  tamsulosin, 0.8 mg, Oral, Daily       Diet Regular; Cardiac, Renal, Consistent Carbohydrate    Assessment/Plan      Active Hospital Problems    Diagnosis  POA   • **Typical atrial flutter (CMS/[facility]) [I48.3]  Yes   • Bradycardia [R00.1]  Yes   • ESRD (end stage renal disease) (CMS/[facility]) [N18.6]  Yes   • Syncope [R55]  Yes   • Patient awaiting renal transplant [Z76.82]  Not Applicable   • Essential hypertension [I10]  Yes   • Type 2 diabetes mellitus with renal complication (CMS/[facility]) [E11.29]  Yes   • Anemia in CKD (chronic kidney disease) [N18.9, D63.1]  Yes      Resolved Hospital Problems   No resolved problems to display.       · Atrial Flutter: New Dx. Echo planned. Ventricular rate not elevated. On Eliquis now. Cardiology following.  · Syncope: Lasix held. Has issues with hypotension after HD. Monitoring.  · ESRD/Transplant Candidate: Nephrology following.  · DM2: A1c 5.3. Previously 6.0. On amaryl as outpatient. Considering upcoming transplant, will defer to transplant team for consideration of titration/alternative therapy as outpatient. Holding sulfonylurea while here and  using low dose insulin as needed.  · Anemia Chronic Disease  · Disposition: TBD/1-2 days depending on cardiac workup    Laron Davison MD  [facility]  02/01/21  10:43 EST    Dictated portions using Dragon dictation software.    During the entire encounter, I was wearing recommended PPE including face mask and eye protection. Hand sanitization was performed prior to entering room and upon exit.

## 2021-02-01 NOTE — PROGRESS NOTES
Pharmacy Note: Eliquis  Patient was counseled over the room telephone on Eliquis including the followin) Eliquis's indication, mechanism of action, brand/generic names, and dosing  2) Enforced the importance of taking Eliquis as instructed every day and that it is a twice a day medication.  3) Explained possible side effects of Eliquis therapy, including increased risk of bleeding, s/sx of bleeding, and s/sx of any additional clots/PE/CVA.   4) Emphasized the importance of going to the emergency room if any of the following occur: Falling and hitting your head; noticing bright red blood in urine or dark/tarry stools; vomiting up blood or vomit has a coffee-ground like texture; coughing up blood  5) Discussed the importance of speaking with physician/pharmacist when starting any new medications to check for drug interactions with Eliquis  6) Discussed the importance of informing any surgeon or proceduralist that you are on Eliquis and may need to go off prior to the procedure (including spinal/epidural procedures)  7) Discussed what to do about a missed dose (Take as soon as you remember and if it is closer to the time for your next dose, skip the missed dose and go back to your normal time; DO NOT double up doses)      Patient expressed understanding and had no further questions.    Thanks, Rolando Huff, PharmD, BCPS

## 2021-02-01 NOTE — PLAN OF CARE
Goal Outcome Evaluation:  Plan of Care Reviewed With: patient, spouse     Outcome Summary: Pt admit with fall after dialysis.  Pt lives with wife and is active and independent.  Pt and wife feel pt is at/near baseline and no assist needed during ADL to get OOB, ambulate to bahtroom and select ADL tasks. No further skilled acute care OT needs.        Patient was placed in a face mask during this therapy encounter. Therapist used appropriate personal protective equipment including surgical mask, eye shield and gloves during the entire therapy session. Hand hygiene was completed before and after therapy session. Patient is not in enhanced droplet precautions.  wife present and masked

## 2021-02-01 NOTE — PROGRESS NOTES
Discharge Planning Assessment  [facility]     Patient Name: Donald Johnson  MRN: [id number]  Today's Date: 2/1/2021    Admit Date: 1/31/2021    Discharge Needs Assessment     Row Name 02/01/21 1729       Living Environment    Lives With  spouse    Name(s) of Who Lives With Patient  wife/Chary    Current Living Arrangements  home/apartment/condo    Primary Care Provided by  self    Provides Primary Care For  no one    Family Caregiver if Needed  spouse    Quality of Family Relationships  helpful;involved;supportive    Able to Return to Prior Arrangements  yes       Resource/Environmental Concerns    Resource/Environmental Concerns  none    Transportation Concerns  car, none       Transition Planning    Patient/Family Anticipates Transition to  home with family    Patient/Family Anticipated Services at Transition  none    Transportation Anticipated  car, drives self       Discharge Needs Assessment    Concerns to be Addressed  no discharge needs identified    Equipment Needed After Discharge  none    Provided Post Acute Provider List?  N/A    N/A Provider List Comment  no dc needs at this time    Provided Post Acute Provider Quality & Resource List?  N/A    N/A Quality & Resource List Comment  no dc needs at this time        Discharge Plan     Row Name 02/01/21 1730       Plan    Patient/Family in Agreement with Plan  yes    Plan Comments  CCP met with patient at bedside. Introduced self, explained role, and verified face sheet. Patient sees Natanael Marshall for primary care and fills prescriptions at [facility] or [facility] on [address]. He wishes to enroll in meds to beds. He lives at home with his wife and is IADL. He drives. He currently has no DME equipment at home and has no needs. He denies any HH or SNF history. He plans to drive himself home at discharge. He denies any discharge planning needs at this time. CCP to continue to follow for cost of jason at [place]. Fátima Sepulveda RN/CCP    Row Name 02/01/21 7319        Plan    Plan  Home via self when medically ready        Continued Care and Services - Admitted Since 1/31/2021    Coordination has not been started for this encounter.         Demographic Summary     Row Name 02/01/21 172       General Information    Admission Type  inpatient    Arrived From  home    Required Notices Provided  Important Message from Medicare    Preferred Language  English        Functional Status     Row Name 02/01/21 1727       Functional Status    Usual Activity Tolerance  good    Current Activity Tolerance  moderate       Functional Status, IADL    Medications  independent    Meal Preparation  independent    Housekeeping  independent    Laundry  independent    Shopping  independent        Psychosocial    No documentation.       Abuse/Neglect    No documentation.       Legal    No documentation.       Substance Abuse    No documentation.       Patient Forms    No documentation.           Angelique Sepulveda

## 2021-02-02 ENCOUNTER — APPOINTMENT (OUTPATIENT)
Dept: CARDIOLOGY | Facility: HOSPITAL | Age: 71
End: 2021-02-02

## 2021-02-02 VITALS
OXYGEN SATURATION: 98 % | TEMPERATURE: 98.9 F | WEIGHT: 218 LBS | HEIGHT: 73 IN | BODY MASS INDEX: 28.89 KG/M2 | DIASTOLIC BLOOD PRESSURE: 80 MMHG | HEART RATE: 60 BPM | RESPIRATION RATE: 18 BRPM | SYSTOLIC BLOOD PRESSURE: 158 MMHG

## 2021-02-02 LAB
ALBUMIN SERPL-MCNC: 3.4 G/DL (ref 3.5–5.2)
ANION GAP SERPL CALCULATED.3IONS-SCNC: 7.8 MMOL/L (ref 5–15)
BASOPHILS # BLD AUTO: 0.03 10*3/MM3 (ref 0–0.2)
BASOPHILS NFR BLD AUTO: 0.6 % (ref 0–1.5)
BUN SERPL-MCNC: 35 MG/DL (ref 8–23)
BUN/CREAT SERPL: 8.5 (ref 7–25)
CALCIUM SPEC-SCNC: 9.1 MG/DL (ref 8.6–10.5)
CHLORIDE SERPL-SCNC: 103 MMOL/L (ref 98–107)
CO2 SERPL-SCNC: 26.2 MMOL/L (ref 22–29)
CREAT SERPL-MCNC: 4.12 MG/DL (ref 0.76–1.27)
DEPRECATED RDW RBC AUTO: 46.7 FL (ref 37–54)
EOSINOPHIL # BLD AUTO: 0.6 10*3/MM3 (ref 0–0.4)
EOSINOPHIL NFR BLD AUTO: 12 % (ref 0.3–6.2)
ERYTHROCYTE [DISTWIDTH] IN BLOOD BY AUTOMATED COUNT: 13 % (ref 12.3–15.4)
GFR SERPL CREATININE-BSD FRML MDRD: 15 ML/MIN/1.73
GLUCOSE BLDC GLUCOMTR-MCNC: 115 MG/DL (ref 70–130)
GLUCOSE SERPL-MCNC: 106 MG/DL (ref 65–99)
HCT VFR BLD AUTO: 27.5 % (ref 37.5–51)
HGB BLD-MCNC: 9.2 G/DL (ref 13–17.7)
IMM GRANULOCYTES # BLD AUTO: 0.01 10*3/MM3 (ref 0–0.05)
IMM GRANULOCYTES NFR BLD AUTO: 0.2 % (ref 0–0.5)
LYMPHOCYTES # BLD AUTO: 1.09 10*3/MM3 (ref 0.7–3.1)
LYMPHOCYTES NFR BLD AUTO: 21.8 % (ref 19.6–45.3)
MCH RBC QN AUTO: 32.7 PG (ref 26.6–33)
MCHC RBC AUTO-ENTMCNC: 33.5 G/DL (ref 31.5–35.7)
MCV RBC AUTO: 97.9 FL (ref 79–97)
MONOCYTES # BLD AUTO: 0.57 10*3/MM3 (ref 0.1–0.9)
MONOCYTES NFR BLD AUTO: 11.4 % (ref 5–12)
NEUTROPHILS NFR BLD AUTO: 2.7 10*3/MM3 (ref 1.7–7)
NEUTROPHILS NFR BLD AUTO: 54 % (ref 42.7–76)
NRBC BLD AUTO-RTO: 0 /100 WBC (ref 0–0.2)
PHOSPHATE SERPL-MCNC: 4.1 MG/DL (ref 2.5–4.5)
PLATELET # BLD AUTO: 164 10*3/MM3 (ref 140–450)
PMV BLD AUTO: 10.8 FL (ref 6–12)
POTASSIUM SERPL-SCNC: 4.2 MMOL/L (ref 3.5–5.2)
RBC # BLD AUTO: 2.81 10*6/MM3 (ref 4.14–5.8)
SODIUM SERPL-SCNC: 137 MMOL/L (ref 136–145)
WBC # BLD AUTO: 5 10*3/MM3 (ref 3.4–10.8)

## 2021-02-02 PROCEDURE — 82962 GLUCOSE BLOOD TEST: CPT

## 2021-02-02 PROCEDURE — 93246 EXT ECG>7D<15D RECORDING: CPT

## 2021-02-02 PROCEDURE — 99232 SBSQ HOSP IP/OBS MODERATE 35: CPT | Performed by: INTERNAL MEDICINE

## 2021-02-02 PROCEDURE — 85025 COMPLETE CBC W/AUTO DIFF WBC: CPT | Performed by: INTERNAL MEDICINE

## 2021-02-02 PROCEDURE — 80069 RENAL FUNCTION PANEL: CPT | Performed by: INTERNAL MEDICINE

## 2021-02-02 RX ADMIN — ISOSORBIDE MONONITRATE 120 MG: 60 TABLET ORAL at 10:07

## 2021-02-02 RX ADMIN — SODIUM CHLORIDE, PRESERVATIVE FREE 10 ML: 5 INJECTION INTRAVENOUS at 10:08

## 2021-02-02 RX ADMIN — CALCIUM ACETATE 1334 MG: 667 CAPSULE ORAL at 11:30

## 2021-02-02 RX ADMIN — CALCITRIOL 0.25 MCG: 0.25 CAPSULE ORAL at 10:08

## 2021-02-02 RX ADMIN — APIXABAN 5 MG: 5 TABLET, FILM COATED ORAL at 06:57

## 2021-02-02 RX ADMIN — ATORVASTATIN CALCIUM 40 MG: 20 TABLET, FILM COATED ORAL at 10:08

## 2021-02-02 RX ADMIN — TAMSULOSIN HYDROCHLORIDE 0.8 MG: 0.4 CAPSULE ORAL at 10:07

## 2021-02-02 NOTE — PROGRESS NOTES
Case Management Discharge Note      Final Note: Home via self with no needs    Provided Post Acute Provider List?: N/A  N/A Provider List Comment: no dc needs at this time  Provided Post Acute Provider Quality & Resource List?: N/A  N/A Quality & Resource List Comment: no dc needs at this time    Selected Continued Care - Discharged on 2/2/2021 Admission date: 1/31/2021 - Discharge disposition: Home or Self Care    Destination    No services have been selected for the patient.              Durable Medical Equipment    No services have been selected for the patient.              Dialysis/Infusion    No services have been selected for the patient.              Home Medical Care    No services have been selected for the patient.              Therapy    No services have been selected for the patient.              Community Resources    No services have been selected for the patient.                  Transportation Services  Private: Car    Final Discharge Disposition Code: 01 - home or self-care

## 2021-02-02 NOTE — PROGRESS NOTES
"   LOS: 2 days    Patient Care Team:  Natanael Marshall MD as PCP - General (Pediatrics)  Gracie Conroy MD as Referring Physician (Nephrology)  Pollo Hernandez MD as Consulting Physician (Hematology and Oncology)  Ean Chavez MD as Consulting Physician (Urology)    Chief Complaint:    Chief Complaint   Patient presents with   • Dizziness   • Fall     Follow-up end-stage renal disease  Subjective     Interval History:   Patient is feeling the same, denies any chest pain or shortness of air, no orthopnea or PND, no lightheadedness, no dysuria or gross hematuria.  No nausea or vomiting, he has been bradycardic and his carvedilol was discontinued and he is currently anticoagulated, he will be having a monitoring device for 2 weeks and a decision will be made about cardioversion.  The patient had dialysis yesterday without any difficulties.    Review of Systems:   As noted above    Objective     Vital Signs  Temp:  [97.2 °F (36.2 °C)-98.9 °F (37.2 °C)] 98.9 °F (37.2 °C)  Heart Rate:  [58-70] 60  Resp:  [18] 18  BP: (149-167)/(79-93) 158/80    Flowsheet Rows      First Filed Value   Admission Height  185.4 cm (73\") Documented at 01/31/2021 0757   Admission Weight  96.2 kg (212 lb) Documented at 01/31/2021 0757          I/O this shift:  In: -   Out: 375 [Urine:375]  I/O last 3 completed shifts:  In: 430 [P.O.:430]  Out: 2575 [Urine:1975; Other:600]    Intake/Output Summary (Last 24 hours) at 2/2/2021 0846  Last data filed at 2/2/2021 0749  Gross per 24 hour   Intake 220 ml   Output 2300 ml   Net -2080 ml       Physical Exam:  General Appearance: alert, oriented x 3, no acute distress,   Skin: warm and dry  HEENT: pupils round and reactive to light, oral mucosa normal, nonicteric sclera  Neck: No JVD, tunneled dialysis catheter in the right IJ  Lungs: CTA, unlabored breathing effort  Heart: Irregular rate and rhythm, no rub  Abdomen: soft, nontender, normoactive bowels  : no palpable " bladder,  Extremities: no edema, cyanosis or clubbing  Neuro: normal speech and mental status       Results Review:    Results from last 7 days   Lab Units 02/02/21 0453 02/01/21  0558 01/31/21  0811   SODIUM mmol/L 137 140 139   POTASSIUM mmol/L 4.2 4.7 4.4   CHLORIDE mmol/L 103 107 104   CO2 mmol/L 26.2 23.4 24.3   BUN mg/dL 35* 58* 52*   CREATININE mg/dL 4.12* 5.40* 4.81*   CALCIUM mg/dL 9.1 9.4 9.3   BILIRUBIN mg/dL  --   --  0.3   ALK PHOS U/L  --   --  110   ALT (SGPT) U/L  --   --  24   AST (SGOT) U/L  --   --  16   GLUCOSE mg/dL 106* 102* 215*       Estimated Creatinine Clearance: 21.2 mL/min (A) (by C-G formula based on SCr of 4.12 mg/dL (H)).    Results from last 7 days   Lab Units 02/02/21 0453 02/01/21  0558   MAGNESIUM mg/dL  --  2.2   PHOSPHORUS mg/dL 4.1 4.6*             Results from last 7 days   Lab Units 02/02/21 0453 02/01/21  0558 01/31/21  0811   WBC 10*3/mm3 5.00 4.85 4.83   HEMOGLOBIN g/dL 9.2* 8.8* 9.1*   PLATELETS 10*3/mm3 164 171 173               Imaging Results (Last 24 Hours)     ** No results found for the last 24 hours. **        allopurinol, 100 mg, Oral, Every Other Day  apixaban, 5 mg, Oral, Q12H  atorvastatin, 40 mg, Oral, Daily  calcitriol, 0.25 mcg, Oral, Daily  calcium acetate, 1,334 mg, Oral, TID With Meals  insulin lispro, 0-7 Units, Subcutaneous, TID AC  isosorbide mononitrate, 120 mg, Oral, Daily  sodium chloride, 10 mL, Intravenous, Q12H  tamsulosin, 0.8 mg, Oral, Daily           Medication Review:   Current Facility-Administered Medications   Medication Dose Route Frequency Provider Last Rate Last Admin   • acetaminophen (TYLENOL) tablet 650 mg  650 mg Oral Q4H PRN Brett Merino APRN       • allopurinol (ZYLOPRIM) tablet 100 mg  100 mg Oral Every Other Day Laron Davison MD   100 mg at 02/01/21 0752   • apixaban (ELIQUIS) tablet 5 mg  5 mg Oral Q12H Ulices Puga MD   5 mg at 02/02/21 0657   • atorvastatin (LIPITOR) tablet 40 mg  40 mg Oral Daily  Laron Davison MD   40 mg at 02/01/21 0752   • calcitriol (ROCALTROL) capsule 0.25 mcg  0.25 mcg Oral Daily Laron Davison MD   0.25 mcg at 02/01/21 0752   • calcium acetate (PHOS BINDER)) capsule 1,334 mg  1,334 mg Oral TID With Meals Laron Davison MD   1,334 mg at 02/01/21 1702   • dextrose (D50W) 25 g/ 50mL Intravenous Solution 25 g  25 g Intravenous Q15 Min PRN Brett Merino APRN       • dextrose (GLUTOSE) oral gel 15 g  15 g Oral Q15 Min PRN Brett Merino APRN       • docusate sodium (COLACE) capsule 100 mg  100 mg Oral BID PRN Laron Davison MD   100 mg at 02/01/21 2007   • glucagon (human recombinant) (GLUCAGEN DIAGNOSTIC) injection 1 mg  1 mg Subcutaneous Q15 Min PRN Brett Merino APRN       • insulin lispro (humaLOG, ADMELOG) injection 0-7 Units  0-7 Units Subcutaneous TID AC Brett Merino APRN       • isosorbide mononitrate (IMDUR) 24 hr tablet 120 mg  120 mg Oral Daily Laron Davison MD   120 mg at 02/01/21 0753   • ondansetron (ZOFRAN) tablet 4 mg  4 mg Oral Q6H PRN Brett Merino APRN        Or   • ondansetron (ZOFRAN) injection 4 mg  4 mg Intravenous Q6H PRN Brett Merino APRN       • sodium chloride 0.9 % flush 10 mL  10 mL Intravenous Q12H Brett Merino APRN   10 mL at 02/01/21 2007   • sodium chloride 0.9 % flush 10 mL  10 mL Intravenous PRN Brett Merino APRN       • tamsulosin (FLOMAX) 24 hr capsule 0.8 mg  0.8 mg Oral Daily Laron Davison MD   0.8 mg at 02/01/21 0754       Assessment/Plan   1.  End-stage renal disease second diabetic and hypertensive glomerulosclerosis and loss of renal mass with prior partial right nephrectomy, on maintenance hemodialysis every Monday, Wednesday and Friday.  He had dialysis yesterday without any difficulties.  His electrolytes and volume status within acceptable range  2.  Syncopal episode associated with hypotension but also he has newly diagnosed atrial flutter, cardiology evaluation is in progress  3.   Dyslipidemia  4.  Diabetes mellitus type 2  5.  History of splenic artery aneurysm and hemodynamically insignificant coarctation of the aorta  6.  Anemia of chronic kidney disease hemoglobin is 9.2 treated with long acting ORLNADO and iron infusion  7.  History of hypertension has been well controlled with decreasing medication his hydralazine was discontinued recently and his carvedilol dose was decreased.      Plan:  1.  I agree with the present treatment and plan and will be putting his living related kidney transplant on hold for now  2.  The patient could be discharged from the renal standpoint and he would get his dialysis as an outpatient tomorrow.  I will be following him closely as an outpatient at the dialysis clinic                Yevgeniy Mccann MD  02/02/21  08:46 EST

## 2021-02-02 NOTE — PLAN OF CARE
Goal Outcome Evaluation:  Plan of Care Reviewed With: patient  Progress: no change  Outcome Summary: Telemetry continues A Flutter, no c/o's of dizziness or lightheadness. No soa or dyspnea. Independent in room. Safety maintained.

## 2021-02-02 NOTE — PROGRESS NOTES
LOS: 2 days   Patient Care Team:  Natanael Marshall MD as PCP - General (Pediatrics)  Gracie Conroy MD as Referring Physician (Nephrology)  Pollo Hernandez MD as Consulting Physician (Hematology and Oncology)  Ean Chavez MD as Consulting Physician (Urology)    Chief Complaint: Follow-up for atrial flutter, hypotension, syncope.    Interval History: Feels well.  His blood pressure is actually not low this morning.  No chest pain or shortness of breath.  No syncope or near syncope.  He remains in atrial flutter with a rate of around 60.    Vital Signs:  Temp:  [97.2 °F (36.2 °C)-98.9 °F (37.2 °C)] 98.9 °F (37.2 °C)  Heart Rate:  [58-70] 60  Resp:  [18] 18  BP: (149-167)/(79-93) 158/80    Intake/Output Summary (Last 24 hours) at 2/2/2021 1122  Last data filed at 2/2/2021 1010  Gross per 24 hour   Intake 320 ml   Output 2300 ml   Net -1980 ml       Physical Exam:   General Appearance:    No acute distress, alert and oriented x4   Lungs:     Clear to auscultation bilaterally     Heart:    Irregularly irregular rhythm with normal rate.  No murmurs,   gallops, or rubs.   Abdomen:     Soft, nontender, nondistended.    Extremities:   Moves all extremities well.  No clubbing, cyanosis, or edema.     Results Review:    Results from last 7 days   Lab Units 02/02/21  0453   SODIUM mmol/L 137   POTASSIUM mmol/L 4.2   CHLORIDE mmol/L 103   CO2 mmol/L 26.2   BUN mg/dL 35*   CREATININE mg/dL 4.12*   GLUCOSE mg/dL 106*   CALCIUM mg/dL 9.1         Results from last 7 days   Lab Units 02/02/21  0453   WBC 10*3/mm3 5.00   HEMOGLOBIN g/dL 9.2*   HEMATOCRIT % 27.5*   PLATELETS 10*3/mm3 164             Results from last 7 days   Lab Units 02/01/21  0558   MAGNESIUM mg/dL 2.2           I reviewed the patient's new clinical results.        Assessment:  1.  Syncope, likely related to hypotension  2.  Intermittent hypotension (typcially morning after HD)  3.  ESRD - on HD  4.  Typical atrial flutter (new)  5.   Diabetes  6.  History of hemodynamically insignificant coarctation  7.  Splenic artery aneurysm    Plan:  -Syncope is almost certainly from hypotension in the mornings after dialysis.  He is now off of carvedilol completely.  Given the intermittent bradycardia with the atrial flutter, I would recommend staying off of beta-blockers completely.    -Atrial flutter is rate controlled.  He does have rates as low as the 40s, predominantly while sleeping and in the early morning hours.  However, he is asymptomatic with regards to the atrial flutter.      -Continue Eliquis 5 mg twice a day for now.  I discussed this with Dr. Mccann.  If he is going to be on long-term anticoagulation in the future, he would need to be transitioned to warfarin so that he can get his kidney transplant.  He will have a Zio patch placed at discharge to make sure he is not having atrial fibrillation.  If not, and atrial flutter ablation could be considered as an outpatient, and he would only need systemic anticoagulation for 1 month afterwards.    -Okay to discharge per cardiology.  Follow-up with Dr. Aburto in 4 weeks.    Gaudencio Cuellar MD  02/02/21  11:22 EST

## 2021-02-03 ENCOUNTER — READMISSION MANAGEMENT (OUTPATIENT)
Dept: CALL CENTER | Facility: HOSPITAL | Age: 71
End: 2021-02-03

## 2021-02-03 NOTE — OUTREACH NOTE
Prep Survey      Responses   [facility] facility patient discharged from?  [place]   Is LACE score < 7 ?  No   Emergency Room discharge w/ pulse ox?  No   Eligibility  Readm Mgmt   Discharge diagnosis  Typical atrial flutter    Does the patient have one of the following disease processes/diagnoses(primary or secondary)?  Other   Does the patient have Home health ordered?  No   Is there a DME ordered?  No   Prep survey completed?  Yes          [place] RN

## 2021-02-04 ENCOUNTER — READMISSION MANAGEMENT (OUTPATIENT)
Dept: CALL CENTER | Facility: HOSPITAL | Age: 71
End: 2021-02-04

## 2021-02-05 NOTE — OUTREACH NOTE
Medical Week 1 Survey      Responses   [facility] patient discharged from?  [place]   Does the patient have one of the following disease processes/diagnoses(primary or secondary)?  Other   Week 1 attempt successful?  Yes   Call start time  1757   Call end time  1803   Discharge diagnosis  Typical atrial flutter    Is patient permission given to speak with other caregiver?  Yes   List who call center can speak with  Wife, Chary Boyd reviewed with patient/caregiver?  Yes   Is the patient having any side effects they believe may be caused by any medication additions or changes?  No   Does the patient have all medications ordered at discharge?  Yes   Is the patient taking all medications as directed (includes completed medication regime)?  Yes   Does the patient have a primary care provider?   Yes   Does the patient have an appointment with their PCP within 7 days of discharge?  Yes   Has the patient kept scheduled appointments due by today?  N/A   Has home health visited the patient within 72 hours of discharge?  N/A   Psychosocial issues?  No   Did the patient receive a copy of their discharge instructions?  Yes   Nursing interventions  Reviewed instructions with patient   What is the patient's perception of their health status since discharge?  Improving   Is the patient/caregiver able to teach back signs and symptoms related to disease process for when to call PCP?  Yes   Is the patient/caregiver able to teach back signs and symptoms related to disease process for when to call 911?  Yes   Is the patient/caregiver able to teach back the hierarchy of who to call/visit for symptoms/problems? PCP, Specialist, Home health nurse, Urgent Care, ED, 911  Yes   If the patient is a current smoker, are they able to teach back resources for cessation?  Not a smoker   Week 1 call completed?  Yes   Wrap up additional comments  Pt's renal transplant from his son had to be delayed due to illness in son.  Dialysis caused  his BP to drop and has occurred once since being home but did not suffer syncope.  Glucose 59 this moring corrected with juice.  's reported by patient.            Kavya Red RN

## 2021-02-12 ENCOUNTER — READMISSION MANAGEMENT (OUTPATIENT)
Dept: CALL CENTER | Facility: HOSPITAL | Age: 71
End: 2021-02-12

## 2021-02-12 NOTE — OUTREACH NOTE
"Medical Week 2 Survey      Responses   [facility] patient discharged from?  [facility]   Does the patient have one of the following disease processes/diagnoses(primary or secondary)?  Other   Week 2 attempt successful?  Yes   Call start time  1422   Discharge diagnosis  Typical atrial flutter    Call end time  1425   Meds reviewed with patient/caregiver?  Yes   Is the patient taking all medications as directed (includes completed medication regime)?  Yes   Has the patient kept scheduled appointments due by today?  Yes   Psychosocial issues?  No   Comments  Pt reports that he has not had any further syncopal episodes. He does report an occas.\"fast heartbeat\" but denies CP/SOA or fluttering.    What is the patient's perception of their health status since discharge?  Returned to baseline/stable   Is the patient/caregiver able to teach back signs and symptoms related to disease process for when to call 911?  Yes   Is the patient/caregiver able to teach back the hierarchy of who to call/visit for symptoms/problems? PCP, Specialist, Home health nurse, Urgent Care, ED, 911  Yes   Week 2 Call Completed?  Yes   Is the patient interested in additional calls from an ambulatory ?  NOTE:  applies to high risk patients requiring additional follow-up.  No   Revoked  No further contact(revokes)-requires comment   Graduated/Revoked comments  goals met   Wrap up additional comments  .          Magdalena Melendez RN  "

## 2021-02-26 ENCOUNTER — OFFICE VISIT (OUTPATIENT)
Dept: CARDIOLOGY | Facility: CLINIC | Age: 71
End: 2021-02-26

## 2021-02-26 ENCOUNTER — TELEPHONE (OUTPATIENT)
Dept: CARDIOLOGY | Facility: CLINIC | Age: 71
End: 2021-02-26

## 2021-02-26 VITALS
HEIGHT: 73 IN | DIASTOLIC BLOOD PRESSURE: 52 MMHG | BODY MASS INDEX: 29.42 KG/M2 | HEART RATE: 65 BPM | SYSTOLIC BLOOD PRESSURE: 130 MMHG | WEIGHT: 222 LBS

## 2021-02-26 DIAGNOSIS — R00.1 BRADYCARDIA: ICD-10-CM

## 2021-02-26 DIAGNOSIS — Z99.2 ESRD (END STAGE RENAL DISEASE) ON DIALYSIS (HCC): ICD-10-CM

## 2021-02-26 DIAGNOSIS — I10 ESSENTIAL HYPERTENSION: ICD-10-CM

## 2021-02-26 DIAGNOSIS — N18.6 ESRD (END STAGE RENAL DISEASE) ON DIALYSIS (HCC): ICD-10-CM

## 2021-02-26 DIAGNOSIS — I48.3 TYPICAL ATRIAL FLUTTER (HCC): Primary | ICD-10-CM

## 2021-02-26 PROCEDURE — 93000 ELECTROCARDIOGRAM COMPLETE: CPT | Performed by: NURSE PRACTITIONER

## 2021-02-26 PROCEDURE — 99214 OFFICE O/P EST MOD 30 MIN: CPT | Performed by: NURSE PRACTITIONER

## 2021-02-26 PROCEDURE — 93248 EXT ECG>7D<15D REV&INTERPJ: CPT | Performed by: INTERNAL MEDICINE

## 2021-02-26 NOTE — TELEPHONE ENCOUNTER
Faxed letter created by Glenny in Epic to Dr. Mccann.  Fax# [phone].  Faxed confirmation received. / ROD

## 2021-02-26 NOTE — TELEPHONE ENCOUNTER
Spoke with patient regarding mobile telemetry.  Average heart rate 60, lowest 35.  No heart blocks or pauses.     He needs follow up in 8-12 weeks with Dr. Puga.

## 2021-02-26 NOTE — PROGRESS NOTES
Date of Office Visit: 21  Encounter Provider: STEVEN Butts  Place of Service: [facility] CARDIOLOGY  Patient Name: Donald Johnson  :1950    Chief Complaint   Patient presents with   • Atrial Fibrillation   • Hypertension   • Follow-up   :     HPI: Donald Johnson is a 70 y.o. male  with history of end-stage renal disease on hemodialysis, diabetes mellitus, atrial flutter, hypertension, hypotension and bradycardia.     He is followed by Dr. Ulices Puga.  I will visit with him for the first time today and have reviewed his medical record.  He started on dialysis in 2020.      He was approved for kidney transplant his donor was to be his son.  He had a preop stress test that was normal.  He then presented late 2020 with an episode of syncope.  He reported intermittent lightheadedness and low blood pressure after dialysis.  He was noted to have a heart rate of 40 in the emergency department.  Hydralazine was stopped a few weeks prior to that admission.  His carvedilol has been decreased as well.  Carvedilol was ultimately stopped.  He was noted to be in atrial flutter and was started on Eliquis anticoagulation.  Echocardiogram revealed normal left ventricular systolic function, mildly dilated ascending aorta at 3.7 cm and moderate concentric hypertrophy.  He was discharged with a 2-week mobile telemetry.    He presents today for evaluation of heart rates in the 40s.  He is no longer on any AV kiley blockade.  He denies dizziness or lightheadedness at the time of bradycardia however after dialysis he is lightheaded.  He reports shortness of breath with exertion which is chronic and unchanged.  He has intermittent palpitations but denies edema and chest pain.  He was walking a couple miles a day but he has not done that for the last month.  Allergies   Allergen Reactions   • Amlodipine Swelling     Worse LE edema.           Family and social  "history reviewed.     ROS  All other systems were reviewed and are negative          Objective:     Vitals:    02/26/21 0946   BP: 130/52   BP Location: Left arm   Patient Position: Sitting   Pulse: 65   Weight: 101 kg (222 lb)   Height: 185.4 cm (73\")     Body mass index is 29.29 kg/m².    PHYSICAL EXAM:  Constitutional:       General: Not in acute distress.     Appearance: Well-developed. Not diaphoretic.   HENT:      Head: Normocephalic.   Pulmonary:      Effort: Pulmonary effort is normal. No respiratory distress.      Breath sounds: Normal breath sounds. No wheezing. No rhonchi. No rales.   Cardiovascular:      Normal rate. Irregularly irregular rhythm.   Pulses:     Radial: 2+ bilaterally.  Skin:     General: Skin is warm and dry. There is no cyanosis.      Findings: No rash.   Neurological:      Mental Status: Alert and oriented to person, place, and time.   Psychiatric:         Behavior: Behavior normal.         Thought Content: Thought content normal.         Judgment: Judgment normal.           ECG 12 Lead    Date/Time: 2/26/2021 10:32 AM  Performed by: Glenny Howard APRN  Authorized by: Glenny Howard APRN   Comparison: compared with previous ECG   Similar to previous ECG  Rhythm: atrial flutter  Rate: normal  QRS axis: left    Clinical impression: abnormal EKG              Current Outpatient Medications   Medication Sig Dispense Refill   • allopurinol (ZYLOPRIM) 100 MG tablet 150 mg.     • apixaban (ELIQUIS) 5 MG tablet tablet Take 1 tablet by mouth Every 12 (Twelve) Hours. Indications: Atrial Fibrillation 60 tablet 0   • atorvastatin (LIPITOR) 40 MG tablet Take 1 tablet by mouth daily.     • B Complex-C-Folic Acid (ROXI-YULISSA PO) Take 1 tablet by mouth Daily.     • B Complex-C-Folic Acid (ROXI-YULISSA) tablet Take 1 tablet by mouth Daily.     • calcitriol (ROCALTROL) 0.25 MCG capsule TK ONE C PO  QD     • calcium acetate (PHOS BINDER,) 667 MG capsule capsule Take 1,334 mg by mouth 3 (Three) Times a Day. " With food     • FREESTYLE LITE test strip USE ONE STRIP AS DIRECTED ONCE DAILY  3   • glimepiride (AMARYL) 1 MG tablet Take 1 mg by mouth Every Morning Before Breakfast.     • isosorbide mononitrate (IMDUR) 120 MG 24 hr tablet Take 120 mg by mouth Daily.     • Lancets (FREESTYLE) lancets INJECT 1 INTO THE SKIN EVERY DAY     • senna-docusate sodium (SENOKOT-S) 8.6-50 MG tablet Take 2 tablets by mouth Every Night. 60 tablet 0   • tamsulosin (FLOMAX) 0.4 MG capsule 24 hr capsule 2 capsules.     • torsemide (DEMADEX) 20 MG tablet Take 10 mg by mouth Daily. ONLY MON/WED/FRI       No current facility-administered medications for this visit.      Assessment:       Diagnosis Plan   1. Typical atrial flutter (CMS/[facility])     2. Bradycardia     3. ESRD (end stage renal disease) on dialysis (CMS/[facility])     4. Essential hypertension          Orders Placed This Encounter   Procedures   • ECG 12 Lead     This order was created via procedure documentation         Plan:       1.  70-year-old gentleman with persistent atrial flutter.  His heart rate is controlled but he is not on AV kiley blockade.  Carvedilol was stopped due to bradycardia however continues to have heart rate reportedly in the 40s. He wore a two week mobile telemetry and I am trying to expedite that report to be finalized. I will call him to discuss  -Anticoagulated with Eliquis  2.  History of hypertension but now with hypotension postdialysis so hydralazine and carvedilol have been stopped  3.  End-stage renal disease.  He has a temporary right subclavian line.  He was approved for kidney transplant as his son was to be the donor but his surgery was canceled due to syncope/arrhythmia and now postponed with continued bradycardia  -He is to have a left arm fistula placed next Thursday with Dr. Jm Huynh.   4.  Diabetes mellitus  5.  Peripheral arterial disease      Plan of care reviewed with Dr. Puga              It has been a pleasure to participate in this  patient's care.      Thank you,  STEVEN Butts      **I used Dragon to dictate this note:**

## 2021-02-26 NOTE — TELEPHONE ENCOUNTER
Faxed letter created by Glenny Howard in EPic to Dr. George and Leonora Ramey.  Fax# [phone].  Faxed confirmation received./ ROD

## 2021-03-02 ENCOUNTER — APPOINTMENT (OUTPATIENT)
Dept: PREADMISSION TESTING | Facility: HOSPITAL | Age: 71
End: 2021-03-02

## 2021-03-02 VITALS
OXYGEN SATURATION: 100 % | RESPIRATION RATE: 20 BRPM | WEIGHT: 222 LBS | TEMPERATURE: 98.6 F | HEART RATE: 75 BPM | DIASTOLIC BLOOD PRESSURE: 61 MMHG | HEIGHT: 73 IN | BODY MASS INDEX: 29.42 KG/M2 | SYSTOLIC BLOOD PRESSURE: 152 MMHG

## 2021-03-02 LAB
ANION GAP SERPL CALCULATED.3IONS-SCNC: 10.3 MMOL/L (ref 5–15)
BUN SERPL-MCNC: 47 MG/DL (ref 8–23)
BUN/CREAT SERPL: 9.6 (ref 7–25)
CALCIUM SPEC-SCNC: 9.2 MG/DL (ref 8.6–10.5)
CHLORIDE SERPL-SCNC: 102 MMOL/L (ref 98–107)
CO2 SERPL-SCNC: 25.7 MMOL/L (ref 22–29)
CREAT SERPL-MCNC: 4.88 MG/DL (ref 0.76–1.27)
DEPRECATED RDW RBC AUTO: 51.6 FL (ref 37–54)
ERYTHROCYTE [DISTWIDTH] IN BLOOD BY AUTOMATED COUNT: 13.5 % (ref 12.3–15.4)
GFR SERPL CREATININE-BSD FRML MDRD: 12 ML/MIN/1.73
GFR SERPL CREATININE-BSD FRML MDRD: ABNORMAL ML/MIN/{1.73_M2}
GLUCOSE SERPL-MCNC: 235 MG/DL (ref 65–99)
HCT VFR BLD AUTO: 38.2 % (ref 37.5–51)
HGB BLD-MCNC: 12.5 G/DL (ref 13–17.7)
MCH RBC QN AUTO: 33.6 PG (ref 26.6–33)
MCHC RBC AUTO-ENTMCNC: 32.7 G/DL (ref 31.5–35.7)
MCV RBC AUTO: 102.7 FL (ref 79–97)
PLATELET # BLD AUTO: 186 10*3/MM3 (ref 140–450)
PMV BLD AUTO: 10.6 FL (ref 6–12)
POTASSIUM SERPL-SCNC: 5 MMOL/L (ref 3.5–5.2)
RBC # BLD AUTO: 3.72 10*6/MM3 (ref 4.14–5.8)
SODIUM SERPL-SCNC: 138 MMOL/L (ref 136–145)
WBC # BLD AUTO: 4.38 10*3/MM3 (ref 3.4–10.8)

## 2021-03-02 PROCEDURE — 36415 COLL VENOUS BLD VENIPUNCTURE: CPT

## 2021-03-02 PROCEDURE — U0004 COV-19 TEST NON-CDC HGH THRU: HCPCS | Performed by: NURSE PRACTITIONER

## 2021-03-02 PROCEDURE — 80048 BASIC METABOLIC PNL TOTAL CA: CPT

## 2021-03-02 PROCEDURE — U0005 INFEC AGEN DETEC AMPLI PROBE: HCPCS | Performed by: NURSE PRACTITIONER

## 2021-03-02 PROCEDURE — 85027 COMPLETE CBC AUTOMATED: CPT

## 2021-03-02 PROCEDURE — C9803 HOPD COVID-19 SPEC COLLECT: HCPCS | Performed by: NURSE PRACTITIONER

## 2021-03-02 RX ORDER — CHLORHEXIDINE GLUCONATE 500 MG/1
1 CLOTH TOPICAL 2 TIMES DAILY
COMMUNITY
Start: 2021-03-04 | End: 2021-03-04 | Stop reason: HOSPADM

## 2021-03-02 RX ORDER — TORSEMIDE 10 MG/1
10 TABLET ORAL 3 TIMES WEEKLY
COMMUNITY
End: 2022-04-26

## 2021-03-02 RX ORDER — ALLOPURINOL 300 MG/1
150 TABLET ORAL DAILY
COMMUNITY

## 2021-03-02 NOTE — DISCHARGE INSTRUCTIONS
Take the following medications the morning of surgery:    Per MD instructions    If you are on prescription narcotic pain medication to control your pain you may also take that medication the morning of surgery.    General Instructions:  • Do not eat solid food after midnight the night before surgery.  • You may drink clear liquids day of surgery but must stop at least one hour before your hospital arrival time.  • It is beneficial for you to have a clear drink that contains carbohydrates the day of surgery.  We suggest a 12 to 20 ounce bottle of Gatorade or Powerade for non-diabetic patients or a 12 to 20 ounce bottle of G2 or Powerade Zero for diabetic patients. (Pediatric patients, are not advised to drink a 12 to 20 ounce carbohydrate drink)    Clear liquids are liquids you can see through.  Nothing red in color.     Plain water                               Sports drinks  Sodas                                   Gelatin (Jell-O)  Fruit juices without pulp such as white grape juice and apple juice  Popsicles that contain no fruit or yogurt  Tea or coffee (no cream or milk added)  Gatorade / Powerade  G2 / Powerade Zero    • Infants may have breast milk up to four hours before surgery.  • Infants drinking formula may drink formula up to six hours before surgery.   • Patients who avoid smoking, chewing tobacco and alcohol for 4 weeks prior to surgery have a reduced risk of post-operative complications.  Quit smoking as many days before surgery as you can.  • Do not smoke, use chewing tobacco or drink alcohol the day of surgery.   • If applicable bring your C-PAP/ BI-PAP machine.  • Bring any papers given to you in the doctor’s office.  • Wear clean comfortable clothes.  • Do not wear contact lenses, false eyelashes or make-up.  Bring a case for your glasses.   • Bring crutches or walker if applicable.  • Remove all piercings.  Leave jewelry and any other valuables at home.  • Hair extensions with metal clips must  be removed prior to surgery.  • The Pre-Admission Testing nurse will instruct you to bring medications if unable to obtain an accurate list in Pre-Admission Testing.        If you were given a blood bank ID arm band remember to bring it with you the day of surgery.    Preventing a Surgical Site Infection:  • For 2 to 3 days before surgery, avoid shaving with a razor because the razor can irritate skin and make it easier to develop an infection.    • Any areas of open skin can increase the risk of a post-operative wound infection by allowing bacteria to enter and travel throughout the body.  Notify your surgeon if you have any skin wounds / rashes even if it is not near the expected surgical site.  The area will need assessed to determine if surgery should be delayed until it is healed.  • The night prior to surgery shower using a fresh bar of anti-bacterial soap (such as Dial) and clean washcloth.  Sleep in a clean bed with clean clothing.  Do not allow pets to sleep with you.  • Shower on the morning of surgery using a fresh bar of anti-bacterial soap (such as Dial) and clean washcloth.  Dry with a clean towel and dress in clean clothing.  • Ask your surgeon if you will be receiving antibiotics prior to surgery.  • Make sure you, your family, and all healthcare providers clean their hands with soap and water or an alcohol based hand  before caring for you or your wound.    Day of surgery:3/4/2021   0800  Your arrival time is approximately two hours before your scheduled surgery time.  Upon arrival, a Pre-op nurse and Anesthesiologist will review your health history, obtain vital signs, and answer questions you may have.  The only belongings needed at this time will be a list of your home medications and if applicable your C-PAP/BI-PAP machine.  A Pre-op nurse will start an IV and you may receive medication in preparation for surgery, including something to help you relax.     Please be aware that surgery  does come with discomfort.  We want to make every effort to control your discomfort so please discuss any uncontrolled symptoms with your nurse.   Your doctor will most likely have prescribed pain medications.      If you are going home after surgery you will receive individualized written care instructions before being discharged.  A responsible adult must drive you to and from the hospital on the day of your surgery and stay with you for 24 hours.  Discharge prescriptions can be filled by the hospital pharmacy during regular pharmacy hours.  If you are having surgery late in the day/evening your prescription may be e-prescribed to your pharmacy.  Please verify your pharmacy hours or chose a 24 hour pharmacy to avoid not having access to your prescription because your pharmacy has closed for the day.    If you are staying overnight following surgery, you will be transported to your hospital room following the recovery period.  [facility] has all private rooms.    If you have any questions please call Pre-Admission Testing at [phone].  Deductibles and co-payments are collected on the day of service. Please be prepared to pay the required co-pay, deductible or deposit on the day of service as defined by your plan.    Patient Education for Self-Quarantine Process    Following your COVID testing, we strongly recommend that you do not leave your home after you have been tested for COVID except to get medical care. This includes not going to work, school or to public areas.  If this is not possible for you to do please limit your activities to only required outings.  Be sure to wear a mask when you are with other people, practice social distancing and wash your hands frequently.      The following items provide additional details to keep you safe.  • Wash your hands with soap and water frequently for at least 20 seconds.   • Avoid touching your eyes, nose and mouth with unwashed hands.  • Do not  share anything - utensils, towels, food from the same bowl.   • Have your own utensils, drinking glass, dishes, towels and bedding.   • Do not have visitors.   • Do use FaceTime to stay in touch with family and friends.  • You should stay in a specific room away from others if possible.   • Stay at least 6 feet away from others in the home if you cannot have a dedicated room to yourself.   • Do not snuggle with your pet. While the CDC says there is no evidence that pets can spread COVID-19 or be infected from humans, it is probably best to avoid “petting, snuggling, being kissed or licked and sharing food (during self-quarantine)”, according to the CDC.   • Sanitize household surfaces daily. Include all high touch areas (door handles, light switches, phones, countertops, etc.)  • Do not share a bathroom with others, if possible.   • Wear a mask around others in your home if you are unable to stay in a separate room or 6 feet apart. If  you are unable to wear a mask, have your family member wear a mask if they must be within 6 feet of you.   Call your surgeon immediately if you experience any of the following symptoms:  • Sore Throat  • Shortness of Breath or difficulty breathing  • Cough  • Chills  • Body soreness or muscle pain  • Headache  • Fever  • New loss of taste or smell  • Do not arrive for your surgery ill.  Your procedure will need to be rescheduled to another time.  You will need to call your physician before the day of surgery to avoid any unnecessary exposure to hospital staff as well as other patients.    CHLORHEXIDINE CLOTH INSTRUCTIONS  The morning of surgery follow these instructions using the Chlorhexidine cloths you've been given.  These steps reduce bacteria on the body.  Do not use the cloths near your eyes, ears mouth, genitalia or on open wounds.  Throw the cloths away after use but do not try to flush them down a toilet.      • Open and remove one cloth at a time from the package.    • Leave  the cloth unfolded and begin the bathing.  • Massage the skin with the cloths using gentle pressure to remove bacteria.  Do not scrub harshly.   • Follow the steps below with one 2% CHG cloth per area (6 total cloths).  • One cloth for neck, shoulders and chest.  • One cloth for both arms, hands, fingers and underarms (do underarms last).  • One cloth for the abdomen followed by groin.  • One cloth for right leg and foot including between the toes.  • One cloth for left leg and foot including between the toes.  • The last cloth is to be used for the back of the neck, back and buttocks.    Allow the CHG to air dry 3 minutes on the skin which will give it time to work and decrease the chance of irritation.  The skin may feel sticky until it is dry.  Do not rinse with water or any other liquid or you will lose the beneficial effects of the CHG.  If mild skin irritation occurs, do rinse the skin to remove the CHG.  Report this to the nurse at time of admission.  Do not apply lotions, creams, ointments, deodorants or perfumes after using the clothes. Dress in clean clothes before coming to the hospital.

## 2021-03-03 LAB — SARS-COV-2 RNA RESP QL NAA+PROBE: NOT DETECTED

## 2021-03-04 ENCOUNTER — ANESTHESIA EVENT (OUTPATIENT)
Dept: PERIOP | Facility: HOSPITAL | Age: 71
End: 2021-03-04

## 2021-03-04 ENCOUNTER — ANESTHESIA (OUTPATIENT)
Dept: PERIOP | Facility: HOSPITAL | Age: 71
End: 2021-03-04

## 2021-03-04 ENCOUNTER — HOSPITAL ENCOUNTER (OUTPATIENT)
Facility: HOSPITAL | Age: 71
Setting detail: HOSPITAL OUTPATIENT SURGERY
Discharge: HOME | End: 2021-03-04
Attending: SURGERY | Admitting: SURGERY

## 2021-03-04 VITALS
DIASTOLIC BLOOD PRESSURE: 86 MMHG | RESPIRATION RATE: 16 BRPM | TEMPERATURE: 97.6 F | OXYGEN SATURATION: 97 % | SYSTOLIC BLOOD PRESSURE: 170 MMHG | HEART RATE: 56 BPM

## 2021-03-04 DIAGNOSIS — N18.6 ESRD (END STAGE RENAL DISEASE) (HCC): Primary | ICD-10-CM

## 2021-03-04 LAB
GLUCOSE BLDC GLUCOMTR-MCNC: 103 MG/DL (ref 70–130)
GLUCOSE BLDC GLUCOMTR-MCNC: 81 MG/DL (ref 70–130)

## 2021-03-04 PROCEDURE — 76942 ECHO GUIDE FOR BIOPSY: CPT | Performed by: SURGERY

## 2021-03-04 PROCEDURE — 82962 GLUCOSE BLOOD TEST: CPT

## 2021-03-04 PROCEDURE — 25010000002 HEPARIN (PORCINE) PER 1000 UNITS: Performed by: SURGERY

## 2021-03-04 PROCEDURE — 25010000002 HEPARIN (PORCINE) PER 1000 UNITS: Performed by: NURSE ANESTHETIST, CERTIFIED REGISTERED

## 2021-03-04 PROCEDURE — 25010000002 PROPOFOL 10 MG/ML EMULSION: Performed by: NURSE ANESTHETIST, CERTIFIED REGISTERED

## 2021-03-04 PROCEDURE — 25010000003 MEPIVACAINE PER 10 ML: Performed by: ANESTHESIOLOGY

## 2021-03-04 PROCEDURE — 25010000002 PROTAMINE SULFATE PER 10 MG: Performed by: NURSE ANESTHETIST, CERTIFIED REGISTERED

## 2021-03-04 PROCEDURE — 25010000002 FENTANYL CITRATE (PF) 100 MCG/2ML SOLUTION: Performed by: ANESTHESIOLOGY

## 2021-03-04 PROCEDURE — 25010000003 CEFAZOLIN PER 500 MG: Performed by: SURGERY

## 2021-03-04 PROCEDURE — 25010000002 MIDAZOLAM PER 1 MG: Performed by: ANESTHESIOLOGY

## 2021-03-04 PROCEDURE — 25010000002 ROPIVACAINE PER 1 MG: Performed by: ANESTHESIOLOGY

## 2021-03-04 PROCEDURE — 25010000003 CEFAZOLIN IN DEXTROSE 2-4 GM/100ML-% SOLUTION: Performed by: SURGERY

## 2021-03-04 PROCEDURE — 25010000003 LIDOCAINE 1 % SOLUTION 20 ML VIAL: Performed by: SURGERY

## 2021-03-04 RX ORDER — FAMOTIDINE 10 MG/ML
20 INJECTION, SOLUTION INTRAVENOUS ONCE
Status: COMPLETED | OUTPATIENT
Start: 2021-03-04 | End: 2021-03-04

## 2021-03-04 RX ORDER — PROMETHAZINE HYDROCHLORIDE 25 MG/1
25 TABLET ORAL ONCE AS NEEDED
Status: DISCONTINUED | OUTPATIENT
Start: 2021-03-04 | End: 2021-03-04 | Stop reason: HOSPADM

## 2021-03-04 RX ORDER — FLUMAZENIL 0.1 MG/ML
0.2 INJECTION INTRAVENOUS AS NEEDED
Status: DISCONTINUED | OUTPATIENT
Start: 2021-03-04 | End: 2021-03-04 | Stop reason: HOSPADM

## 2021-03-04 RX ORDER — NALOXONE HCL 0.4 MG/ML
0.2 VIAL (ML) INJECTION AS NEEDED
Status: DISCONTINUED | OUTPATIENT
Start: 2021-03-04 | End: 2021-03-04 | Stop reason: HOSPADM

## 2021-03-04 RX ORDER — MIDAZOLAM HYDROCHLORIDE 1 MG/ML
1 INJECTION INTRAMUSCULAR; INTRAVENOUS
Status: COMPLETED | OUTPATIENT
Start: 2021-03-04 | End: 2021-03-04

## 2021-03-04 RX ORDER — LABETALOL HYDROCHLORIDE 5 MG/ML
5 INJECTION, SOLUTION INTRAVENOUS
Status: DISCONTINUED | OUTPATIENT
Start: 2021-03-04 | End: 2021-03-04 | Stop reason: HOSPADM

## 2021-03-04 RX ORDER — PROTAMINE SULFATE 10 MG/ML
INJECTION, SOLUTION INTRAVENOUS AS NEEDED
Status: DISCONTINUED | OUTPATIENT
Start: 2021-03-04 | End: 2021-03-04 | Stop reason: SURG

## 2021-03-04 RX ORDER — EPHEDRINE SULFATE 50 MG/ML
5 INJECTION, SOLUTION INTRAVENOUS ONCE AS NEEDED
Status: DISCONTINUED | OUTPATIENT
Start: 2021-03-04 | End: 2021-03-04 | Stop reason: HOSPADM

## 2021-03-04 RX ORDER — HYDROCODONE BITARTRATE AND ACETAMINOPHEN 5; 325 MG/1; MG/1
2 TABLET ORAL EVERY 6 HOURS PRN
Qty: 10 TABLET | Refills: 0 | Status: ON HOLD | OUTPATIENT
Start: 2021-03-04 | End: 2021-03-31

## 2021-03-04 RX ORDER — LIDOCAINE HYDROCHLORIDE 10 MG/ML
0.5 INJECTION, SOLUTION EPIDURAL; INFILTRATION; INTRACAUDAL; PERINEURAL ONCE AS NEEDED
Status: DISCONTINUED | OUTPATIENT
Start: 2021-03-04 | End: 2021-03-04 | Stop reason: HOSPADM

## 2021-03-04 RX ORDER — SODIUM CHLORIDE 0.9 % (FLUSH) 0.9 %
3-10 SYRINGE (ML) INJECTION AS NEEDED
Status: DISCONTINUED | OUTPATIENT
Start: 2021-03-04 | End: 2021-03-04 | Stop reason: HOSPADM

## 2021-03-04 RX ORDER — HYDROCODONE BITARTRATE AND ACETAMINOPHEN 7.5; 325 MG/1; MG/1
1 TABLET ORAL ONCE AS NEEDED
Status: DISCONTINUED | OUTPATIENT
Start: 2021-03-04 | End: 2021-03-04 | Stop reason: HOSPADM

## 2021-03-04 RX ORDER — PROMETHAZINE HYDROCHLORIDE 25 MG/1
25 SUPPOSITORY RECTAL ONCE AS NEEDED
Status: DISCONTINUED | OUTPATIENT
Start: 2021-03-04 | End: 2021-03-04 | Stop reason: HOSPADM

## 2021-03-04 RX ORDER — ALBUTEROL SULFATE 2.5 MG/3ML
2.5 SOLUTION RESPIRATORY (INHALATION) ONCE AS NEEDED
Status: DISCONTINUED | OUTPATIENT
Start: 2021-03-04 | End: 2021-03-04 | Stop reason: HOSPADM

## 2021-03-04 RX ORDER — DIPHENHYDRAMINE HCL 25 MG
25 CAPSULE ORAL
Status: DISCONTINUED | OUTPATIENT
Start: 2021-03-04 | End: 2021-03-04 | Stop reason: HOSPADM

## 2021-03-04 RX ORDER — SODIUM CHLORIDE 9 MG/ML
50 INJECTION, SOLUTION INTRAVENOUS CONTINUOUS
Status: DISCONTINUED | OUTPATIENT
Start: 2021-03-04 | End: 2021-03-04 | Stop reason: HOSPADM

## 2021-03-04 RX ORDER — FENTANYL CITRATE 50 UG/ML
50 INJECTION, SOLUTION INTRAMUSCULAR; INTRAVENOUS
Status: DISCONTINUED | OUTPATIENT
Start: 2021-03-04 | End: 2021-03-04 | Stop reason: HOSPADM

## 2021-03-04 RX ORDER — CEFAZOLIN SODIUM 2 G/100ML
2 INJECTION, SOLUTION INTRAVENOUS ONCE
Status: COMPLETED | OUTPATIENT
Start: 2021-03-04 | End: 2021-03-04

## 2021-03-04 RX ORDER — ONDANSETRON 2 MG/ML
4 INJECTION INTRAMUSCULAR; INTRAVENOUS ONCE AS NEEDED
Status: DISCONTINUED | OUTPATIENT
Start: 2021-03-04 | End: 2021-03-04 | Stop reason: HOSPADM

## 2021-03-04 RX ORDER — HEPARIN SODIUM 1000 [USP'U]/ML
INJECTION, SOLUTION INTRAVENOUS; SUBCUTANEOUS AS NEEDED
Status: DISCONTINUED | OUTPATIENT
Start: 2021-03-04 | End: 2021-03-04 | Stop reason: SURG

## 2021-03-04 RX ORDER — SODIUM CHLORIDE 9 MG/ML
9 INJECTION, SOLUTION INTRAVENOUS CONTINUOUS
Status: DISCONTINUED | OUTPATIENT
Start: 2021-03-04 | End: 2021-03-04 | Stop reason: HOSPADM

## 2021-03-04 RX ORDER — ROPIVACAINE HYDROCHLORIDE 5 MG/ML
INJECTION, SOLUTION EPIDURAL; INFILTRATION; PERINEURAL
Status: COMPLETED | OUTPATIENT
Start: 2021-03-04 | End: 2021-03-04

## 2021-03-04 RX ORDER — SODIUM CHLORIDE 0.9 % (FLUSH) 0.9 %
3 SYRINGE (ML) INJECTION EVERY 12 HOURS SCHEDULED
Status: DISCONTINUED | OUTPATIENT
Start: 2021-03-04 | End: 2021-03-04 | Stop reason: HOSPADM

## 2021-03-04 RX ORDER — OXYCODONE AND ACETAMINOPHEN 7.5; 325 MG/1; MG/1
1 TABLET ORAL ONCE AS NEEDED
Status: DISCONTINUED | OUTPATIENT
Start: 2021-03-04 | End: 2021-03-04 | Stop reason: HOSPADM

## 2021-03-04 RX ORDER — SODIUM CHLORIDE, SODIUM LACTATE, POTASSIUM CHLORIDE, CALCIUM CHLORIDE 600; 310; 30; 20 MG/100ML; MG/100ML; MG/100ML; MG/100ML
9 INJECTION, SOLUTION INTRAVENOUS CONTINUOUS
Status: DISCONTINUED | OUTPATIENT
Start: 2021-03-04 | End: 2021-03-04

## 2021-03-04 RX ORDER — MIDAZOLAM HYDROCHLORIDE 1 MG/ML
0.5 INJECTION INTRAMUSCULAR; INTRAVENOUS
Status: COMPLETED | OUTPATIENT
Start: 2021-03-04 | End: 2021-03-04

## 2021-03-04 RX ORDER — PROPOFOL 10 MG/ML
VIAL (ML) INTRAVENOUS CONTINUOUS PRN
Status: DISCONTINUED | OUTPATIENT
Start: 2021-03-04 | End: 2021-03-04 | Stop reason: SURG

## 2021-03-04 RX ORDER — DIPHENHYDRAMINE HYDROCHLORIDE 50 MG/ML
12.5 INJECTION INTRAMUSCULAR; INTRAVENOUS
Status: DISCONTINUED | OUTPATIENT
Start: 2021-03-04 | End: 2021-03-04 | Stop reason: HOSPADM

## 2021-03-04 RX ADMIN — SODIUM CHLORIDE: 9 INJECTION, SOLUTION INTRAVENOUS at 10:45

## 2021-03-04 RX ADMIN — MIDAZOLAM 1 MG: 1 INJECTION INTRAMUSCULAR; INTRAVENOUS at 09:07

## 2021-03-04 RX ADMIN — FAMOTIDINE 20 MG: 10 INJECTION, SOLUTION INTRAVENOUS at 09:01

## 2021-03-04 RX ADMIN — CEFAZOLIN SODIUM 2 G: 2 INJECTION, SOLUTION INTRAVENOUS at 11:01

## 2021-03-04 RX ADMIN — HEPARIN SODIUM 3000 UNITS: 1000 INJECTION INTRAVENOUS; SUBCUTANEOUS at 11:25

## 2021-03-04 RX ADMIN — MIDAZOLAM 1 MG: 1 INJECTION INTRAMUSCULAR; INTRAVENOUS at 09:02

## 2021-03-04 RX ADMIN — ROPIVACAINE HYDROCHLORIDE 30 ML: 5 INJECTION, SOLUTION EPIDURAL; INFILTRATION; PERINEURAL at 09:16

## 2021-03-04 RX ADMIN — FENTANYL CITRATE 50 MCG: 50 INJECTION, SOLUTION INTRAMUSCULAR; INTRAVENOUS at 09:07

## 2021-03-04 RX ADMIN — PROTAMINE SULFATE 30 MG: 10 INJECTION, SOLUTION INTRAVENOUS at 11:56

## 2021-03-04 RX ADMIN — PROPOFOL 50 MCG/KG/MIN: 10 INJECTION, EMULSION INTRAVENOUS at 11:10

## 2021-03-04 RX ADMIN — MEPIVACAINE HYDROCHLORIDE 10 ML: 15 INJECTION, SOLUTION EPIDURAL; INFILTRATION at 09:16

## 2021-03-04 RX ADMIN — FENTANYL CITRATE 50 MCG: 50 INJECTION, SOLUTION INTRAMUSCULAR; INTRAVENOUS at 09:02

## 2021-03-04 NOTE — OP NOTE
Operative Note  Location: [facility]    Pre-op Diagnosis: End Stage Renal Disease    Post-op Diagnosis: Same    Procedure(s):  LEFT ARM Radiocephalic arteriovenous FISTULA PLACEMENT    Surgeon(s):  Jm Huynh MD    Assistant: Carie YOUNG     Anesthesia: Monitored Anesthesia Care with Regional    Estimated Blood Loss: minimal     Staff:   Circulator: Leonora Aguilera RN  Orderly: Teressa Wilde  Assistant: Carie Gabriel CSA    Complications: None    Specimen: None    Findings:   Patient had a nice radial and forearm cephalic so I elected to do a radiocephalic arteriovenous fistula the despite the fact that he 70 and already on dialysis because the artery and vein both look so good.  I also ligated 2 branches a few centimeters from the arterial anastomosis to try to improve flow.    Indications:  The patient is an 70 y.o. male referred for evaluation for AV fistula placement.  The patient has End Stage Renal Disease .  After evaluation in the office and ultrasound vein mapping of the upper extremities the patient was determined to be a candidate for brachiocephalic arteriovenous fistula.  The risks, benefits, and alternatives were discussed with the patient who agreed to proceed.  This includes but is not limited to nerve injury, vascular compromise, infection, and failure to mature.    Procedure:  The patient was taken to Operating Room and identified as Donald Johnson and the procedure verified as left radiocephalic AVF. A Time Out was held and the above information confirmed.    In the operating room with the patient sedated the arm was mapped under ultrasound again to help determine the best location for incision.  A decision was made to make the incision transversely at the level of the wrist..  Skin and subcutaneous tissues were anesthetized.  The skin and subcutaneous was divided sharply.  The cephalic vein was identified and dissected out circumferentially for a  significant segment.  It was marked.  The radial artery was exposed circumferentially.  Enough of the cephalic vein was exposed so that it could be ligated distally with a silk ligature and then transposed over to the brachial artery.  The vein is spatulated in order to fit the arterial anastomosis.  It was marked to reduce the risk of twisting.  The radial artery was opened up with 11 blade scalpel and small Rapp scissors.  A running anastomosis is done with a 6-0 Prolene suture.  It was flushed and de-aired prior to completion.  There was a good thrill to completion of the case.  However, I did see to large branches so I dissected out and ligated these one of them requiring a separate incision.  There was a good Doppler signal at the wrist as well.  The wound was copious irrigated with an antibiotic irrigation and closed in 2 layers with Vicryl sutures.  Dermabond glue was used for the skin.  Patient tolerated it well and no intraoperative complications were immediately apparent.    Plan: If this does not mature, would be a good candidate for a left arm brachiocephalic but hopefully that is not necessary.    There are no hospital problems to display for this patient.     Jm Huynh MD     Date: 3/4/2021  Time: 12:04 EST

## 2021-03-04 NOTE — ANESTHESIA PROCEDURE NOTES
Peripheral Block      Patient reassessed immediately prior to procedure    Patient location during procedure: holding area  Start time: 3/4/2021 9:00 AM  Stop time: 3/4/2021 9:15 AM  Reason for block: at surgeon's request and post-op pain management  Performed by  Anesthesiologist: Ean Mchugh MD  Preanesthetic Checklist  Completed: patient identified, site marked, surgical consent, pre-op evaluation, timeout performed, IV checked, risks and benefits discussed and monitors and equipment checked  Prep:  Pt Position: supine  Sterile barriers:gloves and cap  Prep: ChloraPrep  Patient monitoring: blood pressure monitoring, continuous pulse oximetry and EKG  Procedure  Sedation:yes    Guidance:ultrasound guided  ULTRASOUND INTERPRETATION. Using ultrasound guidance a 22 G gauge needle was placed in close proximity to the nerve, at which point, under ultrasound guidance anesthetic was injected in the area of the nerve and spread of the anesthesia was seen on ultrasound in close proximity thereto.  There were no abnormalities seen on ultrasound; a digital image was taken; and the patient tolerated the procedure with no complications. Images:still images obtained    Laterality:left  Block Type:interscalene and supraclavicular  Injection Technique:single-shot  Needle Type:echogenic  Needle Gauge:21 G  Resistance on Injection: none    Medications Used: mepivacaine (CARBOCAINE) 1.5 % injection, 10 mL  ropivacaine (NAROPIN) 0.5 % injection, 30 mL  Med admintered at 3/4/2021 9:16 AM      Post Assessment  Injection Assessment: incremental injection, no paresthesia on injection and negative aspiration for heme  Patient Tolerance:comfortable throughout block  Complications:no

## 2021-03-04 NOTE — ANESTHESIA PREPROCEDURE EVALUATION
Anesthesia Evaluation     Patient summary reviewed and Nursing notes reviewed   history of anesthetic complications: PONV  NPO Solid Status: > 8 hours  NPO Liquid Status: > 2 hours           Airway   Mallampati: II  Neck ROM: full  No difficulty expected  Dental - normal exam     Pulmonary     breath sounds clear to auscultation  Cardiovascular     Rhythm: regular    (+) hypertension, dysrhythmias Atrial Flutter, PVD, hyperlipidemia,       Neuro/Psych  (+) syncope,     GI/Hepatic/Renal/Endo    (+)   renal disease dialysis, diabetes mellitus,     Musculoskeletal     Abdominal    Substance History      OB/GYN          Other   arthritis,    history of cancer                    Anesthesia Plan    ASA 3     regional       Anesthetic plan, all risks, benefits, and alternatives have been provided, discussed and informed consent has been obtained with: patient.

## 2021-03-04 NOTE — DISCHARGE INSTRUCTIONS
[facility]  Joao Maynard, Lilian Blancas Scherrer, Thomas  [address] [facility], [address]  [phone]    Post-Operative Instructions for AV Fistula / Graft   Diet: Regular Diet    Medications: Take your regularly scheduled medications on the day of your surgery, unless your doctor has directed you otherwise. You may be sent home with a prescription for pain medication, follow the directions as prescribed.    Activity Restrictions / Driving: Avoid lifting more than 15 pounds or other activities that stress or compress the access area. No driving for the remainder of the day after surgery. You may drive when you no longer are taking narcotic pain medications. If a nerve block was done to numb your arm for surgery, you will be placed in an arm sling.  This numbness and inability to move the arm can last for as little as 6 hours but as many as 18.  The sling should be used during this time but can be removed when sensation and movement of your arm is normal and does not need to be used after that. Use of the arm is encouraged after the surgery.    Incision Care: Some bruising is normal. If you have drainage from the incision please notify the office. Dressing should be removed in 48 hours. After dressing is removed, it is OK to shower. Do not submerge incision until cleared by your surgeon (bath or swimming).    Bathing and Showering: You may shower after you remove your dressing.    Follow-up Appointments: You will need to return to the office for a follow-up visit within 1-3 weeks after your surgery. Please make sure you have your appointment scheduled, call [phone].    The patient (you) should:  1. Avoid wearing tight constrictive clothing over that arm.  2. Avoid wearing jewelry that is tight, such as a watch on the access arm.  3. Avoid carrying heavy objects.  4. Avoid purse straps over the fistula.  5. Avoid sleeping on the arm or keeping it bent for extended periods of time.  6. Each day,  "using your opposite hand, feel over the fistula for the \"thrill\" or vibration that is normally present.    Fistula Information / Care:  ·  It is normal to have swelling in the surgical area. To help control this swelling, you should elevate your arm on a pillow.  ·  Wiggle your fingers and clinch your fist 10 times every hour, while awake, for the first 5-7 days. Also, bend and straighten at the elbow to regain normal range of motion. These exercises are designed to promote circulation in the fingers and aid in draining away the excess fluid accumulation in the immediate area.  · No blood pressures or needle sticks in the arm with your access.    Call the office for the followin. Fever greater than 101.0  2. Uncontrolled pain. This is on a scale of 1-10 (10 being the worst pain imaginable) your pain is a level 7 or above.  3. It is important that you notify our office if you are having numbness and significant pain in the extremity in which you have just had surgery!  4. Decreased or absent thrill.  5. Nausea, diarrhea, and/or vomiting that continue for 12-24 hours.  6. Signs of an infection: redness, increased swelling, drainage, fever and/or chills.  7. Chest pain or difficulty breathing.    The fistula or graft CAN NOT be used until the MD has given written approval. Generally, a graft will be ready to use in 2 weeks, and a fistula will be ready to use in 6-8 weeks.     If you have further questions after reading this handout, the office is open from 8:30am to 5:00pm Monday through Friday. Call [phone].              What to expect after a Nerve Block    Nerve blocks administered to block pain affect many types of nerves, including those nerves that control movement, pain, and normal sensation. Following a nerve block, you may notice some bruising at the site where the block was given. You may experience sensations such as: numbness of the affected area or limb, tingling, heaviness (that is the limb " feels heavy to you), weakness or inability to move the affected arm or leg, or a feeling as if your arm or leg has “fallen asleep.”     A nerve block can last from 2 to 36 hours depending on the medications used.  Usually the weakness wears off first followed by the tingling and heaviness. As the block wears off, you may begin to notice pain; however, this sequence of events may occur in any order. Typically, you will be able to move your limb before you will feel it. Once a nerve block begins to wear off, the effects are usually completely gone within 60 minutes.  If you experience continued side effects that you believe are block related for longer than 48 hours, please call your healthcare provider. Please see block-specific instructions below.    Instructions for any block involving the shoulder or arm  • If you have had any kind of shoulder/arm block, you will go home with your arm in a sling. Wear the sling until the block has completely worn off. You may be required to wear it for a longer period of time per your surgeon’s recommendations.  • If you have had a shoulder/arm block, it is a good idea to sleep on a recliner with pillows under your arm.    You may experience symptoms such as:  Shortness of breath  Hoarseness   Blurry vision  Unequal pupils  Drooping of your face on the same side as the block was performed    These are side effects associated with this kind of block and should go away within 12 hours.    Note: If you have severe or prolonged shortness of breath, please seek medical assistance as soon as possible.     Protection of a “blocked” arm or leg (limb)  • After a nerve block, you cannot feel pain, pressure, or extremes of temperature in the affected limb. And because of this, your blocked limb is at more risk for injury. For example, it is possible to burn your limb on an extremely hot surface without feeling it.     • When resting, it is important to reposition your limb periodically to  avoid prolonged pressure on it. This may require the use of pillows and padding.    • While sleeping, you should avoid rolling onto the affected limb or putting too much pressure on it.     • If you have a cast or tight dressing, check the color of your fingers or toes of the affected limb. Call your surgeon if they look discolored (that is, dusky, dark colored).    • Use caution in cold weather. Cover your limb appropriately to protect it from the cold.      Pain Management:    Your surgeon will give you a prescription for pain medication. Begin taking this before the nerve block wears off. Bear in mind that sometimes the block can wear off in the middle of the night.

## 2021-03-31 ENCOUNTER — HOSPITAL ENCOUNTER (OUTPATIENT)
Facility: HOSPITAL | Age: 71
Setting detail: OBSERVATION
Discharge: HOME | End: 2021-04-01
Attending: EMERGENCY MEDICINE | Admitting: INTERNAL MEDICINE

## 2021-03-31 ENCOUNTER — APPOINTMENT (OUTPATIENT)
Dept: CT IMAGING | Facility: HOSPITAL | Age: 71
End: 2021-03-31

## 2021-03-31 ENCOUNTER — APPOINTMENT (OUTPATIENT)
Dept: GENERAL RADIOLOGY | Facility: HOSPITAL | Age: 71
End: 2021-03-31

## 2021-03-31 DIAGNOSIS — N18.6 ESRD (END STAGE RENAL DISEASE) (HCC): ICD-10-CM

## 2021-03-31 DIAGNOSIS — R53.1 GENERALIZED WEAKNESS: Primary | ICD-10-CM

## 2021-03-31 DIAGNOSIS — R11.2 NAUSEA AND VOMITING, INTRACTABILITY OF VOMITING NOT SPECIFIED, UNSPECIFIED VOMITING TYPE: ICD-10-CM

## 2021-03-31 LAB
ALBUMIN SERPL-MCNC: 4.4 G/DL (ref 3.5–5.2)
ALBUMIN/GLOB SERPL: 1.5 G/DL
ALP SERPL-CCNC: 135 U/L (ref 39–117)
ALT SERPL W P-5'-P-CCNC: 29 U/L (ref 1–41)
ANION GAP SERPL CALCULATED.3IONS-SCNC: 17.4 MMOL/L (ref 5–15)
AST SERPL-CCNC: 20 U/L (ref 1–40)
BASOPHILS # BLD AUTO: 0.04 10*3/MM3 (ref 0–0.2)
BASOPHILS NFR BLD AUTO: 0.8 % (ref 0–1.5)
BILIRUB SERPL-MCNC: 0.9 MG/DL (ref 0–1.2)
BUN SERPL-MCNC: 24 MG/DL (ref 8–23)
BUN/CREAT SERPL: 6.7 (ref 7–25)
CALCIUM SPEC-SCNC: 9.4 MG/DL (ref 8.6–10.5)
CHLORIDE SERPL-SCNC: 97 MMOL/L (ref 98–107)
CK SERPL-CCNC: 64 U/L (ref 20–200)
CO2 SERPL-SCNC: 22.6 MMOL/L (ref 22–29)
CREAT SERPL-MCNC: 3.58 MG/DL (ref 0.76–1.27)
DEPRECATED RDW RBC AUTO: 47.8 FL (ref 37–54)
EOSINOPHIL # BLD AUTO: 0.33 10*3/MM3 (ref 0–0.4)
EOSINOPHIL NFR BLD AUTO: 6.4 % (ref 0.3–6.2)
ERYTHROCYTE [DISTWIDTH] IN BLOOD BY AUTOMATED COUNT: 13.2 % (ref 12.3–15.4)
GFR SERPL CREATININE-BSD FRML MDRD: 17 ML/MIN/1.73
GLOBULIN UR ELPH-MCNC: 2.9 GM/DL
GLUCOSE BLDC GLUCOMTR-MCNC: 141 MG/DL (ref 70–130)
GLUCOSE BLDC GLUCOMTR-MCNC: 226 MG/DL (ref 70–130)
GLUCOSE SERPL-MCNC: 196 MG/DL (ref 65–99)
HCT VFR BLD AUTO: 38.4 % (ref 37.5–51)
HGB BLD-MCNC: 12.5 G/DL (ref 13–17.7)
IMM GRANULOCYTES # BLD AUTO: 0.03 10*3/MM3 (ref 0–0.05)
IMM GRANULOCYTES NFR BLD AUTO: 0.6 % (ref 0–0.5)
LYMPHOCYTES # BLD AUTO: 0.66 10*3/MM3 (ref 0.7–3.1)
LYMPHOCYTES NFR BLD AUTO: 12.8 % (ref 19.6–45.3)
MCH RBC QN AUTO: 31.7 PG (ref 26.6–33)
MCHC RBC AUTO-ENTMCNC: 32.6 G/DL (ref 31.5–35.7)
MCV RBC AUTO: 97.5 FL (ref 79–97)
MONOCYTES # BLD AUTO: 0.52 10*3/MM3 (ref 0.1–0.9)
MONOCYTES NFR BLD AUTO: 10.1 % (ref 5–12)
NEUTROPHILS NFR BLD AUTO: 3.56 10*3/MM3 (ref 1.7–7)
NEUTROPHILS NFR BLD AUTO: 69.3 % (ref 42.7–76)
NRBC BLD AUTO-RTO: 0 /100 WBC (ref 0–0.2)
PLATELET # BLD AUTO: 183 10*3/MM3 (ref 140–450)
PMV BLD AUTO: 11.1 FL (ref 6–12)
POTASSIUM SERPL-SCNC: 4.4 MMOL/L (ref 3.5–5.2)
PROT SERPL-MCNC: 7.3 G/DL (ref 6–8.5)
QT INTERVAL: 484 MS
RBC # BLD AUTO: 3.94 10*6/MM3 (ref 4.14–5.8)
SARS-COV-2 ORF1AB RESP QL NAA+PROBE: NOT DETECTED
SODIUM SERPL-SCNC: 137 MMOL/L (ref 136–145)
TROPONIN T SERPL-MCNC: 0.03 NG/ML (ref 0–0.03)
WBC # BLD AUTO: 5.14 10*3/MM3 (ref 3.4–10.8)

## 2021-03-31 PROCEDURE — C9803 HOPD COVID-19 SPEC COLLECT: HCPCS

## 2021-03-31 PROCEDURE — G0378 HOSPITAL OBSERVATION PER HR: HCPCS

## 2021-03-31 PROCEDURE — 70450 CT HEAD/BRAIN W/O DYE: CPT

## 2021-03-31 PROCEDURE — 93010 ELECTROCARDIOGRAM REPORT: CPT | Performed by: INTERNAL MEDICINE

## 2021-03-31 PROCEDURE — U0005 INFEC AGEN DETEC AMPLI PROBE: HCPCS | Performed by: EMERGENCY MEDICINE

## 2021-03-31 PROCEDURE — U0004 COV-19 TEST NON-CDC HGH THRU: HCPCS | Performed by: EMERGENCY MEDICINE

## 2021-03-31 PROCEDURE — 71045 X-RAY EXAM CHEST 1 VIEW: CPT

## 2021-03-31 PROCEDURE — 85025 COMPLETE CBC W/AUTO DIFF WBC: CPT | Performed by: EMERGENCY MEDICINE

## 2021-03-31 PROCEDURE — 84484 ASSAY OF TROPONIN QUANT: CPT | Performed by: EMERGENCY MEDICINE

## 2021-03-31 PROCEDURE — 93005 ELECTROCARDIOGRAM TRACING: CPT | Performed by: EMERGENCY MEDICINE

## 2021-03-31 PROCEDURE — 99284 EMERGENCY DEPT VISIT MOD MDM: CPT

## 2021-03-31 PROCEDURE — 25010000002 ONDANSETRON PER 1 MG: Performed by: EMERGENCY MEDICINE

## 2021-03-31 PROCEDURE — 82962 GLUCOSE BLOOD TEST: CPT

## 2021-03-31 PROCEDURE — 82550 ASSAY OF CK (CPK): CPT | Performed by: INTERNAL MEDICINE

## 2021-03-31 PROCEDURE — 93005 ELECTROCARDIOGRAM TRACING: CPT

## 2021-03-31 PROCEDURE — 80053 COMPREHEN METABOLIC PANEL: CPT | Performed by: EMERGENCY MEDICINE

## 2021-03-31 PROCEDURE — 96374 THER/PROPH/DIAG INJ IV PUSH: CPT

## 2021-03-31 RX ORDER — HYDROCODONE BITARTRATE AND ACETAMINOPHEN 5; 325 MG/1; MG/1
1 TABLET ORAL EVERY 4 HOURS PRN
Status: DISCONTINUED | OUTPATIENT
Start: 2021-03-31 | End: 2021-03-31 | Stop reason: ALTCHOICE

## 2021-03-31 RX ORDER — TORSEMIDE 10 MG/1
10 TABLET ORAL 3 TIMES WEEKLY
Status: DISCONTINUED | OUTPATIENT
Start: 2021-03-31 | End: 2021-04-01 | Stop reason: HOSPADM

## 2021-03-31 RX ORDER — ONDANSETRON 2 MG/ML
4 INJECTION INTRAMUSCULAR; INTRAVENOUS ONCE
Status: COMPLETED | OUTPATIENT
Start: 2021-03-31 | End: 2021-03-31

## 2021-03-31 RX ORDER — NITROGLYCERIN 0.4 MG/1
0.4 TABLET SUBLINGUAL
Status: DISCONTINUED | OUTPATIENT
Start: 2021-03-31 | End: 2021-04-01 | Stop reason: HOSPADM

## 2021-03-31 RX ORDER — ATORVASTATIN CALCIUM 20 MG/1
40 TABLET, FILM COATED ORAL EVERY MORNING
Status: DISCONTINUED | OUTPATIENT
Start: 2021-04-01 | End: 2021-04-01 | Stop reason: HOSPADM

## 2021-03-31 RX ORDER — ACETAMINOPHEN 325 MG/1
650 TABLET ORAL EVERY 4 HOURS PRN
Status: DISCONTINUED | OUTPATIENT
Start: 2021-03-31 | End: 2021-04-01 | Stop reason: HOSPADM

## 2021-03-31 RX ORDER — NICOTINE POLACRILEX 4 MG
15 LOZENGE BUCCAL
Status: DISCONTINUED | OUTPATIENT
Start: 2021-03-31 | End: 2021-04-01 | Stop reason: HOSPADM

## 2021-03-31 RX ORDER — HYDROCODONE BITARTRATE AND ACETAMINOPHEN 5; 325 MG/1; MG/1
2 TABLET ORAL EVERY 6 HOURS PRN
Status: DISCONTINUED | OUTPATIENT
Start: 2021-03-31 | End: 2021-04-01 | Stop reason: HOSPADM

## 2021-03-31 RX ORDER — SODIUM CHLORIDE 0.9 % (FLUSH) 0.9 %
10 SYRINGE (ML) INJECTION AS NEEDED
Status: DISCONTINUED | OUTPATIENT
Start: 2021-03-31 | End: 2021-04-01 | Stop reason: HOSPADM

## 2021-03-31 RX ORDER — ONDANSETRON 2 MG/ML
4 INJECTION INTRAMUSCULAR; INTRAVENOUS EVERY 6 HOURS PRN
Status: DISCONTINUED | OUTPATIENT
Start: 2021-03-31 | End: 2021-04-01 | Stop reason: HOSPADM

## 2021-03-31 RX ORDER — INSULIN LISPRO 100 [IU]/ML
0-9 INJECTION, SOLUTION INTRAVENOUS; SUBCUTANEOUS
Status: DISCONTINUED | OUTPATIENT
Start: 2021-03-31 | End: 2021-04-01 | Stop reason: HOSPADM

## 2021-03-31 RX ORDER — ACETAMINOPHEN 160 MG/5ML
650 SOLUTION ORAL EVERY 4 HOURS PRN
Status: DISCONTINUED | OUTPATIENT
Start: 2021-03-31 | End: 2021-04-01 | Stop reason: HOSPADM

## 2021-03-31 RX ORDER — AMOXICILLIN 250 MG
2 CAPSULE ORAL 2 TIMES DAILY PRN
Status: DISCONTINUED | OUTPATIENT
Start: 2021-03-31 | End: 2021-04-01 | Stop reason: HOSPADM

## 2021-03-31 RX ORDER — ONDANSETRON 4 MG/1
4 TABLET, FILM COATED ORAL EVERY 6 HOURS PRN
Status: DISCONTINUED | OUTPATIENT
Start: 2021-03-31 | End: 2021-04-01 | Stop reason: HOSPADM

## 2021-03-31 RX ORDER — SODIUM CHLORIDE 0.9 % (FLUSH) 0.9 %
10 SYRINGE (ML) INJECTION EVERY 12 HOURS SCHEDULED
Status: DISCONTINUED | OUTPATIENT
Start: 2021-03-31 | End: 2021-04-01 | Stop reason: HOSPADM

## 2021-03-31 RX ORDER — DEXTROSE MONOHYDRATE 25 G/50ML
25 INJECTION, SOLUTION INTRAVENOUS
Status: DISCONTINUED | OUTPATIENT
Start: 2021-03-31 | End: 2021-04-01 | Stop reason: HOSPADM

## 2021-03-31 RX ORDER — ALLOPURINOL 300 MG/1
150 TABLET ORAL DAILY
Status: DISCONTINUED | OUTPATIENT
Start: 2021-03-31 | End: 2021-04-01 | Stop reason: HOSPADM

## 2021-03-31 RX ORDER — CALCIUM ACETATE 667 MG/1
1334 CAPSULE ORAL
Status: DISCONTINUED | OUTPATIENT
Start: 2021-03-31 | End: 2021-04-01 | Stop reason: HOSPADM

## 2021-03-31 RX ORDER — TAMSULOSIN HYDROCHLORIDE 0.4 MG/1
0.8 CAPSULE ORAL NIGHTLY
Status: DISCONTINUED | OUTPATIENT
Start: 2021-03-31 | End: 2021-04-01 | Stop reason: HOSPADM

## 2021-03-31 RX ORDER — ACETAMINOPHEN 650 MG/1
650 SUPPOSITORY RECTAL EVERY 4 HOURS PRN
Status: DISCONTINUED | OUTPATIENT
Start: 2021-03-31 | End: 2021-04-01 | Stop reason: HOSPADM

## 2021-03-31 RX ORDER — ISOSORBIDE MONONITRATE 60 MG/1
120 TABLET, EXTENDED RELEASE ORAL EVERY MORNING
Status: DISCONTINUED | OUTPATIENT
Start: 2021-04-01 | End: 2021-04-01 | Stop reason: HOSPADM

## 2021-03-31 RX ADMIN — TORSEMIDE 10 MG: 10 TABLET ORAL at 18:57

## 2021-03-31 RX ADMIN — TAMSULOSIN HYDROCHLORIDE 0.8 MG: 0.4 CAPSULE ORAL at 20:37

## 2021-03-31 RX ADMIN — CALCIUM ACETATE 1334 MG: 667 CAPSULE ORAL at 18:57

## 2021-03-31 RX ADMIN — ONDANSETRON 4 MG: 2 INJECTION INTRAMUSCULAR; INTRAVENOUS at 13:48

## 2021-03-31 RX ADMIN — SODIUM CHLORIDE, PRESERVATIVE FREE 10 ML: 5 INJECTION INTRAVENOUS at 20:38

## 2021-03-31 RX ADMIN — ALLOPURINOL 150 MG: 300 TABLET ORAL at 18:58

## 2021-04-01 ENCOUNTER — APPOINTMENT (OUTPATIENT)
Dept: MRI IMAGING | Facility: HOSPITAL | Age: 71
End: 2021-04-01

## 2021-04-01 VITALS
OXYGEN SATURATION: 100 % | RESPIRATION RATE: 17 BRPM | WEIGHT: 212.08 LBS | BODY MASS INDEX: 27.98 KG/M2 | DIASTOLIC BLOOD PRESSURE: 52 MMHG | SYSTOLIC BLOOD PRESSURE: 90 MMHG | TEMPERATURE: 98.1 F | HEART RATE: 63 BPM

## 2021-04-01 LAB
ALBUMIN SERPL-MCNC: 4 G/DL (ref 3.5–5.2)
ALBUMIN/GLOB SERPL: 1.6 G/DL
ALP SERPL-CCNC: 116 U/L (ref 39–117)
ALT SERPL W P-5'-P-CCNC: 24 U/L (ref 1–41)
ANION GAP SERPL CALCULATED.3IONS-SCNC: 11.6 MMOL/L (ref 5–15)
AST SERPL-CCNC: 18 U/L (ref 1–40)
BASOPHILS # BLD AUTO: 0.02 10*3/MM3 (ref 0–0.2)
BASOPHILS NFR BLD AUTO: 0.4 % (ref 0–1.5)
BILIRUB SERPL-MCNC: 0.5 MG/DL (ref 0–1.2)
BUN SERPL-MCNC: 38 MG/DL (ref 8–23)
BUN/CREAT SERPL: 7.3 (ref 7–25)
CALCIUM SPEC-SCNC: 9.3 MG/DL (ref 8.6–10.5)
CHLORIDE SERPL-SCNC: 99 MMOL/L (ref 98–107)
CHOLEST SERPL-MCNC: 122 MG/DL (ref 0–200)
CO2 SERPL-SCNC: 25.4 MMOL/L (ref 22–29)
CREAT SERPL-MCNC: 5.18 MG/DL (ref 0.76–1.27)
DEPRECATED RDW RBC AUTO: 47.2 FL (ref 37–54)
EOSINOPHIL # BLD AUTO: 0.27 10*3/MM3 (ref 0–0.4)
EOSINOPHIL NFR BLD AUTO: 5 % (ref 0.3–6.2)
ERYTHROCYTE [DISTWIDTH] IN BLOOD BY AUTOMATED COUNT: 13.2 % (ref 12.3–15.4)
GFR SERPL CREATININE-BSD FRML MDRD: 11 ML/MIN/1.73
GFR SERPL CREATININE-BSD FRML MDRD: ABNORMAL ML/MIN/{1.73_M2}
GLOBULIN UR ELPH-MCNC: 2.5 GM/DL
GLUCOSE BLDC GLUCOMTR-MCNC: 102 MG/DL (ref 70–130)
GLUCOSE BLDC GLUCOMTR-MCNC: 109 MG/DL (ref 70–130)
GLUCOSE SERPL-MCNC: 101 MG/DL (ref 65–99)
HBA1C MFR BLD: 5.8 % (ref 4.8–5.6)
HCT VFR BLD AUTO: 34.7 % (ref 37.5–51)
HDLC SERPL-MCNC: 39 MG/DL (ref 40–60)
HGB BLD-MCNC: 11.7 G/DL (ref 13–17.7)
IMM GRANULOCYTES # BLD AUTO: 0.01 10*3/MM3 (ref 0–0.05)
IMM GRANULOCYTES NFR BLD AUTO: 0.2 % (ref 0–0.5)
LDLC SERPL CALC-MCNC: 67 MG/DL (ref 0–100)
LDLC/HDLC SERPL: 1.73 {RATIO}
LIPASE SERPL-CCNC: 61 U/L (ref 13–60)
LYMPHOCYTES # BLD AUTO: 1.11 10*3/MM3 (ref 0.7–3.1)
LYMPHOCYTES NFR BLD AUTO: 20.5 % (ref 19.6–45.3)
MAGNESIUM SERPL-MCNC: 2.4 MG/DL (ref 1.6–2.4)
MCH RBC QN AUTO: 33.3 PG (ref 26.6–33)
MCHC RBC AUTO-ENTMCNC: 33.7 G/DL (ref 31.5–35.7)
MCV RBC AUTO: 98.9 FL (ref 79–97)
MONOCYTES # BLD AUTO: 0.7 10*3/MM3 (ref 0.1–0.9)
MONOCYTES NFR BLD AUTO: 12.9 % (ref 5–12)
NEUTROPHILS NFR BLD AUTO: 3.31 10*3/MM3 (ref 1.7–7)
NEUTROPHILS NFR BLD AUTO: 61 % (ref 42.7–76)
NRBC BLD AUTO-RTO: 0 /100 WBC (ref 0–0.2)
PHOSPHATE SERPL-MCNC: 4.7 MG/DL (ref 2.5–4.5)
PLATELET # BLD AUTO: 177 10*3/MM3 (ref 140–450)
PMV BLD AUTO: 10.7 FL (ref 6–12)
POTASSIUM SERPL-SCNC: 4.6 MMOL/L (ref 3.5–5.2)
PROT SERPL-MCNC: 6.5 G/DL (ref 6–8.5)
RBC # BLD AUTO: 3.51 10*6/MM3 (ref 4.14–5.8)
SODIUM SERPL-SCNC: 136 MMOL/L (ref 136–145)
TRIGL SERPL-MCNC: 77 MG/DL (ref 0–150)
TSH SERPL DL<=0.05 MIU/L-ACNC: 0.55 UIU/ML (ref 0.27–4.2)
VLDLC SERPL-MCNC: 16 MG/DL (ref 5–40)
WBC # BLD AUTO: 5.42 10*3/MM3 (ref 3.4–10.8)

## 2021-04-01 PROCEDURE — 80053 COMPREHEN METABOLIC PANEL: CPT | Performed by: INTERNAL MEDICINE

## 2021-04-01 PROCEDURE — 85025 COMPLETE CBC W/AUTO DIFF WBC: CPT | Performed by: INTERNAL MEDICINE

## 2021-04-01 PROCEDURE — 82962 GLUCOSE BLOOD TEST: CPT

## 2021-04-01 PROCEDURE — 83036 HEMOGLOBIN GLYCOSYLATED A1C: CPT | Performed by: INTERNAL MEDICINE

## 2021-04-01 PROCEDURE — 80061 LIPID PANEL: CPT | Performed by: INTERNAL MEDICINE

## 2021-04-01 PROCEDURE — G0378 HOSPITAL OBSERVATION PER HR: HCPCS

## 2021-04-01 PROCEDURE — 84100 ASSAY OF PHOSPHORUS: CPT | Performed by: INTERNAL MEDICINE

## 2021-04-01 PROCEDURE — 84443 ASSAY THYROID STIM HORMONE: CPT | Performed by: INTERNAL MEDICINE

## 2021-04-01 PROCEDURE — 83735 ASSAY OF MAGNESIUM: CPT | Performed by: INTERNAL MEDICINE

## 2021-04-01 PROCEDURE — 36415 COLL VENOUS BLD VENIPUNCTURE: CPT | Performed by: INTERNAL MEDICINE

## 2021-04-01 PROCEDURE — 70551 MRI BRAIN STEM W/O DYE: CPT

## 2021-04-01 PROCEDURE — 83690 ASSAY OF LIPASE: CPT | Performed by: INTERNAL MEDICINE

## 2021-04-01 RX ADMIN — ATORVASTATIN CALCIUM 40 MG: 20 TABLET, FILM COATED ORAL at 06:47

## 2021-04-01 RX ADMIN — ISOSORBIDE MONONITRATE 120 MG: 60 TABLET ORAL at 06:47

## 2021-04-01 RX ADMIN — CALCIUM ACETATE 1334 MG: 667 CAPSULE ORAL at 11:13

## 2021-04-01 RX ADMIN — SODIUM CHLORIDE, PRESERVATIVE FREE 10 ML: 5 INJECTION INTRAVENOUS at 09:33

## 2021-04-01 RX ADMIN — ALLOPURINOL 150 MG: 300 TABLET ORAL at 09:33

## 2021-04-01 NOTE — PLAN OF CARE
Goal Outcome Evaluation:  Plan of Care Reviewed With: patient  Progress: improving  Outcome Summary: vss. no c/o pain. no symptoms from previous day. mri today. resting well throughout shift.

## 2021-04-01 NOTE — DISCHARGE SUMMARY
Date of Admission: 3/31/2021  Date of Discharge:  4/1/2021  Primary Care Physician: Natanael Marshall MD     Discharge Diagnosis:  Active Hospital Problems    Diagnosis  POA   • Generalized weakness [R53.1]  Yes   • ESRD (end stage renal disease) (CMS/Formerly [facility]) [N18.6]  Yes   • Type 2 diabetes mellitus with renal complication (CMS/Formerly [facility]) [E11.29]  Yes   • Essential hypertension [I10]  Yes   • Aneurysm of splenic artery (CMS/Formerly [facility]) [I72.8]  Yes   • Fatigue [R53.83]  Yes      Resolved Hospital Problems   No resolved problems to display.       Presenting Problem/History of Present Illness:  ESRD (end stage renal disease) (CMS/Formerly [facility]) [N18.6]  Generalized weakness [R53.1]  Nausea and vomiting, intractability of vomiting not specified, unspecified vomiting type [R11.2]      Hospital Course:  The patient is a 70 y.o. male with a history of chronic Afib/flutter on eliquis, ESRD, HTN, Type 2 DM, and HLD who presented with generalized weakness following his second COVID-19 vaccination shot yesterday. Please see admission H&P for further details. He was observed overnight and had no evidence of infection or significant metabolic derangement other than expected issues from ESRD. His symptoms resolved and he is feeling much better today.  He is taking good PO and ambulating. Nephrology evaluated him and they have no further plans for workup or treatment inpatient-he may resume his dialysis tomorrow as scheduled.  Of note he had had an episode of blurry vision which was very brief. He notes that he sometimes gets this with dialysis or when he is otherwise ill. This did not recur and his examination was non-focal. He did have a brain MRI which showed no CVA but did show some microvascular angiopathic change consistent with amyloid angiopathy.  He does have risk of spontaneous hemorrhage with this especially since he is on eliquis. His CHADS-VASC score is 3.  Given these findings his eliquis will be held for now. I discussed the risks and  benefits of continuing anticoagulation vs discontinuation with the patient. He already has follow up with his cardiologist and he can discuss this further with him at that time.   He is medically stable and will be discharged home.    Exam Today:  Blood pressure 90/52, pulse 63, temperature 98.1 °F (36.7 °C), temperature source Oral, resp. rate 17, weight 96.2 kg (212 lb 1.3 oz), SpO2 100 %.  Constitutional:       General: He is not in acute distress.     Appearance: He does not appear ill or toxic.  HENT:      Head: Normocephalic and atraumatic.   Eyes:      Extraocular Movements: Extraocular movements intact.      Conjunctiva/sclera: Conjunctivae normal.      Pupils: Pupils are equal, round, and reactive to light.   Cardiovascular:      Rate and Rhythm: Normal rate. Irregular rhythm.     Pulses: Normal pulses.   Pulmonary:      Effort: Pulmonary effort is normal.      Breath sounds: clear to ascultation bilaterally. No wheezing.   Abdominal:      General: There is no distension.      Palpations: Abdomen is soft.      Tenderness: There is no abdominal tenderness. There is no guarding or rebound.   Musculoskeletal:         General: No swelling or tenderness.      Cervical back: Neck supple. No tenderness.   Skin:     General: Skin is warm and dry.   Neurological:      Mental Status: He is alert. He is oriented to person, place, and time.    No focal deficit present.  Psychiatric:         Mood and Affect: Mood normal.         Behavior: Behavior normal.     Procedures Performed:  MRI OF THE BRAIN WITHOUT CONTRAST- 4/1/21     CLINICAL HISTORY: Blurred vision.     TECHNIQUE: MRI of the brain was obtained with sagittal T1, axial T1,  axial FLAIR, axial T2, axial diffusion, and axial gradient echo images.     COMPARISON: Comparison is made to previous CT scan of head dated  03/31/2021.     FINDINGS: The ventricles, sulci, and cisterns are age appropriate. The  midline intracranial anatomy is within normal limits. There  are no  abnormal areas of restricted diffusion. The major intracranial flow  related signal voids are within normal limits.      Mild changes of chronic small vessel ischemic phenomena are noted. There  is a punctate focus of susceptibility artifact within the posterior  aspect of the right corona radiata, the corticomedullary interface of  the medial right temporal lobe, and within the corticomedullary  interface of the right occipital lobe compatible with chronic  microhemorrhages.      There is mild mucosal thickening within the maxillary sinuses and  ethmoid air cells. Small mucous retention cysts are identified within  the maxillary sinuses.     IMPRESSION:     No evidence for acute intracranial pathology.     There are mild changes of chronic small vessel ischemic phenomena.  Additionally, punctate areas of susceptibility artifact are identified  within the right corona radiata, and the corticomedullary interfaces of  the right occipital and medial temporal lobes compatible with chronic  microhemorrhages which are statistically likely due to underlying  amyloid.      Consults:   Consults     Date and Time Order Name Status Description    3/31/2021  4:00 PM Inpatient Nephrology Consult Completed     3/31/2021  2:40 PM [facility] (on-call MD unless specified) Details Completed     3/31/2021  2:15 PM Nephrology (on -call MD unless specified) Completed            Discharge Disposition:  Home or Self Care    Discharge Medications:     Discharge Medications      Continue These Medications      Instructions Start Date   allopurinol 300 MG tablet  Commonly known as: ZYLOPRIM   150 mg, Oral, Daily      atorvastatin 40 MG tablet  Commonly known as: LIPITOR   1 tablet, Oral, Every Morning      calcitriol 0.25 MCG capsule  Commonly known as: ROCALTROL   TK ONE C PO  QD      calcium acetate 667 MG capsule capsule  Commonly known as: PHOS BINDER)   1,334 mg, Oral, 3 Times Daily, With food       freestyle lancets   INJECT 1 INTO  THE SKIN EVERY DAY      FREESTYLE LITE test strip  Generic drug: glucose blood   USE ONE STRIP AS DIRECTED ONCE DAILY      glimepiride 1 MG tablet  Commonly known as: AMARYL   1 mg, Oral, Every Morning Before Breakfast      isosorbide mononitrate 120 MG 24 hr tablet  Commonly known as: IMDUR   120 mg, Oral, Every Morning      ROXI-YULISSA PO   1 tablet, Oral, Daily      sennosides-docusate 8.6-50 MG per tablet  Commonly known as: PERICOLACE   2 tablets, Oral, Nightly      tamsulosin 0.4 MG capsule 24 hr capsule  Commonly known as: FLOMAX   2 capsules, Oral, Nightly      torsemide 10 MG tablet  Commonly known as: DEMADEX   10 mg, Oral, 3 Times Weekly, MON WED FRI  morning      Vitamin D3 50 MCG (2000 UT) chewable tablet   1 tablet, Oral, Daily         Stop These Medications    Eliquis 5 MG tablet tablet  Generic drug: apixaban            Discharge Diet:   Diet Instructions     Diet: Consistent Carbohydrate, Renal, Cardiac, Regular; Thin      Discharge Diet:  Consistent Carbohydrate  Renal  Cardiac  Regular       Fluid Consistency: Thin          Activity at Discharge:   Activity Instructions     Activity as Tolerated            Follow-up Appointments:  Future Appointments   Date Time Provider Department Center   5/14/2021  8:30 AM Ulices Puga MD [facility] None     Additional Instructions for the Follow-ups that You Need to Schedule     Discharge Follow-up with PCP   As directed       Currently Documented PCP:    Natanael Marshall MD    PCP Phone Number:    [phone]     Follow Up Details: 1 week                Kostas Joya MD  04/01/21  11:33 EDT    Time Spent on Discharge Activities: Greater than 30 minutes.

## 2021-04-01 NOTE — PROGRESS NOTES
Discharge Planning Assessment  [facility]     Patient Name: Donald Johnson  MRN: [id number]  Today's Date: 4/1/2021    Admit Date: 3/31/2021    Discharge Needs Assessment     Row Name 04/01/21 1151       Living Environment    Lives With  spouse    Name(s) of Who Lives With Patient  spouse,  Chary Johnson [phone]    Primary Care Provided by  self    Provides Primary Care For  no one    Family Caregiver if Needed  spouse    Family Caregiver Names  spouse,  Chary Johnson [phone]       Resource/Environmental Concerns    Resource/Environmental Concerns  none    Transportation Concerns  car, none       Transition Planning    Patient/Family Anticipates Transition to  home with family    Transportation Anticipated  family or friend will provide       Discharge Needs Assessment    Readmission Within the Last 30 Days  no previous admission in last 30 days    Equipment Currently Used at Home  none    Equipment Needed After Discharge  none    Provided Post Acute Provider List?  N/A    Provided Post Acute Provider Quality & Resource List?  N/A    Patient's Choice of Community Agency(s)  Patient denies need for rehab or home health services        Discharge Plan     Row Name 04/01/21 0303       Plan    Plan  Home denies dc needs    Plan Comments  Facesheet information was verified with patient at bedside.  He plans home with his spouse, Chary Johnson # [phone], at [place].  She will transport.  He states he is IADLs.  He denies needs for home health services or rehab.  He states he can afford and he takes his medications at prescribed.  He verified his pharmacy and his PCP.......................Pooja Espinal RN        Continued Care and Services - Admitted Since 3/31/2021    Coordination has not been started for this encounter.       Expected Discharge Date and Time     Expected Discharge Date Expected Discharge Time    Apr 1, 2021         Demographic Summary     Row Name 04/01/21 0692       General Information     Admission Type  inpatient    Arrived From  home    Required Notices Provided  Observation Status Notice    Referral Source  admission list    Preferred Language  English       Contact Information    Permission Granted to Share Info With  ;family/designee    Contact Information Comments  spouse,  Chary Johnson [phone]        Functional Status     Row Name 04/01/21 1151       Functional Status    Usual Activity Tolerance  good    Current Activity Tolerance  moderate       Functional Status, IADL    Medications  independent    Meal Preparation  independent    Housekeeping  independent    Laundry  independent    Shopping  independent       Mental Status    General Appearance WDL  WDL       Mental Status Summary    Recent Changes in Mental Status/Cognitive Functioning  no changes       Employment/    Employment Status  retired        Psychosocial    No documentation.       Abuse/Neglect    No documentation.       Legal    No documentation.       Substance Abuse    No documentation.       Patient Forms    No documentation.           Pooja Espinal RN

## 2021-04-01 NOTE — PLAN OF CARE
Goal Outcome Evaluation:      Patient remains free from admission symptoms of leg cramps, nausea & vomiting, shortness of air, and blurred vision.  Neuro consult discontinued per [facility].  Patient seen by Nephrology and advised to resume dialysis at normal schedule.  Patient tolerating advanced diet from clear liquids to regular consistent carb w/o any difficulties.  IV removed.  Patient's wife to provide transportation upon discharge.

## 2021-04-01 NOTE — PROGRESS NOTES
LOS: 0 days    Patient Care Team:  Natanael Marshall MD as PCP - General (Pediatrics)  Gracie Conroy MD as Referring Physician (Nephrology)  Pollo Hernandez MD as Consulting Physician (Hematology and Oncology)  Ean Chavez MD as Consulting Physician (Urology)  Ulices Puga MD as Consulting Physician (Cardiology)  Yevgeniy Mccann MD as Consulting Physician (Nephrology)    Chief Complaint:    Chief Complaint   Patient presents with   • Shortness of Breath   • Nausea   • Vomiting     Follow-up end-stage renal disease  Subjective     Interval History:   The patient is feeling much better today, denies any chest pain or shortness of air, no fever or chills, no nausea or vomiting, no lightheadedness, he is ambulating without any difficulties today.      Review of Systems:   As noted above    Objective     Vital Signs  Temp:  [97.9 °F (36.6 °C)-98.1 °F (36.7 °C)] 98.1 °F (36.7 °C)  Heart Rate:  [54-70] 63  Resp:  [16-22] 17  BP: ()/() 90/52    Flowsheet Rows      First Filed Value   Admission Height  --   Admission Weight  96.2 kg (212 lb 1.3 oz) Documented at 03/31/2021 1255          No intake/output data recorded.  I/O last 3 completed shifts:  In: -   Out: 50 [Urine:50]    Intake/Output Summary (Last 24 hours) at 4/1/2021 1002  Last data filed at 3/31/2021 1715  Gross per 24 hour   Intake --   Output 50 ml   Net -50 ml       Physical Exam:  General Appearance: alert, oriented x 3, no acute distress, appears to be chronically ill  Skin: warm and dry  HEENT: pupils round and reactive to light, oral mucosa normal, nonicteric sclera  Neck: supple, no JVD, trachea midline no cervical or supraclavicular lymphadenopathy, no carotid bruit has tunneled dialysis catheter in the right IJ.  Lungs: CTA, unlabored breathing effort  Heart: Regular rhythm, no S3, no rub  Abdomen: soft, nontender, normoactive bowels  : no palpable bladder,  Extremities: no edema, cyanosis or clubbing,  functional AV fistula not ready for use in the left forearm with good thrill and bruit  Neuro: normal speech and mental status       Results Review:    Results from last 7 days   Lab Units 04/01/21  0540 03/31/21  1303   SODIUM mmol/L 136 137   POTASSIUM mmol/L 4.6 4.4   CHLORIDE mmol/L 99 97*   CO2 mmol/L 25.4 22.6   BUN mg/dL 38* 24*   CREATININE mg/dL 5.18* 3.58*   CALCIUM mg/dL 9.3 9.4   BILIRUBIN mg/dL 0.5 0.9   ALK PHOS U/L 116 135*   ALT (SGPT) U/L 24 29   AST (SGOT) U/L 18 20   GLUCOSE mg/dL 101* 196*       Estimated Creatinine Clearance: 16.7 mL/min (A) (by C-G formula based on SCr of 5.18 mg/dL (H)).    Results from last 7 days   Lab Units 04/01/21  0540   MAGNESIUM mg/dL 2.4   PHOSPHORUS mg/dL 4.7*             Results from last 7 days   Lab Units 04/01/21  0431 03/31/21  1303   WBC 10*3/mm3 5.42 5.14   HEMOGLOBIN g/dL 11.7* 12.5*   PLATELETS 10*3/mm3 177 183               Imaging Results (Last 24 Hours)     Procedure Component Value Units Date/Time    MRI Brain Without Contrast [[id number]] Collected: 04/01/21 0905     Updated: 04/01/21 0905    Narrative:      MRI OF THE BRAIN WITHOUT CONTRAST     CLINICAL HISTORY: Blurred vision.     TECHNIQUE: MRI of the brain was obtained with sagittal T1, axial T1,  axial FLAIR, axial T2, axial diffusion, and axial gradient echo images.     COMPARISON: Comparison is made to previous CT scan of head dated  03/31/2021.     FINDINGS: The ventricles, sulci, and cisterns are age appropriate. The  midline intracranial anatomy is within normal limits. There are no  abnormal areas of restricted diffusion. The major intracranial flow  related signal voids are within normal limits. Mild changes of chronic  small vessel ischemic phenomena are noted. There is a punctate focus of  susceptibility artifact within the posterior aspect of the right corona  radiata, the corticomedullary interface of the medial right temporal  lobe, and within the corticomedullary interface of the right  occipital  lobe compatible with chronic microhemorrhages. There is mild mucosal  thickening within the maxillary sinuses and ethmoid air cells. Small  mucous retention cysts are identified within the maxillary sinuses.       Impression:         No evidence for acute intracranial pathology.     There are mild changes of chronic small vessel ischemic phenomena.  Additionally, punctate areas of susceptibility artifact are identified  within the right corona radiata, and the corticomedullary interfaces of  the right occipital and medial temporal lobes compatible with chronic  microhemorrhages which are statistically likely due to underlying  amyloid.             CT Head Without Contrast [[id number]] Collected: 03/31/21 1419     Updated: 03/31/21 2028    Narrative:      CT HEAD WITHOUT CONTRAST     HISTORY: Lightheaded following dialysis.     COMPARISON: CT head 01/31/2021.     FINDINGS: There is no evidence of intracranial hemorrhage or of a focal  area of decreased attenuation to suggest acute infarction. The right  lateral ventricle is larger than the left which appears similar to the  prior examination. This is at the upper limits of normal in size. The  third and fourth ventricle are not enlarged. There is no evidence of  transependymal migration of fluid. Mild vascular calcification is noted.       Impression:      No acute process identified. Further evaluation could be  performed with a MRI examination of brain as indicated.           Radiation dose reduction techniques were utilized, including automated  exposure control and exposure modulation based on body size.     This report was finalized on 3/31/2021 8:25 PM by Dr. Armanod Altamirano M.D.       XR Chest 1 View [[id number]] Collected: 03/31/21 1413     Updated: 03/31/21 1453    Narrative:      ONE VIEW PORTABLE CHEST     HISTORY: Shortness of breath. Renal failure.     FINDINGS: The lungs are well-expanded and clear except for a calcified  granuloma in the right  upper lobe. There are also several calcified left  and right hilar lymph nodes showing no change from 01/31/2021. The heart  remains enlarged and a large gauge vascular catheter ends in the SVC  without change. There is no acute disease.     This report was finalized on 3/31/2021 2:50 PM by Dr. Leif Omer M.D.           allopurinol, 150 mg, Oral, Daily  atorvastatin, 40 mg, Oral, QAM  calcium acetate, 1,334 mg, Oral, TID With Meals  insulin lispro, 0-9 Units, Subcutaneous, TID AC  isosorbide mononitrate, 120 mg, Oral, QAM  sodium chloride, 10 mL, Intravenous, Q12H  tamsulosin, 0.8 mg, Oral, Nightly  torsemide, 10 mg, Oral, Once per day on Mon Wed Fri           Medication Review:   Current Facility-Administered Medications   Medication Dose Route Frequency Provider Last Rate Last Admin   • acetaminophen (TYLENOL) tablet 650 mg  650 mg Oral Q4H PRN Laron Davison MD        Or   • acetaminophen (TYLENOL) 160 MG/5ML solution 650 mg  650 mg Oral Q4H PRN Laron Davison MD        Or   • acetaminophen (TYLENOL) suppository 650 mg  650 mg Rectal Q4H PRN Laron Davison MD       • allopurinol (ZYLOPRIM) tablet 150 mg  150 mg Oral Daily Laron Davison MD   150 mg at 04/01/21 0933   • atorvastatin (LIPITOR) tablet 40 mg  40 mg Oral QAM Laron Davison MD   40 mg at 04/01/21 0647   • calcium acetate (PHOS BINDER)) capsule 1,334 mg  1,334 mg Oral TID With Meals Laron Davison MD   1,334 mg at 03/31/21 1857   • dextrose (D50W) 25 g/ 50mL Intravenous Solution 25 g  25 g Intravenous Q15 Min PRN Laron Davison MD       • dextrose (GLUTOSE) oral gel 15 g  15 g Oral Q15 Min PRN Laron Davison MD       • glucagon (human recombinant) (GLUCAGEN DIAGNOSTIC) injection 1 mg  1 mg Subcutaneous Q15 Min PRN Laron Davison MD       • HYDROcodone-acetaminophen (NORCO) 5-325 MG per tablet 2 tablet  2 tablet Oral Q6H PRN Laron Davison MD       • insulin lispro (ADMELOG) injection 0-9 Units  0-9  Units Subcutaneous TID AC Laron Davison MD       • isosorbide mononitrate (IMDUR) 24 hr tablet 120 mg  120 mg Oral QAM Laron Davison MD   120 mg at 04/01/21 0647   • nitroglycerin (NITROSTAT) SL tablet 0.4 mg  0.4 mg Sublingual Q5 Min PRN Laron Davison MD       • ondansetron (ZOFRAN) tablet 4 mg  4 mg Oral Q6H PRN Laron Davison MD        Or   • ondansetron (ZOFRAN) injection 4 mg  4 mg Intravenous Q6H PRN Laron Davison MD       • sennosides-docusate (PERICOLACE) 8.6-50 MG per tablet 2 tablet  2 tablet Oral BID PRN Laron Davison MD       • sodium chloride 0.9 % flush 10 mL  10 mL Intravenous Q12H Laron Davison MD   10 mL at 04/01/21 0933   • sodium chloride 0.9 % flush 10 mL  10 mL Intravenous PRN Laron Davison MD       • tamsulosin (FLOMAX) 24 hr capsule 0.8 mg  0.8 mg Oral Nightly Laron Davison MD   0.8 mg at 03/31/21 2037   • torsemide (DEMADEX) tablet 10 mg  10 mg Oral Once per day on Mon Wed Fri Laron Davison MD   10 mg at 03/31/21 1857       Assessment/Plan   1.  End-stage renal disease second diabetic and hypertensive glomerulosclerosis and loss of renal mass with prior partial right nephrectomy on maintenance hemodialysis every Monday, Wednesday and Friday.  He had dialysis yesterday without any difficulties his electrolytes and volume status are within acceptable range.  2.  Diabetes mellitus type 2  3.  History of splenic artery aneurysm and hemodynamically insignificant coarctation of the aorta  4.  Anemia of chronic kidney disease his hemoglobin today 11.7, no need for [place]  5.  Hypertension, controlled  6.  Dyslipidemia  7.  Chronic atrial flutter with stable ventricular rate, the patient is anticoagulated with Eliquis  8.  BPH, treated not symptomatic  9.  Generalized weakness with difficult to explain based on physical exam and labs the only possible explanation is symptoms post second Covid vaccine, his weakness completely  resolved.      Plan:  The patient could be discharged from the renal standpoint and he will have his dialysis done tomorrow as an outpatient.  I discussed that with the patient also with his nurse          Yevgeniy Mccann MD  04/01/21  10:02 EDT

## 2021-04-02 NOTE — PROGRESS NOTES
Case Management Discharge Note      Final Note: home    Provided Post Acute Provider List?: N/A  Provided Post Acute Provider Quality & Resource List?: N/A    Selected Continued Care - Discharged on 4/1/2021 Admission date: 3/31/2021 - Discharge disposition: Home or Self Care    Destination    No services have been selected for the patient.              Durable Medical Equipment    No services have been selected for the patient.              Dialysis/Infusion    No services have been selected for the patient.              Home Medical Care    No services have been selected for the patient.              Therapy    No services have been selected for the patient.              Community Resources    No services have been selected for the patient.                  Transportation Services  Private: Car    Final Discharge Disposition Code: 01 - home or self-care

## 2021-04-13 ENCOUNTER — OFFICE VISIT (OUTPATIENT)
Dept: CARDIOLOGY | Facility: CLINIC | Age: 71
End: 2021-04-13

## 2021-04-13 VITALS
BODY MASS INDEX: 29.29 KG/M2 | HEIGHT: 73 IN | HEART RATE: 56 BPM | SYSTOLIC BLOOD PRESSURE: 110 MMHG | WEIGHT: 221 LBS | DIASTOLIC BLOOD PRESSURE: 60 MMHG

## 2021-04-13 DIAGNOSIS — I10 ESSENTIAL HYPERTENSION: ICD-10-CM

## 2021-04-13 DIAGNOSIS — I48.3 TYPICAL ATRIAL FLUTTER (HCC): Primary | ICD-10-CM

## 2021-04-13 PROCEDURE — 93000 ELECTROCARDIOGRAM COMPLETE: CPT | Performed by: NURSE PRACTITIONER

## 2021-04-13 PROCEDURE — 99214 OFFICE O/P EST MOD 30 MIN: CPT | Performed by: NURSE PRACTITIONER

## 2021-04-13 NOTE — PROGRESS NOTES
Date of Office Visit: 21  Encounter Provider: STEVEN Butts  Place of Service: [facility] CARDIOLOGY  Patient Name: Donald Johnson  :1950    Chief Complaint   Patient presents with   • Typical atrial flutter   • Hypertension   • Follow-up   :     HPI: Donald Johnson is a 70 y.o. male  with history of end-stage renal disease on hemodialysis, diabetes mellitus, atrial flutter, hypertension, hypotension and bradycardia.      He is followed by Dr. Ulices Puga.  I will visit with him in follow up today and have reviewed his medical record.       He started on dialysis in 2020.        He was approved for kidney transplant his donor was to be his son.  He had a preop stress test that was normal 2020.  He then presented late 2020 with an episode of syncope.  He reported intermittent lightheadedness and low blood pressure after dialysis.  He was noted to have a heart rate of 40 in the emergency department.  Hydralazine was stopped a few weeks prior to that admission.  Carvedilol was ultimately stopped.  He was noted to be in atrial flutter and was started on Eliquis anticoagulation.  Echocardiogram revealed normal left ventricular systolic function, mildly dilated ascending aorta at 3.7 cm and moderate concentric hypertrophy.  He was discharged with a 2-week mobile telemetry which revealed average heart rate of 60.  He had fatigue following second Covid vaccine and was observed overnight.  His symptoms improved however he had an MRI of the brain which showed  microvascular angiopathic change consistent with amyloid angiopathy.  Eliquis was stopped due to risk of spontaneous bleed.         He presents for reassessment.  He gets 600 units of IV heparin a few times a week with dialysis.  He feels comfortable to remain off Eliquis.  He denies palpitation, shortness of breath, near-syncope, or syncope.  He has fatigue some days after dialysis as well  "as some cramping.      No Known Allergies        Family and social history reviewed.     ROS  All other systems were reviewed and are negative          Objective:     Vitals:    04/13/21 1040   BP: 110/60   BP Location: Right arm   Patient Position: Sitting   Pulse: 56   Weight: 100 kg (221 lb)   Height: 185.4 cm (73\")     Body mass index is 29.16 kg/m².    PHYSICAL EXAM:  Constitutional:       General: Not in acute distress.     Appearance: Well-developed. Not diaphoretic.   HENT:      Head: Normocephalic.   Pulmonary:      Effort: Pulmonary effort is normal. No respiratory distress.      Breath sounds: Normal breath sounds. No wheezing. No rhonchi. No rales.   Cardiovascular:      Normal rate. Regularly irregular rhythm.   Pulses:     Radial: 2+ bilaterally.  Skin:     General: Skin is warm and dry. There is no cyanosis.      Findings: No rash.   Neurological:      Mental Status: Alert and oriented to person, place, and time.   Psychiatric:         Behavior: Behavior normal.         Thought Content: Thought content normal.         Judgment: Judgment normal.           ECG 12 Lead    Date/Time: 4/13/2021 11:54 AM  Performed by: Glenny Howard APRN  Authorized by: Glenny Howard APRN   Comparison: compared with previous ECG   Similar to previous ECG  Rhythm: atrial flutter  Rate: bradycardic    Clinical impression: abnormal EKG              Current Outpatient Medications   Medication Sig Dispense Refill   • allopurinol (ZYLOPRIM) 300 MG tablet Take 150 mg by mouth Daily.     • atorvastatin (LIPITOR) 40 MG tablet Take 1 tablet by mouth Every Morning.     • B Complex-C-Folic Acid (ROXI-YULISSA PO) Take 1 tablet by mouth Daily.     • calcitriol (ROCALTROL) 0.25 MCG capsule TK ONE C PO  QD     • calcium acetate (PHOS BINDER,) 667 MG capsule capsule Take 1,334 mg by mouth 3 (Three) Times a Day. With food     • Cholecalciferol (Vitamin D3) 50 MCG (2000 UT) chewable tablet Chew 1 tablet Daily.     • FREESTYLE LITE test strip " "USE ONE STRIP AS DIRECTED ONCE DAILY  3   • glimepiride (AMARYL) 1 MG tablet Take 1 mg by mouth Every Morning Before Breakfast.     • isosorbide mononitrate (IMDUR) 120 MG 24 hr tablet Take 120 mg by mouth Every Morning.     • Lancets (FREESTYLE) lancets INJECT 1 INTO THE SKIN EVERY DAY     • senna-docusate sodium (SENOKOT-S) 8.6-50 MG tablet Take 2 tablets by mouth Every Night. 60 tablet 0   • tamsulosin (FLOMAX) 0.4 MG capsule 24 hr capsule Take 2 capsules by mouth Every Night.     • torsemide (DEMADEX) 10 MG tablet Take 10 mg by mouth 3 (Three) Times a Week. MON WED FRI  morning       No current facility-administered medications for this visit.     Assessment:      No diagnosis found.     Orders Placed This Encounter   Procedures   • ECG 12 Lead     This order was created via procedure documentation     Order Specific Question:   Release to patient     Answer:   Immediate         Plan:       1.  70-year-old gentleman with persistent atrial flutter.  His heart rate is controlled but he is not on AV kiley blockade.  Carvedilol was stopped due to bradycardia.  HR ranged  with an average of 60 pbm on mobile telemetry 02/2021 I will call him to discuss  -see below- I am going to discuss rhythm control strategy with EP  2.  History of hypertension but now with hypotension postdialysis so hydralazine and carvedilol have been stopped  3.  End-stage renal disease.  He has a left arm fistula.  He was approved for kidney transplant as his son was to be the donor but his surgery was canceled due to syncope/arrhythmia and now postponed with continued bradycardia  4.  Diabetes mellitus  5.  Peripheral arterial disease  6. microvascular angiopathic change consistent with amyloid angiopathy. He does have risk of spontaneous hemorrhage and for this reason eliquis is held  7.  Mildly dilated aorta measuring 3.7 cm on echo February 2021    Addendum-I discussed with Dr. Ramirez who states   \"Given his microhemorrhages on CT " "and MRI of the brain I would not use anticoagulation. Given his end-stage renal disease, there is no evidence that anticoagulation provides a net benefit.  Patients with end-stage renal disease were excluded from all of the anticoagulation trials.  Therefore, I would not push him towards anticoagulation because he there is no clear benefit and a clear increased risk.  He has atrial flutter with a controlled rate and I would not pursue rhythm control either.   There is absolutely no reason why he should not have a renal transplant based on his atrial flutter. \"  I then called and discussed with patient we do not recommend anticoagulation or rhythm control strategy.  He would like a letter created and sent to renal transplant team at [facility] with Dr. George stating that. So I created a letter.     It has been a pleasure to participate in this patient's care.      Thank you,  STEVEN Butts      **I used Dragon to dictate this note:**  "

## 2021-04-15 ENCOUNTER — TELEPHONE (OUTPATIENT)
Dept: CARDIOLOGY | Facility: CLINIC | Age: 71
End: 2021-04-15

## 2021-04-15 NOTE — TELEPHONE ENCOUNTER
Letter created in Epic faxed to Dr. George.  Fax# [phone].  Faxed confirmation received. / ROD

## 2021-05-17 ENCOUNTER — APPOINTMENT (OUTPATIENT)
Dept: CT IMAGING | Facility: HOSPITAL | Age: 71
End: 2021-05-17

## 2021-05-17 ENCOUNTER — HOSPITAL ENCOUNTER (OUTPATIENT)
Facility: HOSPITAL | Age: 71
Discharge: HOME | End: 2021-05-17
Attending: EMERGENCY MEDICINE | Admitting: EMERGENCY MEDICINE

## 2021-05-17 ENCOUNTER — APPOINTMENT (OUTPATIENT)
Dept: GENERAL RADIOLOGY | Facility: HOSPITAL | Age: 71
End: 2021-05-17

## 2021-05-17 VITALS
DIASTOLIC BLOOD PRESSURE: 74 MMHG | SYSTOLIC BLOOD PRESSURE: 167 MMHG | HEIGHT: 73 IN | RESPIRATION RATE: 22 BRPM | TEMPERATURE: 97 F | OXYGEN SATURATION: 98 % | WEIGHT: 215 LBS | HEART RATE: 55 BPM | BODY MASS INDEX: 28.49 KG/M2

## 2021-05-17 DIAGNOSIS — R11.2 NAUSEA AND VOMITING, INTRACTABILITY OF VOMITING NOT SPECIFIED, UNSPECIFIED VOMITING TYPE: Primary | ICD-10-CM

## 2021-05-17 DIAGNOSIS — R53.1 GENERALIZED WEAKNESS: ICD-10-CM

## 2021-05-17 LAB
ALBUMIN SERPL-MCNC: 4.1 G/DL (ref 3.5–5.2)
ALBUMIN/GLOB SERPL: 1.5 G/DL
ALP SERPL-CCNC: 117 U/L (ref 39–117)
ALT SERPL W P-5'-P-CCNC: 21 U/L (ref 1–41)
ANION GAP SERPL CALCULATED.3IONS-SCNC: 15.7 MMOL/L (ref 5–15)
AST SERPL-CCNC: 17 U/L (ref 1–40)
BACTERIA UR QL AUTO: NORMAL /HPF
BASOPHILS # BLD AUTO: 0.02 10*3/MM3 (ref 0–0.2)
BASOPHILS NFR BLD AUTO: 0.4 % (ref 0–1.5)
BILIRUB SERPL-MCNC: 0.7 MG/DL (ref 0–1.2)
BILIRUB UR QL STRIP: NEGATIVE
BUN SERPL-MCNC: 29 MG/DL (ref 8–23)
BUN/CREAT SERPL: 8.5 (ref 7–25)
CALCIUM SPEC-SCNC: 9.1 MG/DL (ref 8.6–10.5)
CHLORIDE SERPL-SCNC: 97 MMOL/L (ref 98–107)
CLARITY UR: CLEAR
CO2 SERPL-SCNC: 25.3 MMOL/L (ref 22–29)
COLOR UR: YELLOW
CREAT SERPL-MCNC: 3.43 MG/DL (ref 0.76–1.27)
DEPRECATED RDW RBC AUTO: 45.6 FL (ref 37–54)
EOSINOPHIL # BLD AUTO: 0.27 10*3/MM3 (ref 0–0.4)
EOSINOPHIL NFR BLD AUTO: 5.7 % (ref 0.3–6.2)
ERYTHROCYTE [DISTWIDTH] IN BLOOD BY AUTOMATED COUNT: 13.3 % (ref 12.3–15.4)
GFR SERPL CREATININE-BSD FRML MDRD: 18 ML/MIN/1.73
GLOBULIN UR ELPH-MCNC: 2.7 GM/DL
GLUCOSE BLDC GLUCOMTR-MCNC: 123 MG/DL (ref 70–130)
GLUCOSE SERPL-MCNC: 141 MG/DL (ref 65–99)
GLUCOSE UR STRIP-MCNC: ABNORMAL MG/DL
HCT VFR BLD AUTO: 35.1 % (ref 37.5–51)
HGB BLD-MCNC: 12.3 G/DL (ref 13–17.7)
HGB UR QL STRIP.AUTO: ABNORMAL
HOLD SPECIMEN: NORMAL
HYALINE CASTS UR QL AUTO: NORMAL /LPF
IMM GRANULOCYTES # BLD AUTO: 0.02 10*3/MM3 (ref 0–0.05)
IMM GRANULOCYTES NFR BLD AUTO: 0.4 % (ref 0–0.5)
KETONES UR QL STRIP: NEGATIVE
LEUKOCYTE ESTERASE UR QL STRIP.AUTO: NEGATIVE
LYMPHOCYTES # BLD AUTO: 0.69 10*3/MM3 (ref 0.7–3.1)
LYMPHOCYTES NFR BLD AUTO: 14.6 % (ref 19.6–45.3)
MAGNESIUM SERPL-MCNC: 2.1 MG/DL (ref 1.6–2.4)
MCH RBC QN AUTO: 33 PG (ref 26.6–33)
MCHC RBC AUTO-ENTMCNC: 35 G/DL (ref 31.5–35.7)
MCV RBC AUTO: 94.1 FL (ref 79–97)
MONOCYTES # BLD AUTO: 0.45 10*3/MM3 (ref 0.1–0.9)
MONOCYTES NFR BLD AUTO: 9.5 % (ref 5–12)
NEUTROPHILS NFR BLD AUTO: 3.27 10*3/MM3 (ref 1.7–7)
NEUTROPHILS NFR BLD AUTO: 69.4 % (ref 42.7–76)
NITRITE UR QL STRIP: NEGATIVE
NRBC BLD AUTO-RTO: 0 /100 WBC (ref 0–0.2)
PH UR STRIP.AUTO: >=9 [PH] (ref 5–8)
PLATELET # BLD AUTO: 144 10*3/MM3 (ref 140–450)
PMV BLD AUTO: 11.1 FL (ref 6–12)
POTASSIUM SERPL-SCNC: 3.4 MMOL/L (ref 3.5–5.2)
PROT SERPL-MCNC: 6.8 G/DL (ref 6–8.5)
PROT UR QL STRIP: ABNORMAL
QT INTERVAL: 566 MS
RBC # BLD AUTO: 3.73 10*6/MM3 (ref 4.14–5.8)
RBC # UR: NORMAL /HPF
REF LAB TEST METHOD: NORMAL
SARS-COV-2 ORF1AB RESP QL NAA+PROBE: NOT DETECTED
SODIUM SERPL-SCNC: 138 MMOL/L (ref 136–145)
SP GR UR STRIP: 1.01 (ref 1–1.03)
SQUAMOUS #/AREA URNS HPF: NORMAL /HPF
TROPONIN T SERPL-MCNC: 0.03 NG/ML (ref 0–0.03)
UROBILINOGEN UR QL STRIP: ABNORMAL
WBC # BLD AUTO: 4.72 10*3/MM3 (ref 3.4–10.8)
WBC UR QL AUTO: NORMAL /HPF
WHOLE BLOOD HOLD SPECIMEN: NORMAL
WHOLE BLOOD HOLD SPECIMEN: NORMAL

## 2021-05-17 PROCEDURE — 83735 ASSAY OF MAGNESIUM: CPT | Performed by: EMERGENCY MEDICINE

## 2021-05-17 PROCEDURE — 80053 COMPREHEN METABOLIC PANEL: CPT | Performed by: EMERGENCY MEDICINE

## 2021-05-17 PROCEDURE — 85025 COMPLETE CBC W/AUTO DIFF WBC: CPT | Performed by: EMERGENCY MEDICINE

## 2021-05-17 PROCEDURE — U0005 INFEC AGEN DETEC AMPLI PROBE: HCPCS | Performed by: EMERGENCY MEDICINE

## 2021-05-17 PROCEDURE — 81001 URINALYSIS AUTO W/SCOPE: CPT | Performed by: EMERGENCY MEDICINE

## 2021-05-17 PROCEDURE — 96375 TX/PRO/DX INJ NEW DRUG ADDON: CPT

## 2021-05-17 PROCEDURE — 93010 ELECTROCARDIOGRAM REPORT: CPT | Performed by: INTERNAL MEDICINE

## 2021-05-17 PROCEDURE — 96374 THER/PROPH/DIAG INJ IV PUSH: CPT

## 2021-05-17 PROCEDURE — 25010000002 PROMETHAZINE PER 50 MG: Performed by: EMERGENCY MEDICINE

## 2021-05-17 PROCEDURE — 99285 EMERGENCY DEPT VISIT HI MDM: CPT

## 2021-05-17 PROCEDURE — C9803 HOPD COVID-19 SPEC COLLECT: HCPCS | Performed by: EMERGENCY MEDICINE

## 2021-05-17 PROCEDURE — G0378 HOSPITAL OBSERVATION PER HR: HCPCS

## 2021-05-17 PROCEDURE — 93005 ELECTROCARDIOGRAM TRACING: CPT | Performed by: EMERGENCY MEDICINE

## 2021-05-17 PROCEDURE — U0004 COV-19 TEST NON-CDC HGH THRU: HCPCS | Performed by: EMERGENCY MEDICINE

## 2021-05-17 PROCEDURE — 82962 GLUCOSE BLOOD TEST: CPT

## 2021-05-17 PROCEDURE — 84484 ASSAY OF TROPONIN QUANT: CPT | Performed by: EMERGENCY MEDICINE

## 2021-05-17 PROCEDURE — 71045 X-RAY EXAM CHEST 1 VIEW: CPT

## 2021-05-17 PROCEDURE — 70450 CT HEAD/BRAIN W/O DYE: CPT

## 2021-05-17 RX ORDER — NITROGLYCERIN 0.4 MG/1
0.4 TABLET SUBLINGUAL
Status: DISCONTINUED | OUTPATIENT
Start: 2021-05-17 | End: 2021-05-17 | Stop reason: HOSPADM

## 2021-05-17 RX ORDER — ONDANSETRON 4 MG/1
4 TABLET, FILM COATED ORAL EVERY 6 HOURS PRN
Status: DISCONTINUED | OUTPATIENT
Start: 2021-05-17 | End: 2021-05-17 | Stop reason: HOSPADM

## 2021-05-17 RX ORDER — ONDANSETRON 2 MG/ML
4 INJECTION INTRAMUSCULAR; INTRAVENOUS EVERY 6 HOURS PRN
Status: DISCONTINUED | OUTPATIENT
Start: 2021-05-17 | End: 2021-05-17 | Stop reason: HOSPADM

## 2021-05-17 RX ORDER — UREA 10 %
3 LOTION (ML) TOPICAL NIGHTLY PRN
Status: DISCONTINUED | OUTPATIENT
Start: 2021-05-17 | End: 2021-05-17 | Stop reason: HOSPADM

## 2021-05-17 RX ORDER — SODIUM CHLORIDE 0.9 % (FLUSH) 0.9 %
10 SYRINGE (ML) INJECTION AS NEEDED
Status: DISCONTINUED | OUTPATIENT
Start: 2021-05-17 | End: 2021-05-17 | Stop reason: HOSPADM

## 2021-05-17 RX ORDER — ACETAMINOPHEN 325 MG/1
650 TABLET ORAL EVERY 4 HOURS PRN
Status: DISCONTINUED | OUTPATIENT
Start: 2021-05-17 | End: 2021-05-17 | Stop reason: HOSPADM

## 2021-05-17 RX ORDER — FAMOTIDINE 10 MG/ML
20 INJECTION, SOLUTION INTRAVENOUS ONCE
Status: COMPLETED | OUTPATIENT
Start: 2021-05-17 | End: 2021-05-17

## 2021-05-17 RX ADMIN — FAMOTIDINE 20 MG: 10 INJECTION INTRAVENOUS at 13:31

## 2021-05-17 RX ADMIN — PROMETHAZINE HYDROCHLORIDE 6.25 MG: 25 INJECTION INTRAMUSCULAR; INTRAVENOUS at 17:30

## 2021-05-17 NOTE — ED PROVIDER NOTES
EMERGENCY DEPARTMENT ENCOUNTER    CHIEF COMPLAINT  Chief Complaint: Vomiting, lightheadedness  History given by: Patient  History limited by: Nothing  Room Number: 41/41  PMD: Natanael Marshall MD  Nephrologist: Dr. Yevgeniy Corral  Cardiologist: Dr. Michelle Felder  Vascular surgeon: Dr. Sandeep Huynh    HPI:  Pt is a 70 y.o. male presents from dialysis today complaining of lightheadedness without passing out and vomiting that necessitated early termination of his dialysis treatment today at 3 hours 15 minutes out of a 4-hour treatment.  Patient denies a sense of movement, reports some blurred vision bilateral, diffuse without visual field deficit, denies chest pain, shortness of air, speech disturbance, unilateral weakness/numbness, sense of movement, does report feeling palpitations.  Patient denies abdominal pain but reports persistent nausea and has vomited in the ER at the time of exam.  Patient denies unilateral weakness, numbness, speech disturbance.    Duration: Just prior to arrival  Associated Symptoms: Bilateral blurred vision  Aggravating Factors: Dialysis treatment  Alleviating Factors: Nothing  Treatment before arrival: EMS transport    PAST MEDICAL HISTORY  Active Ambulatory Problems     Diagnosis Date Noted   • Anemia in CKD (chronic kidney disease) 02/03/2016   • Type 2 diabetes mellitus with renal complication (CMS/[facility]) 02/03/2016   • Essential hypertension 02/03/2016   • Gout 02/03/2016   • Hyperlipidemia 02/03/2016   • Aneurysm of splenic artery (CMS/[facility]) 02/03/2016   • Fatigue 02/03/2016   • Hilar mass 02/12/2016   • Anemia of chronic kidney failure, stage 4 (severe) (CMS/[facility]) 02/26/2018   • Normocytic anemia 02/26/2018   • Purpura (CMS/[facility]) 12/06/2018   • Renal mass, right 01/03/2020   • Acute blood loss anemia 01/17/2020   • Typical atrial flutter (CMS/[facility]) 01/31/2021   • Bradycardia 01/31/2021   • ESRD (end stage renal disease) (CMS/[facility]) 01/31/2021   • Syncope 01/31/2021   • Patient awaiting renal  transplant 01/31/2021   • Generalized weakness 03/31/2021     Resolved Ambulatory Problems     Diagnosis Date Noted   • No Resolved Ambulatory Problems     Past Medical History:   Diagnosis Date   • Anemia    • Anticoagulated by anticoagulation treatment    • Arthritis    • At risk for obstructive sleep apnea    • Atrial flutter (CMS/HCC)    • Cancer (CMS/HCC) 2017   • Chronic kidney disease    • Coarctation of aorta    • DDD (degenerative disc disease), lumbosacral    • Diabetes mellitus (CMS/HCC)    • H/O Chronic gout    • H/O Lung mass 2016   • History of cardiac cath    • Hypertension    • Monoclonal gammopathy 2018   • Obesity    • PAD (peripheral artery disease) (CMS/HCC)    • PONV (postoperative nausea and vomiting)    • Right kidney mass    • Splenic artery aneurysm (CMS/HCC)        PAST SURGICAL HISTORY  Past Surgical History:   Procedure Laterality Date   • ACHILLES TENDON SURGERY Right 1991   • ARTERIOVENOUS FISTULA/SHUNT SURGERY Left 3/4/2021    Procedure: LEFT ARM radialcephalic FISTULA PLACEMENT;  Surgeon: Jm Huynh MD;  Location: [facility];  Service: Vascular;  Laterality: Left;   • CARDIAC CATHETERIZATION     • CHOLECYSTECTOMY     • COLONOSCOPY     • EPIDURAL BLOCK     • MOHS SURGERY Left 07/20/2017    Cheek   • NEPHRECTOMY PARTIAL Right 1/3/2020    Procedure: RIGHT LAPAROSCOPIC PARTIAL  NEPHRECTOMY;  Surgeon: Ean Chavez MD;  Location: [facility] OR;  Service: Urology   • SKIN CANCER EXCISION  2014    Top of head x2   • TONSILLECTOMY         FAMILY HISTORY  Family History   Problem Relation Age of Onset   • Coronary artery disease Father         ARTERIOSCLEROSIS NONPREMATURE   • Heart disease Father    • Hypertension Father    • Diabetes Father    • Gallbladder disease Father    • Heart attack Father    • Hypertension Mother    • Diabetes Mother    • Heart disease Mother    • Diabetes Sister    • Heart disease Sister    • Hypertension Sister    • Diabetes Brother    •  Gallbladder disease Brother    • Heart disease Brother    • Hypertension Brother    • Malig Hyperthermia Neg Hx        SOCIAL HISTORY  Social History     Socioeconomic History   • Marital status:      Spouse name: Chary   • Number of children: 1   • Years of education: High school   • Highest education level: Not on file   Tobacco Use   • Smoking status: Passive Smoke Exposure - Never Smoker   • Smokeless tobacco: Never Used   • Tobacco comment: caffeine use - decaf coffee 1 cup daily    Vaping Use   • Vaping Use: Never used   Substance and Sexual Activity   • Alcohol use: No   • Drug use: Never   • Sexual activity: Defer       ALLERGIES  Patient has no known allergies.    REVIEW OF SYSTEMS  Review of Systems   Constitutional: Negative for chills and fever.   HENT: Negative for sore throat and trouble swallowing.    Eyes: Positive for visual disturbance (bilateral diffuse blurring).   Respiratory: Negative for cough and shortness of breath.    Cardiovascular: Positive for palpitations. Negative for chest pain and leg swelling.   Gastrointestinal: Positive for nausea and vomiting. Negative for abdominal pain and diarrhea.   Endocrine: Negative.    Genitourinary: Negative for decreased urine volume and frequency.   Musculoskeletal: Negative for neck pain.   Skin: Negative for rash.   Allergic/Immunologic: Negative.    Neurological: Positive for light-headedness. Negative for dizziness, syncope, speech difficulty, weakness and numbness.   Hematological: Negative.    Psychiatric/Behavioral: Negative.    All other systems reviewed and are negative.      PHYSICAL EXAM  ED Triage Vitals   Temp Heart Rate Resp BP SpO2   05/17/21 1226 05/17/21 1223 05/17/21 1223 05/17/21 1223 05/17/21 1223   97 °F (36.1 °C) 70 22 146/63 98 %      Temp src Heart Rate Source Patient Position BP Location FiO2 (%)   05/17/21 1226 05/17/21 1223 05/17/21 1223 -- --   Tympanic Monitor Lying         Physical Exam  Vitals and nursing note  reviewed.   Constitutional:       General: He is in acute distress.      Appearance: He is not toxic-appearing or diaphoretic.   HENT:      Head: Normocephalic and atraumatic.   Eyes:      General: No visual field deficit.  Cardiovascular:      Rate and Rhythm: Normal rate. Rhythm irregular.      Pulses: Normal pulses.           Posterior tibial pulses are 2+ on the right side and 2+ on the left side.      Heart sounds: Normal heart sounds. No murmur heard.     Pulmonary:      Effort: Pulmonary effort is normal. No respiratory distress.      Breath sounds: Normal breath sounds. No wheezing.   Abdominal:      General: Bowel sounds are normal.      Palpations: Abdomen is soft.      Tenderness: There is no abdominal tenderness. There is no guarding or rebound.   Musculoskeletal:         General: Normal range of motion.      Cervical back: Normal range of motion.   Skin:     General: Skin is warm and dry.      Capillary Refill: Capillary refill takes less than 2 seconds.   Neurological:      General: No focal deficit present.      Mental Status: He is alert and oriented to person, place, and time.      GCS: GCS eye subscore is 4. GCS verbal subscore is 5. GCS motor subscore is 6.      Cranial Nerves: No cranial nerve deficit, dysarthria or facial asymmetry.      Sensory: No sensory deficit.      Motor: No weakness or pronator drift.      Coordination: Coordination is intact.      Comments: NIHSS 0   Psychiatric:         Mood and Affect: Mood and affect normal.         LAB RESULTS  Lab Results (last 24 hours)     Procedure Component Value Units Date/Time    COVID PRE-OP / PRE-PROCEDURE SCREENING ORDER (NO ISOLATION) - Swab, Nasopharynx [[id number]]  (Normal) Collected: 05/17/21 1734    Specimen: Swab from Nasopharynx Updated: 05/17/21 2201    Narrative:      The following orders were created for panel order COVID PRE-OP / PRE-PROCEDURE SCREENING ORDER (NO ISOLATION) - Swab, Nasopharynx.  Procedure                                Abnormality         Status                     ---------                               -----------         ------                     COVID-19,APTIMA PANTHER,...[[id number]]  Normal              Final result                 Please view results for these tests on the individual orders.    COVID-19,APTIMA PANTHER,TRU IN-HOUSE, NP/OP SWAB IN UTM/VTM/[facility] TRANSPORT MEDIA,24 HR TAT - Swab, Nasopharynx [[id number]]  (Normal) Collected: 05/17/21 1734    Specimen: Swab from Nasopharynx Updated: 05/17/21 2201     COVID19 Not Detected    Narrative:      Fact sheet for providers: https://www.fda.gov/media/826872/download     Fact sheet for patients: https://www.fda.gov/media/391761/download    Test performed by RT PCR.      CBC WBC 4.7, Hgb 12.3, K 3.4, CO2 25, accucheck 120s at bedside    I ordered the above labs and reviewed the results    RADIOLOGY  CT Head Without Contrast   Final Result   No evidence for acute intracranial pathology.        No interval change is noted since the prior examination of 04/01/2021.       Incidental chronic findings as discussed in detail above.       Radiation dose reduction techniques were utilized, including automated   exposure control and exposure modulation based on body size.       This report was finalized on 5/18/2021 5:28 AM by Dr. Lizandro Vital M.D.          XR Chest 1 View   Final Result   Minimal likely scarring or atelectasis at the left base.   Borderline heart size. Tortuous aorta. Follow-up as clinically   indicated.       This report was finalized on 5/17/2021 2:06 PM by Dr. Joao Barron M.D.               I ordered the above noted radiological studies. Interpreted by radiologist. Viewed by me in PACS.       PROCEDURES  Procedures      PROGRESS AND CONSULTS  ED Course as of May 18 1731   Mon May 17, 2021   1455 Larissa with Dr. Lizandro Vital, on-call for radiology reports patient CT head significant for chronic findings, no acute disease    [TO]   1641 ED tech, Phil  reports patient with nausea and gagging post observation and attempts at p.o. intake.  He reports patient reports he is too weak to stand and is unable to ambulate in the ER today    [TO]   1718 Discussed with Dr. Emely Lucia, on-call for [facility] who is aware of patient's presentation, imaging, labs including EKG of concern and agrees to admit for further testing, treatment as needed    [TO]   1719 Discussed with Khalida, pharmacist regarding patient's need for antiemetics and he reports he feels promethazine is best choice, given EKG findings, agrees with low-dose to start    [TO]   1845 Patient reports he feels much improved in the ER today, nausea improved, has tolerated p.o. fluids and request to go home.  Discussed with Phil, ED tech who will ambulate the patient again and report back to me    [TO]   1905 Patient ambulates steadily and independently with ED tech, no signs of concerning arrythmias in the ED, reporting he feels fine and ready to go home.  Will notify Dr Lucia and dc patient for close follow up    [TO]      ED Course User Index  [TO] Leona Sanches MD     EKG          EKG time: 1244  Rhythm/Rate: Atrial flutter, rate in the 50s  P waves and IL: [place] flutter waves  QRS, axis: Left bundle branch block  ST and T waves: Nonspecific ST/T wave findings, long QT    Interpreted Contemporaneously by me, independently viewed  changed compared to prior a flutter, left bundle branch block old, QT interval increased since previous EKG 3/31/2020    Pt voices need to follow closely with PCP, Vivien, and Dr Mccann for recheck, further testing/treatment as needed.  Agrees to return to the ED for worsening of symptoms or other concerns    MEDICAL DECISION MAKING  Results were reviewed/discussed with the patient and they were also made aware of online access. Pt also made aware that some labs, such as cultures, will not be resulted during ER visit and follow up with PMD is necessary.       MARGARITA        DIAGNOSIS  Final diagnoses:   Nausea and vomiting, intractability of vomiting not specified, unspecified vomiting type   Generalized weakness       DISPOSITION  DISCHARGE    Patient discharged in stable condition.    Reviewed implications of results, diagnosis, meds, responsibility to follow up, warning signs and symptoms of possible worsening, potential complications and reasons to return to ER.    Patient/Family voiced understanding of above instructions.    Discussed plan for discharge, as there is no emergent indication for admission. Patient referred to primary care provider for BP management due to today's BP. Pt/family is agreeable and understands need for follow up and repeat testing.  Pt is aware that discharge does not mean that nothing is wrong but it indicates no emergency is present that requires admission and they must continue care with follow-up as given below or physician of their choice.     FOLLOW-UP  Natanael Marshall MD  [address]  [place] [facility] [id number] [phone]    Schedule an appointment as soon as possible for a visit in 3 days  EVEN IF WELL    Ulices Puga MD  [address]  [place] [facility] [id number] [phone]    Schedule an appointment as soon as possible for a visit in 3 days  EVEN IF WELL         Medication List      No changes were made to your prescriptions during this visit.           Latest Documented Vital Signs:  As of 17:31 EDT  BP- 167/74 HR- 55 Temp- 97 °F (36.1 °C) (Tympanic) O2 sat- 98%    --  Patient was wearing facemask when I entered the room and throughout our encounter. Full protective equipment was worn throughout this patient encounter including a face mask, eye protection and gloves. Hand hygiene was performed before donning protective equipment and after removal when leaving the room.      Leona Sanches MD  05/18/21 [id number]

## 2021-05-17 NOTE — CONSULTS
CONSULT NOTE    INTERNAL MEDICINE   [facility]       Patient Identification:  Name: Donald Johnson  Age: 70 y.o.  Sex: male  :  1950  MRN: [id number]             Date of Consultation:  21          Primary Care Physician: Natanael Marshall MD                               Requesting Physician: dr obed velasco  Reason for Consultation:intractable nausea and vomiting    Chief Complaint:  70 year old gentleman who presented to the emergency room with nausea and vomiting during his dialysis treatment; he complained of blurred vision and dizziness; he had 45 minutes left of his treatment; he denies any focal numbness, weakness or tingling; no chest pain or palpitations; he continued to have episodes of vomiting in the ED so a decision to admit was made initially    History of Present Illness:   As above      Past Medical History:  Past Medical History:   Diagnosis Date   • Anemia    • Anticoagulated by anticoagulation treatment     eliquis   • Arthritis    • At risk for obstructive sleep apnea     stop bang #5   • Atrial flutter (CMS/HCC)    • Cancer (CMS/HCC) 2017    Left cheek, nonmelanoma   • Chronic kidney disease    • Coarctation of aorta     very mild, likely not hemodynamically significant   • DDD (degenerative disc disease), lumbosacral    • Diabetes mellitus (CMS/HCC)     Type 2   • H/O Chronic gout    • H/O Lung mass    • History of cardiac cath     2013 with luminal irregularities, normal EF, normal LVEDP   • Hyperlipidemia    • Hypertension    • Monoclonal gammopathy    • Obesity    • PAD (peripheral artery disease) (CMS/HCC)    • PONV (postoperative nausea and vomiting)    • Right kidney mass    • Splenic artery aneurysm (CMS/HCC)      Past Surgical History:  Past Surgical History:   Procedure Laterality Date   • ACHILLES TENDON SURGERY Right    • ARTERIOVENOUS FISTULA/SHUNT SURGERY Left 3/4/2021    Procedure: LEFT ARM radialcephalic FISTULA PLACEMENT;  Surgeon:  Jm Huynh MD;  Location: [facility] MAIN OR;  Service: Vascular;  Laterality: Left;   • CARDIAC CATHETERIZATION     • CHOLECYSTECTOMY     • COLONOSCOPY     • EPIDURAL BLOCK     • MOHS SURGERY Left 07/20/2017    Cheek   • NEPHRECTOMY PARTIAL Right 1/3/2020    Procedure: RIGHT LAPAROSCOPIC PARTIAL  NEPHRECTOMY;  Surgeon: Ean Chavez MD;  Location: [facility] MAIN OR;  Service: Urology   • SKIN CANCER EXCISION  2014    Top of head x2   • TONSILLECTOMY        Home Meds:  (Not in a hospital admission)    Current Meds:     Current Facility-Administered Medications:   •  acetaminophen (TYLENOL) tablet 650 mg, 650 mg, Oral, Q4H PRN, Emely Lucia MD  •  melatonin tablet 3 mg, 3 mg, Oral, Nightly PRN, Emely Lucia MD  •  nitroglycerin (NITROSTAT) SL tablet 0.4 mg, 0.4 mg, Sublingual, Q5 Min PRN, Emely Lucia MD  •  ondansetron (ZOFRAN) tablet 4 mg, 4 mg, Oral, Q6H PRN **OR** ondansetron (ZOFRAN) injection 4 mg, 4 mg, Intravenous, Q6H PRN, Emely Lucia MD  •  sodium chloride 0.9 % flush 10 mL, 10 mL, Intravenous, PRN, Leona Sanches MD    Current Outpatient Medications:   •  allopurinol (ZYLOPRIM) 300 MG tablet, Take 150 mg by mouth Daily., Disp: , Rfl:   •  atorvastatin (LIPITOR) 40 MG tablet, Take 1 tablet by mouth Every Morning., Disp: , Rfl:   •  B Complex-C-Folic Acid (ROXI-YULISSA PO), Take 1 tablet by mouth Daily., Disp: , Rfl:   •  calcitriol (ROCALTROL) 0.25 MCG capsule, TK ONE C PO  QD, Disp: , Rfl:   •  calcium acetate (PHOS BINDER,) 667 MG capsule capsule, Take 1,334 mg by mouth 3 (Three) Times a Day. With food, Disp: , Rfl:   •  Cholecalciferol (Vitamin D3) 50 MCG (2000 UT) chewable tablet, Chew 1 tablet Daily., Disp: , Rfl:   •  FREESTYLE LITE test strip, USE ONE STRIP AS DIRECTED ONCE DAILY, Disp: , Rfl: 3  •  glimepiride (AMARYL) 1 MG tablet, Take 1 mg by mouth Every Morning Before Breakfast., Disp: , Rfl:   •  isosorbide mononitrate (IMDUR) 120 MG 24 hr  tablet, Take 120 mg by mouth Every Morning., Disp: , Rfl:   •  Lancets (FREESTYLE) lancets, INJECT 1 INTO THE SKIN EVERY DAY, Disp: , Rfl:   •  senna-docusate sodium (SENOKOT-S) 8.6-50 MG tablet, Take 2 tablets by mouth Every Night., Disp: 60 tablet, Rfl: 0  •  tamsulosin (FLOMAX) 0.4 MG capsule 24 hr capsule, Take 2 capsules by mouth Every Night., Disp: , Rfl:   •  torsemide (DEMADEX) 10 MG tablet, Take 10 mg by mouth 3 (Three) Times a Week. MON WED FRI  morning, Disp: , Rfl:   Allergies:  No Known Allergies  Social History:   Social History     Socioeconomic History   • Marital status:      Spouse name: Chary   • Number of children: 1   • Years of education: High school   • Highest education level: Not on file   Tobacco Use   • Smoking status: Passive Smoke Exposure - Never Smoker   • Smokeless tobacco: Never Used   • Tobacco comment: caffeine use - decaf coffee 1 cup daily    Vaping Use   • Vaping Use: Never used   Substance and Sexual Activity   • Alcohol use: No   • Drug use: Never   • Sexual activity: Defer     Family History:  Family History   Problem Relation Age of Onset   • Coronary artery disease Father         ARTERIOSCLEROSIS NONPREMATURE   • Heart disease Father    • Hypertension Father    • Diabetes Father    • Gallbladder disease Father    • Heart attack Father    • Hypertension Mother    • Diabetes Mother    • Heart disease Mother    • Diabetes Sister    • Heart disease Sister    • Hypertension Sister    • Diabetes Brother    • Gallbladder disease Brother    • Heart disease Brother    • Hypertension Brother    • Malig Hyperthermia Neg Hx           Review of Systems  See history of present illness and past medical history.  Patient denies  syncope, falls, trauma, change in vision, change in hearing, change in taste, changes in weight, changes in appetite, focal weakness, numbness, or paresthesia.  Patient denies chest pain, palpitations, dyspnea, orthopnea, PND, cough, sinus pressure,  "rhinorrhea, epistaxis, hemoptysis,  hematemesis, diarrhea, constipation or hematchezia.  Denies cold or heat intolerance, polydipsia, polyuria, polyphagia. Denies hematuria, pyuria, dysuria, hesitancy, frequency or urgency. Denies consumption of raw and under cooked meats foods or change in water source.  Denies fever, chills, sweats, night sweats.  Denies missing any routine medications. Remainder of ROS is negative.      Vitals:   /74   Pulse 55   Temp 97 °F (36.1 °C) (Tympanic)   Resp 22   Ht 185.4 cm (73\")   Wt 97.5 kg (215 lb)   SpO2 98%   BMI 28.37 kg/m²   I/O: No intake or output data in the 24 hours ending 05/17/21 1911  Exam:  General Appearance:    Alert, cooperative, no distress, appears stated age   Head:    Normocephalic, without obvious abnormality, atraumatic   Eyes:    PERRL, conjunctivae/corneas clear, EOM's intact, both eyes   Ears:    Normal external ear canals, both ears   Nose:   Nares normal, septum midline, mucosa normal, no drainage    or sinus tenderness   Throat:   Lips, tongue, gums normal; oral mucosa pink and moist   Neck:   Supple, symmetrical, trachea midline, no adenopathy;     thyroid:  no enlargement/tenderness/nodules; no carotid    bruit or JVD   Back:     Symmetric, no curvature, ROM normal, no CVA tenderness   Lungs:     Clear to auscultation bilaterally, respirations unlabored   Chest Wall:    No tenderness or deformity    Heart:    Regular rate and rhythm, S1 and S2 normal, no murmur, rub   or gallop   Abdomen:     Soft, nontender, bowel sounds active all four quadrants,     no masses, no hepatomegaly, no splenomegaly   Extremities:   Extremities normal, atraumatic, no cyanosis or edema   Pulses:   Pulses palpable in all extremities; symmetric all extremities   Skin:   Skin color normal, Skin is warm and dry,  no rashes or palpable lesions   Neurologic:   CNII-XII intact, motor strength grossly intact, sensation grossly intact to light touch, no focal deficits " noted       Data Review:  Labs in chart were reviewed.  WBC   Date Value Ref Range Status   05/17/2021 4.72 3.40 - 10.80 10*3/mm3 Final     Hemoglobin   Date Value Ref Range Status   05/17/2021 12.3 (L) 13.0 - 17.7 g/dL Final     Hematocrit   Date Value Ref Range Status   05/17/2021 35.1 (L) 37.5 - 51.0 % Final     Platelets   Date Value Ref Range Status   05/17/2021 144 140 - 450 10*3/mm3 Final     Sodium   Date Value Ref Range Status   05/17/2021 138 136 - 145 mmol/L Final     Potassium   Date Value Ref Range Status   05/17/2021 3.4 (L) 3.5 - 5.2 mmol/L Final     Chloride   Date Value Ref Range Status   05/17/2021 97 (L) 98 - 107 mmol/L Final     CO2   Date Value Ref Range Status   05/17/2021 25.3 22.0 - 29.0 mmol/L Final     BUN   Date Value Ref Range Status   05/17/2021 29 (H) 8 - 23 mg/dL Final     Creatinine   Date Value Ref Range Status   05/17/2021 3.43 (H) 0.76 - 1.27 mg/dL Final     Glucose   Date Value Ref Range Status   05/17/2021 141 (H) 65 - 99 mg/dL Final     Calcium   Date Value Ref Range Status   05/17/2021 9.1 8.6 - 10.5 mg/dL Final     Magnesium   Date Value Ref Range Status   05/17/2021 2.1 1.6 - 2.4 mg/dL Final     AST (SGOT)   Date Value Ref Range Status   05/17/2021 17 1 - 40 U/L Final     ALT (SGPT)   Date Value Ref Range Status   05/17/2021 21 1 - 41 U/L Final     Alkaline Phosphatase   Date Value Ref Range Status   05/17/2021 117 39 - 117 U/L Final               Imaging Results (Last 7 Days)     Procedure Component Value Units Date/Time    CT Head Without Contrast [[id number]] Collected: 05/17/21 1519     Updated: 05/17/21 1519    Narrative:      CT HEAD WITHOUT CONTRAST     CLINICAL HISTORY: Dizziness. Near-syncope.     CT scan of the head was obtained with 3 mm axial images. No intravenous  contrast was administered.     COMPARISON: Comparison is made to previous MRI of the brain dated  04/01/2021     FINDINGS:     The ventricles, sulci, and cisterns are stable in appearance when  compared  to the prior study of 2021. The right lateral ventricle  is larger than the left. This could be due to either anatomic variation  or a  ischemic event resulting in some white matter loss within  the right hemisphere. Otherwise, the gray-white matter differentiation  is within normal limits. The basal ganglia and thalami are unremarkable  in appearance. The posterior fossa structures are within normal limits.  Atherosclerotic changes are noted within the intracranial vasculature.     Incidental note is made of mucosal thickening within the maxillary  sinuses in addition to small mucus retention cyst. Mild mucosal  thickening is also noted within the ethmoids.       Impression:      No evidence for acute intracranial pathology.      No interval change is noted since the prior examination of 2021.     Incidental chronic findings as discussed in detail above.     Radiation dose reduction techniques were utilized, including automated  exposure control and exposure modulation based on body size.          XR Chest 1 View [[id number]] Collected: 21 1405     Updated: 21 1409    Narrative:      XR CHEST 1 VW-     HISTORY: Male who is 70 years-old,  altered mental status     TECHNIQUE: Frontal views of the chest     COMPARISON: 2021     FINDINGS: Stable appearing right-sided central venous catheter. The  heart size is borderline. Aorta is tortuous, calcified. Old  granulomatous disease is apparent. Minimal likely atelectasis or  scarring at the left base. No pleural effusion, or pneumothorax. No  acute osseous process.       Impression:      Minimal likely scarring or atelectasis at the left base.  Borderline heart size. Tortuous aorta. Follow-up as clinically  indicated.     This report was finalized on 2021 2:06 PM by Dr. Joao Barron M.D.           Past Medical History:   Diagnosis Date   • Anemia    • Anticoagulated by anticoagulation treatment     eliquis   • Arthritis     • At risk for obstructive sleep apnea     stop bang #5   • Atrial flutter (CMS/HCC)    • Cancer (CMS/HCC) 2017    Left cheek, nonmelanoma   • Chronic kidney disease    • Coarctation of aorta     very mild, likely not hemodynamically significant   • DDD (degenerative disc disease), lumbosacral    • Diabetes mellitus (CMS/HCC)     Type 2   • H/O Chronic gout    • H/O Lung mass 2016   • History of cardiac cath     7/2013 with luminal irregularities, normal EF, normal LVEDP   • Hyperlipidemia    • Hypertension    • Monoclonal gammopathy 2018   • Obesity    • PAD (peripheral artery disease) (CMS/HCC)    • PONV (postoperative nausea and vomiting)    • Right kidney mass    • Splenic artery aneurysm (CMS/HCC)        Assessment:  Active Hospital Problems    Diagnosis  POA   • Nausea and vomiting [R11.2]  Yes      Resolved Hospital Problems   No resolved problems to display.   esrd on hd  Dizziness  Pad  mgus  Anemia  Hypertension  diabetes    Plan:  The patient apparently felt better after he was seen and evaluated  He will be discharged to home and can follow up with nephrology and his pcp  Workup in the ED was negative for acute findings  D.w ED provider    Emely Lucia MD   5/17/2021  19:11 EDT

## 2021-05-17 NOTE — ED TRIAGE NOTES
Pt arrives via EMS from dialysis. States that with about 45 minutes left he started to have dizziness and vomiting. State that it is worse with position changes. Repots a hx of vertigo. Patient masked at arrival and triage staff wore all appropriate PPE during entire encounter with patient.

## 2021-05-17 NOTE — DISCHARGE INSTRUCTIONS
You are advised to follow closely with Dr. Eaton and Dr. Puga in 2-3 days for recheck, final results of lab work and imaging testing, and further testing/treatment as needed.            Please return to the emergency department immediately with chest pain,, shortness of air, abdominal pain, persistent vomiting/fever, blood in emesis or stool, lightheadedness/fainting, problems with speech, one sided weakness/numbness, new incontinence, problems with vision,  or for worsening of symptoms or other concerns.

## 2021-10-12 ENCOUNTER — OFFICE VISIT (OUTPATIENT)
Dept: CARDIOLOGY | Facility: CLINIC | Age: 71
End: 2021-10-12

## 2021-10-12 VITALS
WEIGHT: 223.6 LBS | HEART RATE: 69 BPM | SYSTOLIC BLOOD PRESSURE: 120 MMHG | HEIGHT: 73 IN | DIASTOLIC BLOOD PRESSURE: 58 MMHG | BODY MASS INDEX: 29.63 KG/M2 | OXYGEN SATURATION: 98 %

## 2021-10-12 DIAGNOSIS — N18.6 ESRD (END STAGE RENAL DISEASE) (HCC): ICD-10-CM

## 2021-10-12 DIAGNOSIS — I48.3 TYPICAL ATRIAL FLUTTER (HCC): ICD-10-CM

## 2021-10-12 DIAGNOSIS — I10 ESSENTIAL HYPERTENSION: Primary | ICD-10-CM

## 2021-10-12 PROCEDURE — 99214 OFFICE O/P EST MOD 30 MIN: CPT | Performed by: INTERNAL MEDICINE

## 2021-10-12 PROCEDURE — 93000 ELECTROCARDIOGRAM COMPLETE: CPT | Performed by: INTERNAL MEDICINE

## 2021-10-12 RX ORDER — HEPARIN SODIUM 1000 [USP'U]/ML
1000 INJECTION, SOLUTION INTRAVENOUS; SUBCUTANEOUS
COMMUNITY
Start: 2020-12-18 | End: 2021-12-17

## 2021-10-12 NOTE — PROGRESS NOTES
PATIENTINFORMATION    Date of Office Visit: 10/12/2021  Encounter Provider: Ulices Puga MD  Place of Service: [facility] CARDIOLOGY  Patient Name: Donald Johnson  : 1950    Subjective:     Encounter Date:10/12/2021      Patient ID: Donald Johnson is a 71 y.o. male.    No chief complaint on file.    HPI  Mr. Johnson is a 72 yo man with PMH of end-stage renal disease on hemodialysis, persistent atrial flutter that is rate controlled, history of hypertension and type diabetes mellitus came to cardiology clinic for follow-up visit.  He was being evaluated for transplant but eventually turned down because of newly diagnosed atrial flutter.  Atrial flutter is asymptomatic and he denies any palpitations, chest pain, shortness of breath, orthopnea, PND, extremity swelling.  He continues to be functionally active and exercises regularly today 3 times walking a mile and a half without any significant symptoms.  He was not a candidate for anticoagulation because of cerebral microangiopathy and risk of intracranial bleed.  He denies any significant recurrence of syncope except 2 times following dialysis.  No ER visit or hospitalization since his last clinic visit.      ROS   All systems reviewed and negative except as noted in HPI    Past Medical History:   Diagnosis Date   • Anemia    • Anticoagulated by anticoagulation treatment     eliquis   • Arthritis    • At risk for obstructive sleep apnea     stop bang #5   • Atrial flutter (HCC)    • Cancer (HCC) 2017    Left cheek, nonmelanoma   • Chronic kidney disease    • Coarctation of aorta     very mild, likely not hemodynamically significant   • DDD (degenerative disc disease), lumbosacral    • Diabetes mellitus (HCC)     Type 2   • H/O Chronic gout    • H/O Lung mass    • History of cardiac cath     2013 with luminal irregularities, normal EF, normal LVEDP   • Hyperlipidemia    • Hypertension    • Monoclonal gammopathy 2018   •  Obesity    • PAD (peripheral artery disease) (HCC)    • PONV (postoperative nausea and vomiting)    • Right kidney mass    • Splenic artery aneurysm (HCC)        Past Surgical History:   Procedure Laterality Date   • ACHILLES TENDON SURGERY Right 1991   • ARTERIOVENOUS FISTULA/SHUNT SURGERY Left 3/4/2021    Procedure: LEFT ARM radialcephalic FISTULA PLACEMENT;  Surgeon: Jm Huynh MD;  Location: [facility] OR;  Service: Vascular;  Laterality: Left;   • CARDIAC CATHETERIZATION     • CHOLECYSTECTOMY     • COLONOSCOPY     • EPIDURAL BLOCK     • MOHS SURGERY Left 07/20/2017    Cheek   • NEPHRECTOMY PARTIAL Right 1/3/2020    Procedure: RIGHT LAPAROSCOPIC PARTIAL  NEPHRECTOMY;  Surgeon: Ean Chavez MD;  Location: [facility] OR;  Service: Urology   • SKIN CANCER EXCISION  2014    Top of head x2   • TONSILLECTOMY         Social History     Socioeconomic History   • Marital status:      Spouse name: Chary   • Number of children: 1   • Years of education: High school   Tobacco Use   • Smoking status: Passive Smoke Exposure - Never Smoker   • Smokeless tobacco: Never Used   • Tobacco comment: caffeine use - decaf coffee 1 cup daily    Vaping Use   • Vaping Use: Never used   Substance and Sexual Activity   • Alcohol use: No   • Drug use: Never   • Sexual activity: Defer       Family History   Problem Relation Age of Onset   • Coronary artery disease Father         ARTERIOSCLEROSIS NONPREMATURE   • Heart disease Father    • Hypertension Father    • Diabetes Father    • Gallbladder disease Father    • Heart attack Father    • Hypertension Mother    • Diabetes Mother    • Heart disease Mother    • Diabetes Sister    • Heart disease Sister    • Hypertension Sister    • Diabetes Brother    • Gallbladder disease Brother    • Heart disease Brother    • Hypertension Brother    • Malig Hyperthermia Neg Hx            ECG 12 Lead    Date/Time: 10/12/2021 8:32 AM  Performed by: Ulices Puga  MD  Authorized by: Ulices Puga MD   Comparison: compared with previous ECG from 5/17/2021  Similar to previous ECG  Rhythm: atrial flutter  Rate: normal  Conduction: left bundle branch block  ST Segments: ST segments normal  T Waves: T waves normal  QRS axis: normal  Other: no other findings    Clinical impression: abnormal EKG               Objective:     There were no vitals taken for this visit. There is no height or weight on file to calculate BMI.     Constitutional:       General: Not in acute distress.     Appearance: Well-developed. Not diaphoretic.   Eyes:      Pupils: Pupils are equal, round, and reactive to light.   HENT:      Head: Normocephalic and atraumatic.   Neck:      Thyroid: No thyromegaly.   Pulmonary:      Effort: Pulmonary effort is normal. No respiratory distress.      Breath sounds: Normal breath sounds. No wheezing. No rales.   Chest:      Chest wall: Not tender to palpatation.   Cardiovascular:      Normal rate. Regular rhythm.      No gallop.   Pulses:     Intact distal pulses.   Edema:     Peripheral edema absent.   Abdominal:      General: Bowel sounds are normal. There is no distension.      Palpations: Abdomen is soft.      Tenderness: There is no guarding.   Musculoskeletal: Normal range of motion.         General: No deformity.      Cervical back: Normal range of motion and neck supple. Skin:     General: Skin is warm and dry.      Findings: No rash.   Neurological:      Mental Status: Alert and oriented to person, place, and time.      Cranial Nerves: No cranial nerve deficit.      Deep Tendon Reflexes: Reflexes are normal and symmetric.   Psychiatric:         Judgment: Judgment normal.         Review Of Data: I have reviewed recent labs and documentation      Assessment/Plan:       1.  Persistent atrial flutter that is rate controlled without AV kiley blockers  -History of bradycardia with beta-blocker and rate controlled off AV kiley blocker  -Off anticoagulation  because of MRI showing amyloid angiopathy and increased risk of bleeding  -No significant abnormality on echo done in February 2021    2.  Hypertension-off antihypertensive because of recurrent symptomatic hypotension  -Patient is also on Amaryl 120 mg p.o. daily for remote history of chest pain without any significant coronary artery disease.  Blood pressure in office today is 120/58.  I have reduced dose of Imdur to 60 mg p.o. daily    3.  End-stage renal disease on hemodialysis using left forearm AV fistula  -Transplant was declined by [facility] because of atrial flutter  -Patient wants to get second opinion in other transplant center    4.  Peripheral arterial disease-continue high intensity statin  5.  Hypercholesterolemia-on high intensity statin      Medication changes made today: Imdur reduced by half    Return to clinic in 6 months or sooner with any concerning symptoms.      Diagnosis and plan of care discussed with patient and verbalized understanding.           Ulices Puga MD  10/12/21  08:34 EDT

## 2022-03-26 ENCOUNTER — APPOINTMENT (OUTPATIENT)
Dept: GENERAL RADIOLOGY | Facility: HOSPITAL | Age: 72
End: 2022-03-26

## 2022-03-26 ENCOUNTER — HOSPITAL ENCOUNTER (EMERGENCY)
Facility: HOSPITAL | Age: 72
Discharge: HOME | End: 2022-03-26
Attending: EMERGENCY MEDICINE | Admitting: EMERGENCY MEDICINE

## 2022-03-26 VITALS
SYSTOLIC BLOOD PRESSURE: 133 MMHG | RESPIRATION RATE: 18 BRPM | HEART RATE: 52 BPM | TEMPERATURE: 98.7 F | OXYGEN SATURATION: 100 % | DIASTOLIC BLOOD PRESSURE: 60 MMHG

## 2022-03-26 DIAGNOSIS — E83.39 HYPOPHOSPHATEMIA: ICD-10-CM

## 2022-03-26 DIAGNOSIS — I95.3 HYPOTENSION OF HEMODIALYSIS: Primary | ICD-10-CM

## 2022-03-26 LAB
ALBUMIN SERPL-MCNC: 4.2 G/DL (ref 3.5–5.2)
ALBUMIN/GLOB SERPL: 1.6 G/DL
ALP SERPL-CCNC: 113 U/L (ref 39–117)
ALT SERPL W P-5'-P-CCNC: 28 U/L (ref 1–41)
ANION GAP SERPL CALCULATED.3IONS-SCNC: 13 MMOL/L (ref 5–15)
AST SERPL-CCNC: 20 U/L (ref 1–40)
BASOPHILS # BLD AUTO: 0.03 10*3/MM3 (ref 0–0.2)
BASOPHILS NFR BLD AUTO: 0.4 % (ref 0–1.5)
BILIRUB SERPL-MCNC: 0.5 MG/DL (ref 0–1.2)
BUN SERPL-MCNC: 25 MG/DL (ref 8–23)
BUN/CREAT SERPL: 5.9 (ref 7–25)
CALCIUM SPEC-SCNC: 8.6 MG/DL (ref 8.6–10.5)
CHLORIDE SERPL-SCNC: 100 MMOL/L (ref 98–107)
CO2 SERPL-SCNC: 24 MMOL/L (ref 22–29)
CREAT SERPL-MCNC: 4.24 MG/DL (ref 0.76–1.27)
DEPRECATED RDW RBC AUTO: 47.8 FL (ref 37–54)
EGFRCR SERPLBLD CKD-EPI 2021: 14.4 ML/MIN/1.73
EOSINOPHIL # BLD AUTO: 0.18 10*3/MM3 (ref 0–0.4)
EOSINOPHIL NFR BLD AUTO: 2.2 % (ref 0.3–6.2)
ERYTHROCYTE [DISTWIDTH] IN BLOOD BY AUTOMATED COUNT: 13.6 % (ref 12.3–15.4)
GLOBULIN UR ELPH-MCNC: 2.7 GM/DL
GLUCOSE SERPL-MCNC: 131 MG/DL (ref 65–99)
HCT VFR BLD AUTO: 29.7 % (ref 37.5–51)
HGB BLD-MCNC: 10.2 G/DL (ref 13–17.7)
IMM GRANULOCYTES # BLD AUTO: 0.02 10*3/MM3 (ref 0–0.05)
IMM GRANULOCYTES NFR BLD AUTO: 0.2 % (ref 0–0.5)
LYMPHOCYTES # BLD AUTO: 0.59 10*3/MM3 (ref 0.7–3.1)
LYMPHOCYTES NFR BLD AUTO: 7.3 % (ref 19.6–45.3)
MAGNESIUM SERPL-MCNC: 2 MG/DL (ref 1.6–2.4)
MCH RBC QN AUTO: 33.7 PG (ref 26.6–33)
MCHC RBC AUTO-ENTMCNC: 34.3 G/DL (ref 31.5–35.7)
MCV RBC AUTO: 98 FL (ref 79–97)
MONOCYTES # BLD AUTO: 0.62 10*3/MM3 (ref 0.1–0.9)
MONOCYTES NFR BLD AUTO: 7.7 % (ref 5–12)
NEUTROPHILS NFR BLD AUTO: 6.66 10*3/MM3 (ref 1.7–7)
NEUTROPHILS NFR BLD AUTO: 82.2 % (ref 42.7–76)
NRBC BLD AUTO-RTO: 0 /100 WBC (ref 0–0.2)
PHOSPHATE SERPL-MCNC: 1.7 MG/DL (ref 2.5–4.5)
PLATELET # BLD AUTO: 165 10*3/MM3 (ref 140–450)
PMV BLD AUTO: 10.4 FL (ref 6–12)
POTASSIUM SERPL-SCNC: 3.8 MMOL/L (ref 3.5–5.2)
PROT SERPL-MCNC: 6.9 G/DL (ref 6–8.5)
QT INTERVAL: 542 MS
RBC # BLD AUTO: 3.03 10*6/MM3 (ref 4.14–5.8)
SODIUM SERPL-SCNC: 137 MMOL/L (ref 136–145)
TROPONIN T SERPL-MCNC: 0.03 NG/ML (ref 0–0.03)
WBC NRBC COR # BLD: 8.1 10*3/MM3 (ref 3.4–10.8)

## 2022-03-26 PROCEDURE — 93010 ELECTROCARDIOGRAM REPORT: CPT | Performed by: INTERNAL MEDICINE

## 2022-03-26 PROCEDURE — 71045 X-RAY EXAM CHEST 1 VIEW: CPT

## 2022-03-26 PROCEDURE — 84100 ASSAY OF PHOSPHORUS: CPT | Performed by: EMERGENCY MEDICINE

## 2022-03-26 PROCEDURE — 80053 COMPREHEN METABOLIC PANEL: CPT | Performed by: EMERGENCY MEDICINE

## 2022-03-26 PROCEDURE — 99284 EMERGENCY DEPT VISIT MOD MDM: CPT

## 2022-03-26 PROCEDURE — 93005 ELECTROCARDIOGRAM TRACING: CPT | Performed by: EMERGENCY MEDICINE

## 2022-03-26 PROCEDURE — 83735 ASSAY OF MAGNESIUM: CPT | Performed by: EMERGENCY MEDICINE

## 2022-03-26 PROCEDURE — 84484 ASSAY OF TROPONIN QUANT: CPT | Performed by: EMERGENCY MEDICINE

## 2022-03-26 PROCEDURE — 36415 COLL VENOUS BLD VENIPUNCTURE: CPT

## 2022-03-26 PROCEDURE — 85025 COMPLETE CBC W/AUTO DIFF WBC: CPT | Performed by: EMERGENCY MEDICINE

## 2022-03-26 RX ORDER — SODIUM CHLORIDE 0.9 % (FLUSH) 0.9 %
10 SYRINGE (ML) INJECTION AS NEEDED
Status: DISCONTINUED | OUTPATIENT
Start: 2022-03-26 | End: 2022-03-26 | Stop reason: HOSPADM

## 2022-03-26 RX ADMIN — SODIUM CHLORIDE 250 ML: 9 INJECTION, SOLUTION INTRAVENOUS at 12:50

## 2022-03-26 NOTE — ED NOTES
Per [facility] Paperwork sent with patient regarding today's dialysis treatment:  0933 - pt's BP was 86/44  1027 - pt's BP was 64/27  2.7L pulled off of patient, normally pull 3.4L.  1300mL NS given to patient.     Per EMS, pt's bp was sBP 130s each time with them. Pt's BP upon being roomed in ED was 93/59.

## 2022-03-26 NOTE — ED NOTES
Pt arrives via EMS after becoming hypotensive during dialysis. Initial SBP was 60's, became dizzy and n/v. Given 4mg of zofran en route. 2.7 L of fluid pulled off during dialysis, but some fluid returned when he became hypotensive. Pt a&ox4, abc's intact, NAD noted.    Patient wearing mask during triage. RN wearing appropriate PPE during triage. Hand hygiene performed.

## 2022-03-26 NOTE — ED PROVIDER NOTES
EMERGENCY DEPARTMENT ENCOUNTER    Room Number:  22/22  Date of encounter:  3/26/2022  PCP: Natanael Marshall MD  Historian: Patient and EMS      HPI:  Chief Complaint: Low blood pressure    Context: Donald Johnson is a 71 y.o. male who presents to the ED c/o low blood pressure after dialysis.  The patient is a 71-year-old male with history of end-stage renal renal disease who became hypotensive during dialysis.  The patient had 2.7 L of fluid pulled off when he dropped his blood pressure into the 60s.  Per EMS report he was given 1300 cc of fluid and sent to the emergency room for evaluation of persistent low blood pressure.  Patient states that he just does not feel back to baseline.  He denies chest pain, shortness of breath, dizziness, abdominal pain, fevers, chills, cough or other extremity swelling.  The patient's nephrologist is Dr. Mccann      PAST MEDICAL HISTORY  Active Ambulatory Problems     Diagnosis Date Noted   • Anemia in CKD (chronic kidney disease) 02/03/2016   • Type 2 diabetes mellitus with renal complication ([facility]) 02/03/2016   • Essential hypertension 02/03/2016   • Gout 02/03/2016   • Hyperlipidemia 02/03/2016   • Aneurysm of splenic artery ([facility]) 02/03/2016   • Fatigue 02/03/2016   • Hilar mass 02/12/2016   • Anemia of chronic kidney failure, stage 4 (severe) ([facility]) 02/26/2018   • Normocytic anemia 02/26/2018   • Purpura ([facility]) 12/06/2018   • Renal mass, right 01/03/2020   • Acute blood loss anemia 01/17/2020   • Typical atrial flutter ([facility]) 01/31/2021   • Bradycardia 01/31/2021   • ESRD (end stage renal disease) ([facility]) 01/31/2021   • Syncope 01/31/2021   • Patient awaiting renal transplant 01/31/2021   • Generalized weakness 03/31/2021   • Nausea and vomiting 05/17/2021     Resolved Ambulatory Problems     Diagnosis Date Noted   • No Resolved Ambulatory Problems     Past Medical History:   Diagnosis Date   • Anemia    • Anticoagulated by anticoagulation treatment    • Arthritis    • At risk  for obstructive sleep apnea    • Atrial flutter (HCC)    • Cancer (HCC) 2017   • Chronic kidney disease    • Coarctation of aorta    • DDD (degenerative disc disease), lumbosacral    • Diabetes mellitus (HCC)    • H/O Chronic gout    • H/O Lung mass 2016   • History of cardiac cath    • Hypertension    • Monoclonal gammopathy 2018   • Obesity    • PAD (peripheral artery disease) (HCC)    • PONV (postoperative nausea and vomiting)    • Right kidney mass    • Splenic artery aneurysm (HCC)          PAST SURGICAL HISTORY  Past Surgical History:   Procedure Laterality Date   • ACHILLES TENDON SURGERY Right 1991   • ARTERIOVENOUS FISTULA/SHUNT SURGERY Left 3/4/2021    Procedure: LEFT ARM radialcephalic FISTULA PLACEMENT;  Surgeon: Jm Huynh MD;  Location: [facility] OR;  Service: Vascular;  Laterality: Left;   • CARDIAC CATHETERIZATION     • CHOLECYSTECTOMY     • COLONOSCOPY     • EPIDURAL BLOCK     • MOHS SURGERY Left 07/20/2017    Cheek   • NEPHRECTOMY PARTIAL Right 1/3/2020    Procedure: RIGHT LAPAROSCOPIC PARTIAL  NEPHRECTOMY;  Surgeon: Ean Chavez MD;  Location: [facility] OR;  Service: Urology   • SKIN CANCER EXCISION  2014    Top of head x2   • TONSILLECTOMY           FAMILY HISTORY  Family History   Problem Relation Age of Onset   • Coronary artery disease Father         ARTERIOSCLEROSIS NONPREMATURE   • Heart disease Father    • Hypertension Father    • Diabetes Father    • Gallbladder disease Father    • Heart attack Father    • Hypertension Mother    • Diabetes Mother    • Heart disease Mother    • Diabetes Sister    • Heart disease Sister    • Hypertension Sister    • Diabetes Brother    • Gallbladder disease Brother    • Heart disease Brother    • Hypertension Brother    • Malig Hyperthermia Neg Hx          SOCIAL HISTORY  Social History     Socioeconomic History   • Marital status:      Spouse name: Chary   • Number of children: 1   • Years of education: High school   Tobacco  Use   • Smoking status: Passive Smoke Exposure - Never Smoker   • Smokeless tobacco: Never Used   • Tobacco comment: caffeine use - decaf coffee 1 cup daily    Vaping Use   • Vaping Use: Never used   Substance and Sexual Activity   • Alcohol use: No   • Drug use: Never   • Sexual activity: Defer         ALLERGIES  Patient has no known allergies.        REVIEW OF SYSTEMS  Review of Systems       All systems reviewed and negative except for those discussed in HPI.     PHYSICAL EXAM    I have reviewed the triage vital signs and nursing notes.    ED Triage Vitals [03/26/22 1231]   Temp Heart Rate Resp BP SpO2   98.7 °F (37.1 °C) 73 18 142/68 99 %      Temp src Heart Rate Source Patient Position BP Location FiO2 (%)   Tympanic Monitor -- -- --       GENERAL: 71-year-old well developed, well nourished in no acute distress  HENT: NCAT, neck supple, trachea midline  EYES: no scleral icterus, PERRL, normal conjunctivae  CV: Irregular in the 70s  RESPIRATORY: unlabored effort, CTAB  ABDOMEN: soft, nontender, nondistended, bowel sounds present  MUSCULOSKELETAL: no gross deformity, no pedal edema, no calf tenderness  NEURO: alert,  sensory and motor function of extremities intact, speech clear, mental status normal  SKIN: warm, dry, no rash  PSYCH:  Appropriate mood and affect      PPE  Pt does not present with symptoms for COVID19; however, I was wearing a N95 mask and goggles throughout all patient interaction.    Vital signs and nursing notes reviewed.      LAB RESULTS  Recent Results (from the past 24 hour(s))   Comprehensive Metabolic Panel    Collection Time: 03/26/22 12:46 PM    Specimen: Blood   Result Value Ref Range    Glucose 131 (H) 65 - 99 mg/dL    BUN 25 (H) 8 - 23 mg/dL    Creatinine 4.24 (H) 0.76 - 1.27 mg/dL    Sodium 137 136 - 145 mmol/L    Potassium 3.8 3.5 - 5.2 mmol/L    Chloride 100 98 - 107 mmol/L    CO2 24.0 22.0 - 29.0 mmol/L    Calcium 8.6 8.6 - 10.5 mg/dL    Total Protein 6.9 6.0 - 8.5 g/dL    Albumin  4.20 3.50 - 5.20 g/dL    ALT (SGPT) 28 1 - 41 U/L    AST (SGOT) 20 1 - 40 U/L    Alkaline Phosphatase 113 39 - 117 U/L    Total Bilirubin 0.5 0.0 - 1.2 mg/dL    Globulin 2.7 gm/dL    A/G Ratio 1.6 g/dL    BUN/Creatinine Ratio 5.9 (L) 7.0 - 25.0    Anion Gap 13.0 5.0 - 15.0 mmol/L    eGFR 14.4 (L) >60.0 mL/min/1.73   Troponin    Collection Time: 03/26/22 12:46 PM    Specimen: Blood   Result Value Ref Range    Troponin T 0.026 0.000 - 0.030 ng/mL   CBC Auto Differential    Collection Time: 03/26/22 12:46 PM    Specimen: Blood   Result Value Ref Range    WBC 8.10 3.40 - 10.80 10*3/mm3    RBC 3.03 (L) 4.14 - 5.80 10*6/mm3    Hemoglobin 10.2 (L) 13.0 - 17.7 g/dL    Hematocrit 29.7 (L) 37.5 - 51.0 %    MCV 98.0 (H) 79.0 - 97.0 fL    MCH 33.7 (H) 26.6 - 33.0 pg    MCHC 34.3 31.5 - 35.7 g/dL    RDW 13.6 12.3 - 15.4 %    RDW-SD 47.8 37.0 - 54.0 fl    MPV 10.4 6.0 - 12.0 fL    Platelets 165 140 - 450 10*3/mm3    Neutrophil % 82.2 (H) 42.7 - 76.0 %    Lymphocyte % 7.3 (L) 19.6 - 45.3 %    Monocyte % 7.7 5.0 - 12.0 %    Eosinophil % 2.2 0.3 - 6.2 %    Basophil % 0.4 0.0 - 1.5 %    Immature Grans % 0.2 0.0 - 0.5 %    Neutrophils, Absolute 6.66 1.70 - 7.00 10*3/mm3    Lymphocytes, Absolute 0.59 (L) 0.70 - 3.10 10*3/mm3    Monocytes, Absolute 0.62 0.10 - 0.90 10*3/mm3    Eosinophils, Absolute 0.18 0.00 - 0.40 10*3/mm3    Basophils, Absolute 0.03 0.00 - 0.20 10*3/mm3    Immature Grans, Absolute 0.02 0.00 - 0.05 10*3/mm3    nRBC 0.0 0.0 - 0.2 /100 WBC   ECG 12 Lead    Collection Time: 03/26/22 12:46 PM   Result Value Ref Range    QT Interval 542 ms   Magnesium    Collection Time: 03/26/22 12:49 PM    Specimen: Blood   Result Value Ref Range    Magnesium 2.0 1.6 - 2.4 mg/dL   Phosphorus    Collection Time: 03/26/22 12:49 PM    Specimen: Blood   Result Value Ref Range    Phosphorus 1.7 (C) 2.5 - 4.5 mg/dL       Ordered the above labs and independently reviewed the results.        RADIOLOGY  XR Chest 1 View    Result Date:  3/26/2022  PORTABLE CHEST 03/26/2022 AT 12:51 PM  CLINICAL HISTORY: Dyspnea  Compared to a previous chest dated 05/17/2021.  The lungs are fairly well-expanded and appear free of focal infiltrates. There are no pleural effusions. The heart is mildly enlarged and unchanged. Multiple mildly enlarged bilateral calcified hilar lymph nodes are noted. There is also a large calcified lymph node in the AP window.  IMPRESSIONS: No evidence of acute disease within the chest.  This report was finalized on 3/26/2022 1:47 PM by Dr. Darren Pa M.D.        I ordered the above noted radiological studies. Independently reviewed by me and discussed with radiologist.  See dictation above for official radiology interpretation.      PROCEDURES    Procedures        MEDICATIONS GIVEN IN ER    Medications   sodium chloride 0.9 % flush 10 mL (has no administration in time range)   sodium chloride 0.9 % bolus 250 mL (0 mL Intravenous Stopped 3/26/22 1320)         PROGRESS, DATA ANALYSIS, CONSULTS, AND MEDICAL DECISION MAKING    All labs have been independently reviewed by me.  All radiology studies have been reviewed by me and discussed with radiologist dictating report.   EKG's independently reviewed by me.  Discussion below represents my analysis of pertinent findings related to patient's condition, differential diagnosis, treatment plan and final disposition.      ED Course as of 03/26/22 1618   Sat Mar 26, 2022   1248 The patient's blood pressure still 93/59 and thus I will give him another 250 cc bolus while obtaining labs, chest x-ray, EKG and placing him on the monitor. [GP]   1248 EKG    EKG time: 1246  Rhythm/Rate: Atrial flutter at 71  PVCs  No Acute Ischemia  Non-Specific ST-T changes    Similar compared to prior on 5/17/2021    Interpreted Contemporaneously by me.  Independently viewed by me     [GP]   1351 Chest x-ray is negative acute. [GP]   1502 The patient's blood pressure has remained over 100 for the past 2 hours.  He  states he is feeling better.  Currently awaiting nephrology callback. [GP]   1613 On repeat examination the patient's blood pressure is still stable at 130/60's.  He states he now feels fine.  I just spoke with Dr. Watts from nephrology who feels the patient is stable for discharge home and outpatient follow-up.  The patient states he feels fine and that his blood pressure has been stable for the last 3 hours and that he wants to go home now.  Thus I will discharge him home. [GP]      ED Course User Index  [GP] Angel Carreon MD           AS OF 16:18 EDT VITALS:    BP - 126/67  HR - 52  TEMP - 98.7 °F (37.1 °C) (Tympanic)  02 SATS - 98%        DIAGNOSIS  Final diagnoses:   Hypotension of hemodialysis   Hypophosphatemia         DISPOSITION  DISCHARGE    Patient discharged in stable condition.    Reviewed implications of results, diagnosis, meds, responsibility to follow up, warning signs and symptoms of possible worsening, potential complications and reasons to return to ER, including dizziness, lightheadedness, passing out, chest pain, shortness of breath or worsening symptoms.    Patient/Family voiced understanding of above instructions.    Discussed plan for discharge, as there is no emergent indication for admission.  Pt/family is agreeable and understands need for follow up and repeat testing.  Pt is aware that discharge does not mean that nothing is wrong but it indicates no emergency is present and they must continue care with follow-up as given below or physician of their choice.     FOLLOW-UP  Yevgeniy Mccann MD  [address]  [place] [address]  [phone]    In 2 days  For recheck              EMR Dragon/Transcription disclaimer:   Much of this encounter note is an electronic transcription/translation of spoken language to printed text.        Angel Carreon MD  03/26/22 3209

## 2022-03-26 NOTE — DISCHARGE INSTRUCTIONS
Go home and rest.  Follow-up with  and dialysis on Monday as scheduled.  Return to the emergency room if worse.

## 2022-03-26 NOTE — CONSULTS
[facility] Consult Note      Patient Name: Donald Johnson  : 1950  MRN: [id number]  Primary Care Physician:  Natanael Marshall MD  Referring Physician: No ref. provider found  Date of admission: 3/26/2022    Subjective     Reason for Consult: ESRD on hemodialysis    HPI:   Donald Johnson is a 71 y.o. male with underlying ESRD on hemodialysis on a  schedule at [facility], did not get his full treatment done yesterday and came back for redo treatment today.  Unfortunately, during ultrafiltration of up to 2.7 L, patient developed refractory hypotension.  He denies any dizziness or syncope.  He received 1.3 L of fluid back but despite that there was not a significant improvement in his BP and therefore it was decided to send patient over to the emergency room for evaluation.  In the ER, he was borderline hypotensive and received another 500 cc bolus.  He then recovered his blood pressure and has been feeling great otherwise.  He was monitored for a few hours and was feeling good and wants to go home with outpatient follow-up    Review of Systems:   14 point review of systems is otherwise negative except for mentioned above on HPI    Personal History     Past Medical History:   Diagnosis Date   • Anemia    • Anticoagulated by anticoagulation treatment     eliquis   • Arthritis    • At risk for obstructive sleep apnea     stop bang #5   • Atrial flutter (HCC)    • Cancer (HCC) 2017    Left cheek, nonmelanoma   • Chronic kidney disease    • Coarctation of aorta     very mild, likely not hemodynamically significant   • DDD (degenerative disc disease), lumbosacral    • Diabetes mellitus (HCC)     Type 2   • H/O Chronic gout    • H/O Lung mass    • History of cardiac cath     2013 with luminal irregularities, normal EF, normal LVEDP   • Hyperlipidemia    • Hypertension    • Monoclonal gammopathy 2018   • Obesity    • PAD (peripheral artery disease) (HCC)     • PONV (postoperative nausea and vomiting)    • Right kidney mass    • Splenic artery aneurysm (HCC)        Past Surgical History:   Procedure Laterality Date   • ACHILLES TENDON SURGERY Right 1991   • ARTERIOVENOUS FISTULA/SHUNT SURGERY Left 3/4/2021    Procedure: LEFT ARM radialcephalic FISTULA PLACEMENT;  Surgeon: Jm Huynh MD;  Location: [facility] OR;  Service: Vascular;  Laterality: Left;   • CARDIAC CATHETERIZATION     • CHOLECYSTECTOMY     • COLONOSCOPY     • EPIDURAL BLOCK     • MOHS SURGERY Left 07/20/2017    Cheek   • NEPHRECTOMY PARTIAL Right 1/3/2020    Procedure: RIGHT LAPAROSCOPIC PARTIAL  NEPHRECTOMY;  Surgeon: Ean Chavez MD;  Location: [facility] OR;  Service: Urology   • SKIN CANCER EXCISION  2014    Top of head x2   • TONSILLECTOMY         Family History: family history includes Coronary artery disease in his father; Diabetes in his brother, father, mother, and sister; Gallbladder disease in his brother and father; Heart attack in his father; Heart disease in his brother, father, mother, and sister; Hypertension in his brother, father, mother, and sister.    Social History:  reports that he is a non-smoker but has been exposed to tobacco smoke. He has never used smokeless tobacco. He reports that he does not drink alcohol and does not use drugs.    Home Medications:  Prior to Admission medications    Medication Sig Start Date End Date Taking? Authorizing Provider   allopurinol (ZYLOPRIM) 300 MG tablet Take 150 mg by mouth Daily.    Dede Melgar MD   atorvastatin (LIPITOR) 40 MG tablet Take 1 tablet by mouth Every Morning. 7/29/13   Dede Melgar MD   B Complex-C-Folic Acid (ROXI-YULISSA PO) Take 1 tablet by mouth Daily. 11/25/20   Dede Melgar MD   calcitriol (ROCALTROL) 0.25 MCG capsule TK ONE C PO  QD 8/20/20   Dede Melgar MD   calcium acetate (PHOS BINDER,) 667 MG capsule capsule Take 1,334 mg by mouth 3 (Three) Times a Day. With  food    Dede Melgar MD   Cholecalciferol (Vitamin D3) 50 MCG (2000 UT) chewable tablet Chew 1 tablet Daily.    Dede Melgar MD FREESTYLE LITE test strip USE ONE STRIP AS DIRECTED ONCE DAILY 8/26/19   Dede Melgar MD   glimepiride (AMARYL) 1 MG tablet Take 1 mg by mouth Every Morning Before Breakfast.    Dede Melgar MD   iron sucrose in sodium chloride 0.9 % 100 mL IVPB 50 mg 1 (One) Time Per Week. 8/11/21 8/3/22  Dede Melgar MD   isosorbide mononitrate (IMDUR) 120 MG 24 hr tablet Take 60 mg by mouth Every Morning. 1/2/18   Dede Melgar MD   Lancets (ELSY) lancets INJECT 1 INTO THE SKIN EVERY DAY 11/24/19   Dede Melgar MD   Methoxy PEG-Epoetin Beta 100 MCG/0.3ML solution prefilled syringe 30 mcg. 8/4/21 8/3/22  Dede Melgar MD   senna-docusate sodium (SENOKOT-S) 8.6-50 MG tablet Take 2 tablets by mouth Every Night. 1/3/20   Ean Chavez MD   tamsulosin (FLOMAX) 0.4 MG capsule 24 hr capsule Take 2 capsules by mouth Every Night. 6/2/20   Dede Melgar MD   torsemide (DEMADEX) 10 MG tablet Take 10 mg by mouth 3 (Three) Times a Week. MON  WED  FRI  morning    Dede Melgar MD       Allergies:  No Known Allergies    Objective     Vitals:   Temp:  [98.7 °F (37.1 °C)] 98.7 °F (37.1 °C)  Heart Rate:  [51-73] 52  Resp:  [18] 18  BP: ()/(53-68) 133/60    Intake/Output Summary (Last 24 hours) at 3/26/2022 1702  Last data filed at 3/26/2022 1320  Gross per 24 hour   Intake 250 ml   Output --   Net 250 ml       Physical Exam:    General Appearance: NAD  HEENT: oral mucosa normal, nonicteric sclera  Neck: supple, no JVD  Lungs: CTA  Heart: RRR, normal S1 and S2  Abdomen: soft, nondistended  Extremities: no edema  Neuro: Awake and alert and moving all extremities    Scheduled Meds:        IV Meds:        Results Reviewed:   I have personally reviewed the results from the time of this admission to 3/26/2022 17:02 EDT      Lab Results   Component Value Date    GLUCOSE 131 (H) 03/26/2022    CALCIUM 8.6 03/26/2022     03/26/2022    K 3.8 03/26/2022    CO2 24.0 03/26/2022     03/26/2022    BUN 25 (H) 03/26/2022    CREATININE 4.24 (H) 03/26/2022    EGFRIFAFRI  04/01/2021      Comment:      <15 Indicative of kidney failure.    EGFRIFNONA 18 (L) 05/17/2021    BCR 5.9 (L) 03/26/2022    ANIONGAP 13.0 03/26/2022      Lab Results   Component Value Date    MG 2.0 03/26/2022    PHOS 1.7 (C) 03/26/2022    ALBUMIN 4.20 03/26/2022           Assessment / Plan     ASSESSMENT:  -ESRD on hemodialysis on MWF schedule  -History of hypertension, now with relative hypotension due to perhaps excessive ultrafiltration  -Type 2 diabetes    PLAN:  -Patient now recovered BP after receiving fluid back.  Discussed need to increase EDW now that patient is eating better and gaining lean mass.  He voiced understanding.  We will communicate with dialysis unit  Okay to DC from renal standpoint with outpatient follow-up in the dialysis clinic per schedule    Thank you for the consult!    Poonam Zacarias MD  03/26/22  17:02 EDT    [facility]  [phone]

## 2022-03-30 ENCOUNTER — TELEPHONE (OUTPATIENT)
Dept: CARDIOLOGY | Facility: CLINIC | Age: 72
End: 2022-03-30

## 2022-03-30 NOTE — TELEPHONE ENCOUNTER
Maico Diaz with Dialysis called and stated that they had to stop the Imdur due to low BP.  They would like the patient to be seen.    Josefina

## 2022-04-26 ENCOUNTER — OFFICE VISIT (OUTPATIENT)
Dept: CARDIOLOGY | Facility: CLINIC | Age: 72
End: 2022-04-26

## 2022-04-26 VITALS
HEIGHT: 73 IN | HEART RATE: 57 BPM | WEIGHT: 224 LBS | BODY MASS INDEX: 29.69 KG/M2 | DIASTOLIC BLOOD PRESSURE: 70 MMHG | SYSTOLIC BLOOD PRESSURE: 142 MMHG

## 2022-04-26 DIAGNOSIS — I48.92 ATRIAL FLUTTER WITH CONTROLLED RESPONSE: Primary | ICD-10-CM

## 2022-04-26 DIAGNOSIS — E78.2 MIXED HYPERLIPIDEMIA: ICD-10-CM

## 2022-04-26 PROCEDURE — 99214 OFFICE O/P EST MOD 30 MIN: CPT | Performed by: INTERNAL MEDICINE

## 2022-04-26 PROCEDURE — 93000 ELECTROCARDIOGRAM COMPLETE: CPT | Performed by: INTERNAL MEDICINE

## 2022-04-26 RX ORDER — OMEGA-3S/DHA/EPA/FISH OIL/D3 300MG-1000
CAPSULE ORAL
COMMUNITY

## 2022-04-26 NOTE — PROGRESS NOTES
PATIENTINFORMATION    Date of Office Visit: 2022  Encounter Provider: Ulices Puga MD  Place of Service: [facility] CARDIOLOGY  Patient Name: Donald Johnson  : 1950    Subjective:     Encounter Date:2022      Patient ID: Donald Johnson is a 71 y.o. male.    No chief complaint on file.    HPI  Mr. Johnson is a pleasant 72 yo man who came to clinic for regular follow-up visit.  Both imdur and torsemide discontinued because of recurrent hypotension during dialysis.  He reports overall feeling better and denies any palpitations, chest pain, shortness of breath, orthopnea, PND or significant lower extremity swelling.  He was started down for renal transplant by [facility] in  following diagnosis of atrial flutter.  He is currently getting second opinion from [facility] as well as [place].  Compliant with all treatment regimen.  Had an ER visit in 2022 for hypotension following dialysis.    Fairly active and tries to walk regularly.      ROS  All systems reviewed and negative except as noted in HPI.    Past Medical History:   Diagnosis Date   • Anemia    • Anticoagulated by anticoagulation treatment     eliquis   • Arthritis    • At risk for obstructive sleep apnea     stop bang #5   • Atrial flutter (HCC)    • Cancer (HCC) 2017    Left cheek, nonmelanoma   • Chronic kidney disease    • Coarctation of aorta     very mild, likely not hemodynamically significant   • DDD (degenerative disc disease), lumbosacral    • Diabetes mellitus (HCC)     Type 2   • H/O Chronic gout    • H/O Lung mass    • History of cardiac cath     2013 with luminal irregularities, normal EF, normal LVEDP   • Hyperlipidemia    • Hypertension    • Monoclonal gammopathy 2018   • Obesity    • PAD (peripheral artery disease) (HCC)    • PONV (postoperative nausea and vomiting)    • Right kidney mass    • Splenic artery aneurysm (HCC)        Past Surgical  History:   Procedure Laterality Date   • ACHILLES TENDON SURGERY Right 1991   • ARTERIOVENOUS FISTULA/SHUNT SURGERY Left 3/4/2021    Procedure: LEFT ARM radialcephalic FISTULA PLACEMENT;  Surgeon: Jm Huynh MD;  Location: [facility];  Service: Vascular;  Laterality: Left;   • CARDIAC CATHETERIZATION     • CHOLECYSTECTOMY     • COLONOSCOPY     • EPIDURAL BLOCK     • MOHS SURGERY Left 07/20/2017    Cheek   • NEPHRECTOMY PARTIAL Right 1/3/2020    Procedure: RIGHT LAPAROSCOPIC PARTIAL  NEPHRECTOMY;  Surgeon: Ean Chavez MD;  Location: [facility] OR;  Service: Urology   • SKIN CANCER EXCISION  2014    Top of head x2   • TONSILLECTOMY         Social History     Socioeconomic History   • Marital status:      Spouse name: Chary   • Number of children: 1   • Years of education: High school   Tobacco Use   • Smoking status: Passive Smoke Exposure - Never Smoker   • Smokeless tobacco: Never Used   • Tobacco comment: caffeine use - None   Vaping Use   • Vaping Use: Never used   Substance and Sexual Activity   • Alcohol use: No   • Drug use: Never   • Sexual activity: Defer       Family History   Problem Relation Age of Onset   • Coronary artery disease Father         ARTERIOSCLEROSIS NONPREMATURE   • Heart disease Father    • Hypertension Father    • Diabetes Father    • Gallbladder disease Father    • Heart attack Father    • Hypertension Mother    • Diabetes Mother    • Heart disease Mother    • Diabetes Sister    • Heart disease Sister    • Hypertension Sister    • Diabetes Brother    • Gallbladder disease Brother    • Heart disease Brother    • Hypertension Brother    • Malig Hyperthermia Neg Hx            ECG 12 Lead    Date/Time: 4/26/2022 8:54 AM  Performed by: Ulices Puga MD  Authorized by: Uilces Puga MD   Comparison: compared with previous ECG from 3/26/2022  Similar to previous ECG  Comparison to previous ECG: PVCs resolved on this tracing  Rhythm: atrial  "flutter  Rate: normal  Conduction: left bundle branch block  ST Segments: ST segments normal  T Waves: T waves normal  QRS axis: normal  Other: no other findings    Clinical impression: abnormal EKG               Objective:     /70 (BP Location: Right arm)   Pulse 57   Ht 185.4 cm (73\")   Wt 102 kg (224 lb)   BMI 29.55 kg/m²  Body mass index is 29.55 kg/m².     Constitutional:       General: Not in acute distress.     Appearance: Well-developed. Not diaphoretic.   Eyes:      Pupils: Pupils are equal, round, and reactive to light.   HENT:      Head: Normocephalic and atraumatic.   Neck:      Thyroid: No thyromegaly.   Pulmonary:      Effort: Pulmonary effort is normal. No respiratory distress.      Breath sounds: Normal breath sounds. No wheezing. No rales.   Chest:      Chest wall: Not tender to palpatation.   Cardiovascular:      Normal rate. Regular rhythm.      No gallop.   Pulses:     Intact distal pulses.   Edema:     Peripheral edema absent.   Abdominal:      General: Bowel sounds are normal. There is no distension.      Palpations: Abdomen is soft.      Tenderness: There is no guarding.   Musculoskeletal: Normal range of motion.         General: No deformity.      Cervical back: Normal range of motion and neck supple. Skin:     General: Skin is warm and dry.      Findings: No rash.   Neurological:      Mental Status: Alert and oriented to person, place, and time.      Cranial Nerves: No cranial nerve deficit.      Deep Tendon Reflexes: Reflexes are normal and symmetric.   Psychiatric:         Judgment: Judgment normal.         Review Of Data: I have reviewed pertinent recent labs, images and documents and pertinent findings included in HPI or assessment below.          Assessment/Plan:         1.  Persistent atrial flutter that is rate controlled without AV kiley blockers  -History of bradycardia with beta-blocker and rate controlled off AV kiley blocker  -Underlying LBBB.   -Off anticoagulation " because of MRI showing amyloid angiopathy and increased risk of bleeding  -No significant abnormality on echo done in February 2021  -Start aspirin 81 mg p.o. daily.    2.  Hypertension-o resolved  Off and hypertensives including Imdur and torsemide because of recurrent hypotension.  BP in office today is 142/70 mmHg but normal readings at home.      3.  End-stage renal disease on hemodialysis using left forearm AV fistula  -Transplant was declined by  and [facility] because of atrial flutter  -Seeing [facility] and [place].    4.  Peripheral arterial disease-continue high intensity statin  No significant symptom    5.  Hypercholesterolemia-on high intensity statin    Return to clinic in 1 year or sooner with any concerning symptoms.  Diagnosis and plan of care discussed with patient and verbalized understanding.            Your medication list          Accurate as of April 26, 2022  9:32 AM. If you have any questions, ask your nurse or doctor.            CHANGE how you take these medications      Instructions Last Dose Given Next Dose Due   Vitamin D3 10 MCG (400 UNIT) chewable tablet  What changed: Another medication with the same name was removed. Continue taking this medication, and follow the directions you see here.  Changed by: Ulices Puga MD      Chew.          CONTINUE taking these medications      Instructions Last Dose Given Next Dose Due   allopurinol 300 MG tablet  Commonly known as: ZYLOPRIM      Take 150 mg by mouth Daily.       atorvastatin 40 MG tablet  Commonly known as: LIPITOR      Take 1 tablet by mouth Every Morning.       freestyle lancets      INJECT 1 INTO THE SKIN EVERY DAY       FREESTYLE LITE test strip  Generic drug: glucose blood      USE ONE STRIP AS DIRECTED ONCE DAILY       glimepiride 1 MG tablet  Commonly known as: AMARYL      Take 1 mg by mouth Every Morning Before Breakfast.       iron sucrose in sodium chloride 0.9 % 100 mL IVPB      50  mg 1 (One) Time Per Week.       ROXI-YULISSA PO      Take 1 tablet by mouth Daily.       sennosides-docusate 8.6-50 MG per tablet  Commonly known as: PERICOLACE      Take 2 tablets by mouth Every Night.       tamsulosin 0.4 MG capsule 24 hr capsule  Commonly known as: FLOMAX      Take 2 capsules by mouth Every Night.          STOP taking these medications    calcitriol 0.25 MCG capsule  Commonly known as: ROCALTROL  Stopped by: Ulices Puga MD        calcium acetate 667 MG capsule capsule  Commonly known as: PHOS BINDER)  Stopped by: Ulices Puga MD        isosorbide mononitrate 120 MG 24 hr tablet  Commonly known as: IMDUR  Stopped by: Ulices Puga MD        Methoxy PEG-Epoetin Beta 100 MCG/0.3ML solution prefilled syringe  Stopped by: Ulices Puga MD        torsemide 10 MG tablet  Commonly known as: DEMADEX  Stopped by: MD Ulices Rosenberg MD  04/26/22  09:32 EDT

## 2022-05-04 DIAGNOSIS — Z01.818 PRE-TRANSPLANT EVALUATION FOR END STAGE RENAL DISEASE: ICD-10-CM

## 2022-05-04 DIAGNOSIS — Z01.818 PRE-TRANSPLANT EVALUATION FOR ESRD (END STAGE RENAL DISEASE): Primary | ICD-10-CM

## 2022-05-04 DIAGNOSIS — R06.02 EXERTIONAL SHORTNESS OF BREATH: ICD-10-CM

## 2022-05-24 ENCOUNTER — HOSPITAL ENCOUNTER (OUTPATIENT)
Dept: CARDIOLOGY | Facility: HOSPITAL | Age: 72
Discharge: HOME | End: 2022-05-24

## 2022-05-24 VITALS
HEART RATE: 54 BPM | DIASTOLIC BLOOD PRESSURE: 74 MMHG | HEIGHT: 72 IN | SYSTOLIC BLOOD PRESSURE: 132 MMHG | BODY MASS INDEX: 30.34 KG/M2 | OXYGEN SATURATION: 100 % | WEIGHT: 224 LBS

## 2022-05-24 DIAGNOSIS — Z01.818 PRE-TRANSPLANT EVALUATION FOR END STAGE RENAL DISEASE: ICD-10-CM

## 2022-05-24 DIAGNOSIS — R06.02 EXERTIONAL SHORTNESS OF BREATH: ICD-10-CM

## 2022-05-24 LAB
AORTIC ARCH: 2.3 CM
ASCENDING AORTA: 4.1 CM
BH CV ECHO MEAS - ACS: 1.68 CM
BH CV ECHO MEAS - AO MAX PG: 8.9 MMHG
BH CV ECHO MEAS - AO MEAN PG: 4.6 MMHG
BH CV ECHO MEAS - AO ROOT DIAM: 4 CM
BH CV ECHO MEAS - AO V2 MAX: 149.3 CM/SEC
BH CV ECHO MEAS - AO V2 VTI: 35.1 CM
BH CV ECHO MEAS - AVA(I,D): 2.48 CM2
BH CV ECHO MEAS - EDV(CUBED): 221.6 ML
BH CV ECHO MEAS - EDV(MOD-SP2): 220 ML
BH CV ECHO MEAS - EDV(MOD-SP4): 236 ML
BH CV ECHO MEAS - EF(MOD-BP): 62 %
BH CV ECHO MEAS - EF(MOD-SP2): 61.8 %
BH CV ECHO MEAS - EF(MOD-SP4): 62.3 %
BH CV ECHO MEAS - ESV(CUBED): 53.8 ML
BH CV ECHO MEAS - ESV(MOD-SP2): 84 ML
BH CV ECHO MEAS - ESV(MOD-SP4): 89 ML
BH CV ECHO MEAS - FS: 37.6 %
BH CV ECHO MEAS - IVS/LVPW: 0.98 CM
BH CV ECHO MEAS - IVSD: 1.35 CM
BH CV ECHO MEAS - LAT PEAK E' VEL: 8.3 CM/SEC
BH CV ECHO MEAS - LV DIASTOLIC VOL/BSA (35-75): 104.5 CM2
BH CV ECHO MEAS - LV MASS(C)D: 379.1 GRAMS
BH CV ECHO MEAS - LV MAX PG: 2.3 MMHG
BH CV ECHO MEAS - LV MEAN PG: 1.4 MMHG
BH CV ECHO MEAS - LV SYSTOLIC VOL/BSA (12-30): 39.4 CM2
BH CV ECHO MEAS - LV V1 MAX: 75.8 CM/SEC
BH CV ECHO MEAS - LV V1 VTI: 18.1 CM
BH CV ECHO MEAS - LVIDD: 6.1 CM
BH CV ECHO MEAS - LVIDS: 3.8 CM
BH CV ECHO MEAS - LVOT AREA: 4.8 CM2
BH CV ECHO MEAS - LVOT DIAM: 2.48 CM
BH CV ECHO MEAS - LVPWD: 1.38 CM
BH CV ECHO MEAS - MED PEAK E' VEL: 8.2 CM/SEC
BH CV ECHO MEAS - MR MAX PG: 32.8 MMHG
BH CV ECHO MEAS - MR MAX VEL: 286.4 CM/SEC
BH CV ECHO MEAS - MV A DUR: 0.09 SEC
BH CV ECHO MEAS - MV A MAX VEL: 56.6 CM/SEC
BH CV ECHO MEAS - MV DEC SLOPE: 526.2 CM/SEC2
BH CV ECHO MEAS - MV DEC TIME: 0.15 MSEC
BH CV ECHO MEAS - MV E MAX VEL: 95.5 CM/SEC
BH CV ECHO MEAS - MV E/A: 1.69
BH CV ECHO MEAS - MV MAX PG: 5.6 MMHG
BH CV ECHO MEAS - MV MEAN PG: 2.45 MMHG
BH CV ECHO MEAS - MV P1/2T: 64.2 MSEC
BH CV ECHO MEAS - MV V2 VTI: 33.2 CM
BH CV ECHO MEAS - MVA(P1/2T): 3.4 CM2
BH CV ECHO MEAS - MVA(VTI): 2.6 CM2
BH CV ECHO MEAS - PA ACC TIME: 0.13 SEC
BH CV ECHO MEAS - PA PR(ACCEL): 19.6 MMHG
BH CV ECHO MEAS - PA V2 MAX: 109.9 CM/SEC
BH CV ECHO MEAS - PULM A REVS DUR: 0.13 SEC
BH CV ECHO MEAS - PULM A REVS VEL: 39 CM/SEC
BH CV ECHO MEAS - PULM DIAS VEL: 48.2 CM/SEC
BH CV ECHO MEAS - PULM S/D: 1.04
BH CV ECHO MEAS - PULM SYS VEL: 50.2 CM/SEC
BH CV ECHO MEAS - QP/QS: 1.1
BH CV ECHO MEAS - RAP SYSTOLE: 15 MMHG
BH CV ECHO MEAS - RV MAX PG: 1.67 MMHG
BH CV ECHO MEAS - RV V1 MAX: 64.7 CM/SEC
BH CV ECHO MEAS - RV V1 VTI: 17 CM
BH CV ECHO MEAS - RVOT DIAM: 2.7 CM
BH CV ECHO MEAS - RVSP: 39 MMHG
BH CV ECHO MEAS - SI(MOD-SP2): 60.2 ML/M2
BH CV ECHO MEAS - SI(MOD-SP4): 65.1 ML/M2
BH CV ECHO MEAS - SUP REN AO DIAM: 3 CM
BH CV ECHO MEAS - SV(LVOT): 87.2 ML
BH CV ECHO MEAS - SV(MOD-SP2): 136 ML
BH CV ECHO MEAS - SV(MOD-SP4): 147 ML
BH CV ECHO MEAS - SV(RVOT): 95.9 ML
BH CV ECHO MEAS - TAPSE (>1.6): 2.2 CM
BH CV ECHO MEAS - TR MAX PG: 24.4 MMHG
BH CV ECHO MEAS - TR MAX VEL: 247 CM/SEC
BH CV ECHO MEASUREMENTS AVERAGE E/E' RATIO: 11.58
BH CV NUCLEAR PRIOR STUDY: 2
BH CV REST NUCLEAR ISOTOPE DOSE: 11.2 MCI
BH CV STRESS BP STAGE 1: NORMAL
BH CV STRESS COMMENTS STAGE 1: NORMAL
BH CV STRESS DOSE REGADENOSON STAGE 1: 0.4
BH CV STRESS DURATION MIN STAGE 1: 0
BH CV STRESS DURATION SEC STAGE 1: 10
BH CV STRESS HR STAGE 1: 83
BH CV STRESS NUCLEAR ISOTOPE DOSE: 33.8 MCI
BH CV STRESS PROTOCOL 1: NORMAL
BH CV STRESS RECOVERY BP: NORMAL MMHG
BH CV STRESS RECOVERY HR: 72 BPM
BH CV STRESS STAGE 1: 1
BH CV XLRA - RV BASE: 3.9 CM
BH CV XLRA - RV LENGTH: 9.4 CM
BH CV XLRA - RV MID: 4.7 CM
BH CV XLRA - TDI S': 16.1 CM/SEC
LEFT ATRIUM VOLUME INDEX: 48.7 ML/M2
LV EF NUC BP: 57 %
MAXIMAL PREDICTED HEART RATE: 151 BPM
MAXIMAL PREDICTED HEART RATE: 151 BPM
PERCENT MAX PREDICTED HR: 54.97 %
SINUS: 3.9 CM
STJ: 3.6 CM
STRESS BASELINE BP: NORMAL MMHG
STRESS BASELINE HR: 71 BPM
STRESS PERCENT HR: 65 %
STRESS POST EXERCISE DUR SEC: 10 SEC
STRESS POST PEAK BP: NORMAL MMHG
STRESS POST PEAK HR: 83 BPM
STRESS TARGET HR: 128 BPM
STRESS TARGET HR: 128 BPM

## 2022-05-24 PROCEDURE — 93306 TTE W/DOPPLER COMPLETE: CPT | Performed by: INTERNAL MEDICINE

## 2022-05-24 PROCEDURE — A9502 TC99M TETROFOSMIN: HCPCS | Performed by: INTERNAL MEDICINE

## 2022-05-24 PROCEDURE — 93306 TTE W/DOPPLER COMPLETE: CPT

## 2022-05-24 PROCEDURE — 0 TECHNETIUM TETROFOSMIN KIT: Performed by: INTERNAL MEDICINE

## 2022-05-24 PROCEDURE — 93018 CV STRESS TEST I&R ONLY: CPT | Performed by: INTERNAL MEDICINE

## 2022-05-24 PROCEDURE — 93016 CV STRESS TEST SUPVJ ONLY: CPT | Performed by: INTERNAL MEDICINE

## 2022-05-24 PROCEDURE — 25010000002 PERFLUTREN (DEFINITY) 8.476 MG IN SODIUM CHLORIDE (PF) 0.9 % 10 ML INJECTION: Performed by: INTERNAL MEDICINE

## 2022-05-24 PROCEDURE — 25010000002 REGADENOSON 0.4 MG/5ML SOLUTION: Performed by: INTERNAL MEDICINE

## 2022-05-24 PROCEDURE — 78452 HT MUSCLE IMAGE SPECT MULT: CPT

## 2022-05-24 PROCEDURE — 78452 HT MUSCLE IMAGE SPECT MULT: CPT | Performed by: INTERNAL MEDICINE

## 2022-05-24 PROCEDURE — 93017 CV STRESS TEST TRACING ONLY: CPT

## 2022-05-24 RX ADMIN — TETROFOSMIN 1 DOSE: 1.38 INJECTION, POWDER, LYOPHILIZED, FOR SOLUTION INTRAVENOUS at 08:33

## 2022-05-24 RX ADMIN — TETROFOSMIN 1 DOSE: 1.38 INJECTION, POWDER, LYOPHILIZED, FOR SOLUTION INTRAVENOUS at 09:33

## 2022-05-24 RX ADMIN — PERFLUTREN 1.5 ML: 6.52 INJECTION, SUSPENSION INTRAVENOUS at 08:30

## 2022-05-24 RX ADMIN — REGADENOSON 0.4 MG: 0.08 INJECTION, SOLUTION INTRAVENOUS at 09:33

## 2022-05-26 NOTE — PROGRESS NOTES
Please make patient aware of a  normal stress test and echocardiogram without significant abnormalities.  I have I have faxed my assessments to Hannah at the transplant center.  Let me know if he has any questions.

## 2022-05-31 ENCOUNTER — HOSPITAL ENCOUNTER (OUTPATIENT)
Dept: GENERAL RADIOLOGY | Facility: HOSPITAL | Age: 72
Discharge: HOME | End: 2022-05-31

## 2022-05-31 DIAGNOSIS — Z76.82 KIDNEY TRANSPLANT CANDIDATE: ICD-10-CM

## 2022-05-31 PROCEDURE — 71046 X-RAY EXAM CHEST 2 VIEWS: CPT

## 2022-11-13 ENCOUNTER — APPOINTMENT (OUTPATIENT)
Dept: CT IMAGING | Facility: HOSPITAL | Age: 72
End: 2022-11-13

## 2022-11-13 ENCOUNTER — HOSPITAL ENCOUNTER (EMERGENCY)
Facility: HOSPITAL | Age: 72
Discharge: HOME | End: 2022-11-13
Attending: EMERGENCY MEDICINE | Admitting: EMERGENCY MEDICINE

## 2022-11-13 VITALS
RESPIRATION RATE: 16 BRPM | HEIGHT: 73 IN | DIASTOLIC BLOOD PRESSURE: 80 MMHG | TEMPERATURE: 97.5 F | WEIGHT: 217 LBS | OXYGEN SATURATION: 99 % | SYSTOLIC BLOOD PRESSURE: 154 MMHG | BODY MASS INDEX: 28.76 KG/M2 | HEART RATE: 52 BPM

## 2022-11-13 DIAGNOSIS — R51.9 LEFT-SIDED HEADACHE: Primary | ICD-10-CM

## 2022-11-13 LAB
ALBUMIN SERPL-MCNC: 4.2 G/DL (ref 3.5–5.2)
ALBUMIN/GLOB SERPL: 1.5 G/DL
ALP SERPL-CCNC: 192 U/L (ref 39–117)
ALT SERPL W P-5'-P-CCNC: 18 U/L (ref 1–41)
ANION GAP SERPL CALCULATED.3IONS-SCNC: 9.3 MMOL/L (ref 5–15)
AST SERPL-CCNC: 16 U/L (ref 1–40)
BASOPHILS # BLD AUTO: 0.02 10*3/MM3 (ref 0–0.2)
BASOPHILS NFR BLD AUTO: 0.4 % (ref 0–1.5)
BILIRUB SERPL-MCNC: 0.6 MG/DL (ref 0–1.2)
BUN SERPL-MCNC: 45 MG/DL (ref 8–23)
BUN/CREAT SERPL: 6.7 (ref 7–25)
CALCIUM SPEC-SCNC: 9.4 MG/DL (ref 8.6–10.5)
CHLORIDE SERPL-SCNC: 96 MMOL/L (ref 98–107)
CO2 SERPL-SCNC: 29.7 MMOL/L (ref 22–29)
CREAT SERPL-MCNC: 6.67 MG/DL (ref 0.76–1.27)
DEPRECATED RDW RBC AUTO: 49.4 FL (ref 37–54)
EGFRCR SERPLBLD CKD-EPI 2021: 8.3 ML/MIN/1.73
EOSINOPHIL # BLD AUTO: 0.53 10*3/MM3 (ref 0–0.4)
EOSINOPHIL NFR BLD AUTO: 10.1 % (ref 0.3–6.2)
ERYTHROCYTE [DISTWIDTH] IN BLOOD BY AUTOMATED COUNT: 13.4 % (ref 12.3–15.4)
GLOBULIN UR ELPH-MCNC: 2.8 GM/DL
GLUCOSE SERPL-MCNC: 107 MG/DL (ref 65–99)
HCT VFR BLD AUTO: 38.2 % (ref 37.5–51)
HGB BLD-MCNC: 12.4 G/DL (ref 13–17.7)
IMM GRANULOCYTES # BLD AUTO: 0.02 10*3/MM3 (ref 0–0.05)
IMM GRANULOCYTES NFR BLD AUTO: 0.4 % (ref 0–0.5)
LYMPHOCYTES # BLD AUTO: 1.12 10*3/MM3 (ref 0.7–3.1)
LYMPHOCYTES NFR BLD AUTO: 21.4 % (ref 19.6–45.3)
MCH RBC QN AUTO: 32.8 PG (ref 26.6–33)
MCHC RBC AUTO-ENTMCNC: 32.5 G/DL (ref 31.5–35.7)
MCV RBC AUTO: 101.1 FL (ref 79–97)
MONOCYTES # BLD AUTO: 0.6 10*3/MM3 (ref 0.1–0.9)
MONOCYTES NFR BLD AUTO: 11.5 % (ref 5–12)
NEUTROPHILS NFR BLD AUTO: 2.94 10*3/MM3 (ref 1.7–7)
NEUTROPHILS NFR BLD AUTO: 56.2 % (ref 42.7–76)
NRBC BLD AUTO-RTO: 0 /100 WBC (ref 0–0.2)
PLATELET # BLD AUTO: 178 10*3/MM3 (ref 140–450)
PMV BLD AUTO: 10.1 FL (ref 6–12)
POTASSIUM SERPL-SCNC: 4.4 MMOL/L (ref 3.5–5.2)
PROT SERPL-MCNC: 7 G/DL (ref 6–8.5)
QT INTERVAL: 482 MS
RBC # BLD AUTO: 3.78 10*6/MM3 (ref 4.14–5.8)
SODIUM SERPL-SCNC: 135 MMOL/L (ref 136–145)
WBC NRBC COR # BLD: 5.23 10*3/MM3 (ref 3.4–10.8)

## 2022-11-13 PROCEDURE — 70450 CT HEAD/BRAIN W/O DYE: CPT

## 2022-11-13 PROCEDURE — 93010 ELECTROCARDIOGRAM REPORT: CPT | Performed by: INTERNAL MEDICINE

## 2022-11-13 PROCEDURE — 85025 COMPLETE CBC W/AUTO DIFF WBC: CPT | Performed by: PHYSICIAN ASSISTANT

## 2022-11-13 PROCEDURE — 99283 EMERGENCY DEPT VISIT LOW MDM: CPT

## 2022-11-13 PROCEDURE — 80053 COMPREHEN METABOLIC PANEL: CPT | Performed by: PHYSICIAN ASSISTANT

## 2022-11-13 PROCEDURE — 93005 ELECTROCARDIOGRAM TRACING: CPT | Performed by: EMERGENCY MEDICINE

## 2022-11-13 PROCEDURE — 93005 ELECTROCARDIOGRAM TRACING: CPT

## 2022-11-13 RX ORDER — PROCHLORPERAZINE EDISYLATE 5 MG/ML
10 INJECTION INTRAMUSCULAR; INTRAVENOUS EVERY 6 HOURS PRN
Status: DISCONTINUED | OUTPATIENT
Start: 2022-11-13 | End: 2022-11-13 | Stop reason: HOSPADM

## 2022-11-13 RX ORDER — AMLODIPINE BESYLATE 2.5 MG/1
5 TABLET ORAL DAILY
COMMUNITY

## 2022-11-13 RX ORDER — DIPHENHYDRAMINE HYDROCHLORIDE 50 MG/ML
25 INJECTION INTRAMUSCULAR; INTRAVENOUS ONCE
Status: DISCONTINUED | OUTPATIENT
Start: 2022-11-13 | End: 2022-11-13 | Stop reason: HOSPADM

## 2022-11-13 NOTE — ED PROVIDER NOTES
EMERGENCY DEPARTMENT ENCOUNTER    Room Number: 05/05  Date Seen: 11/13/2022  Time Seen: 10:59 EST  PCP: Natanael Marshall MD    Historian: patient      HISTORY OF PRESENT ILLNESS    Chief Complaint: headache    Context: Donald Johnson is a 72 y.o. male with PMHx of ESRD on dialysis who presents to the ED with c/o sharp, stabbing pain to the left side of his head for the past 4 weeks that is intermittent and only last for a few seconds at a time.  Nothing seems to make the pain better or worse.  He denies symptoms being brought on by exertion.  He reports episodes have become more frequent which is what prompted him to come in for evaluation.  He additionally has had some watering of his left eye.  He denies trauma to his head, visual disturbance, fevers, chills, loss of consciousness, chest pain, shortness of breath, nausea, vomiting, or diarrhea.  Patient does not take blood thinners.  He reports chronic bradycardia.        MEDICAL RECORD REVIEW:    [facility]    MRI BRAIN wo Contrast on 4/1/2021  There are mild changes of chronic small vessel ischemic phenomena.  Additionally, punctate areas of susceptibility artifact are identified  within the right corona radiata, and the corticomedullary interfaces of  the right occipital and medial temporal lobes compatible with chronic  microhemorrhages which are statistically likely due to underlying  Amyloid.    Patient was taken off of anticoagulation due to these findings.    PAST MEDICAL HISTORY    Active Ambulatory Problems     Diagnosis Date Noted   • Anemia in CKD (chronic kidney disease) 02/03/2016   • Type 2 diabetes mellitus with renal complication (HCC) 02/03/2016   • Essential hypertension 02/03/2016   • Gout 02/03/2016   • Hyperlipidemia 02/03/2016   • Aneurysm of splenic artery (HCC) 02/03/2016   • Fatigue 02/03/2016   • Hilar mass 02/12/2016   • Anemia of chronic kidney failure, stage 4 (severe) (HCC) 02/26/2018   • Normocytic anemia 02/26/2018   •  Purpura (HCC) 12/06/2018   • Renal mass, right 01/03/2020   • Acute blood loss anemia 01/17/2020   • Atrial flutter with controlled response ([facility]) 01/31/2021   • Bradycardia 01/31/2021   • ESRD (end stage renal disease) ([facility]) 01/31/2021   • Syncope 01/31/2021   • Patient awaiting renal transplant 01/31/2021   • Generalized weakness 03/31/2021   • Nausea and vomiting 05/17/2021     Resolved Ambulatory Problems     Diagnosis Date Noted   • No Resolved Ambulatory Problems     Past Medical History:   Diagnosis Date   • Anemia    • Anticoagulated by anticoagulation treatment    • Arthritis    • At risk for obstructive sleep apnea    • Atrial flutter (HCC)    • Cancer ([facility]) 2017   • Chronic kidney disease    • Coarctation of aorta    • DDD (degenerative disc disease), lumbosacral    • Diabetes mellitus ([facility])    • H/O Chronic gout    • H/O Lung mass 2016   • History of cardiac cath    • Hypertension    • Monoclonal gammopathy 2018   • Obesity    • PAD (peripheral artery disease) ([facility])    • PONV (postoperative nausea and vomiting)    • Right kidney mass    • Splenic artery aneurysm ([facility])          PAST SURGICAL HISTORY    Past Surgical History:   Procedure Laterality Date   • ACHILLES TENDON SURGERY Right 1991   • ARTERIOVENOUS FISTULA/SHUNT SURGERY Left 3/4/2021    Procedure: LEFT ARM radialcephalic FISTULA PLACEMENT;  Surgeon: Jm Huynh MD;  Location: [facility] OR;  Service: Vascular;  Laterality: Left;   • CARDIAC CATHETERIZATION     • CHOLECYSTECTOMY     • COLONOSCOPY     • EPIDURAL BLOCK     • MOHS SURGERY Left 07/20/2017    Cheek   • NEPHRECTOMY PARTIAL Right 1/3/2020    Procedure: RIGHT LAPAROSCOPIC PARTIAL  NEPHRECTOMY;  Surgeon: Ean Chavez MD;  Location: [facility] OR;  Service: Urology   • SKIN CANCER EXCISION  2014    Top of head x2   • TONSILLECTOMY           FAMILY HISTORY    Family History   Problem Relation Age of Onset   • Coronary artery disease Father         ARTERIOSCLEROSIS  NONPREMATURE   • Heart disease Father    • Hypertension Father    • Diabetes Father    • Gallbladder disease Father    • Heart attack Father    • Hypertension Mother    • Diabetes Mother    • Heart disease Mother    • Diabetes Sister    • Heart disease Sister    • Hypertension Sister    • Diabetes Brother    • Gallbladder disease Brother    • Heart disease Brother    • Hypertension Brother    • Malig Hyperthermia Neg Hx          SOCIAL HISTORY    Social History     Socioeconomic History   • Marital status:      Spouse name: Chary   • Number of children: 1   • Years of education: High school   Tobacco Use   • Smoking status: Passive Smoke Exposure - Never Smoker   • Smokeless tobacco: Never   • Tobacco comments:     caffeine use - None   Vaping Use   • Vaping Use: Never used   Substance and Sexual Activity   • Alcohol use: No   • Drug use: Never   • Sexual activity: Defer         ALLERGIES    Patient has no known allergies.      REVIEW OF SYSTEMS    Review of Systems   Constitutional: Negative for chills and fever.   Eyes: Negative for visual disturbance.   Respiratory: Negative for shortness of breath.    Cardiovascular: Negative for chest pain.   Neurological: Positive for headaches. Negative for dizziness, syncope, facial asymmetry, speech difficulty, weakness, light-headedness and numbness.       All systems reviewed and negative except those discussed in HPI.      PHYSICAL EXAM    ED Triage Vitals   Temp Heart Rate Resp BP SpO2   11/13/22 0932 11/13/22 0932 11/13/22 0932 11/13/22 0935 11/13/22 0932   97.5 °F (36.4 °C) (!) 49 16 158/66 98 %      Temp src Heart Rate Source Patient Position BP Location FiO2 (%)   11/13/22 0932 11/13/22 0932 11/13/22 0935 11/13/22 0935 --   Oral Monitor Standing Right arm        I have reviewed the triage vital signs and nursing notes.    Constitutional: Well appearing, non distressed  Head: Atraumatic, normocephalic  Neck: No midline tenderness, Full painless ROM  Eyes: No  scleral icterus, no scleral injection  ENT: Nares patent  CV: Regular rate, regular rhythm, distal pulses symmetric  Respiratory/Chest: No distress, CTAB, no chest wall tenderness  Abdomen: Abdomen soft, nontender  Back: No midline tenderness, Full ROM, No CVA tenderness  Extremities: Fistula to LUE with palpable thrill, No deformity, soft compartments, no edema  Skin: Warm, dry, no rash  Neuro: A&Ox4, moves all extremities, follows commands, no focal deficits  Psych: Normal mood        LAB RESULTS    Recent Results (from the past 24 hour(s))   ECG 12 Lead Bradycardia    Collection Time: 11/13/22  9:43 AM   Result Value Ref Range    QT Interval 482 ms   Comprehensive Metabolic Panel    Collection Time: 11/13/22 11:23 AM    Specimen: Blood   Result Value Ref Range    Glucose 107 (H) 65 - 99 mg/dL    BUN 45 (H) 8 - 23 mg/dL    Creatinine 6.67 (H) 0.76 - 1.27 mg/dL    Sodium 135 (L) 136 - 145 mmol/L    Potassium 4.4 3.5 - 5.2 mmol/L    Chloride 96 (L) 98 - 107 mmol/L    CO2 29.7 (H) 22.0 - 29.0 mmol/L    Calcium 9.4 8.6 - 10.5 mg/dL    Total Protein 7.0 6.0 - 8.5 g/dL    Albumin 4.20 3.50 - 5.20 g/dL    ALT (SGPT) 18 1 - 41 U/L    AST (SGOT) 16 1 - 40 U/L    Alkaline Phosphatase 192 (H) 39 - 117 U/L    Total Bilirubin 0.6 0.0 - 1.2 mg/dL    Globulin 2.8 gm/dL    A/G Ratio 1.5 g/dL    BUN/Creatinine Ratio 6.7 (L) 7.0 - 25.0    Anion Gap 9.3 5.0 - 15.0 mmol/L    eGFR 8.3 (L) >60.0 mL/min/1.73   CBC Auto Differential    Collection Time: 11/13/22 11:23 AM    Specimen: Blood   Result Value Ref Range    WBC 5.23 3.40 - 10.80 10*3/mm3    RBC 3.78 (L) 4.14 - 5.80 10*6/mm3    Hemoglobin 12.4 (L) 13.0 - 17.7 g/dL    Hematocrit 38.2 37.5 - 51.0 %    .1 (H) 79.0 - 97.0 fL    MCH 32.8 26.6 - 33.0 pg    MCHC 32.5 31.5 - 35.7 g/dL    RDW 13.4 12.3 - 15.4 %    RDW-SD 49.4 37.0 - 54.0 fl    MPV 10.1 6.0 - 12.0 fL    Platelets 178 140 - 450 10*3/mm3    Neutrophil % 56.2 42.7 - 76.0 %    Lymphocyte % 21.4 19.6 - 45.3 %    Monocyte  % 11.5 5.0 - 12.0 %    Eosinophil % 10.1 (H) 0.3 - 6.2 %    Basophil % 0.4 0.0 - 1.5 %    Immature Grans % 0.4 0.0 - 0.5 %    Neutrophils, Absolute 2.94 1.70 - 7.00 10*3/mm3    Lymphocytes, Absolute 1.12 0.70 - 3.10 10*3/mm3    Monocytes, Absolute 0.60 0.10 - 0.90 10*3/mm3    Eosinophils, Absolute 0.53 (H) 0.00 - 0.40 10*3/mm3    Basophils, Absolute 0.02 0.00 - 0.20 10*3/mm3    Immature Grans, Absolute 0.02 0.00 - 0.05 10*3/mm3    nRBC 0.0 0.0 - 0.2 /100 WBC       I ordered the above labs and independently reviewed the results.    RADIOLOGY RESULTS    CT Head Without Contrast    Result Date: 11/13/2022  CT SCAN OF THE BRAIN WITHOUT CONTRAST  HISTORY: Severe left-sided headache.  The CT scan was performed as an emergency procedure through the brain without contrast. There is mild diffuse atrophy with generalized enlargement of the cortical sulci and ventricles that is similar to the study of 05/17/2021. There is no evidence of acute intracranial hemorrhage or mass effect. The visualized sinuses and mastoid air cells are clear.      Radiation dose reduction techniques were utilized, including automated exposure control and exposure modulation based on body size.  This report was finalized on 11/13/2022 12:14 PM by Dr. Leif Omer M.D.        I ordered the above noted radiological studies and reviewed the images on the PACS system.       EKG    Interpreted by ED Physician. Please see their note for documentation.    MEDICATIONS GIVEN IN ER    Medications - No data to display      PROGRESS, CONSULTS, and MEDICAL DECISION MAKING    DIFFERENTIAL DIAGNOSIS  Differential diagnosis for headache includes but is not limited to:    Migraine, tension headache, cluster headache, meningitis, ICH, acute obstructive hydrocephalus, intracranial mass, CO poisoning, CVA, cerebral venous thrombosis, HTN emergency, giant cell arteritis, idiopathic intracranial hypertension, acute glaucoma, acute sinusitis, cavernous sinus thrombosis,  "carotid artery dissection, trigeminal neuralgia, post LP headache, dehydration        ED Course as of 11/15/22 1947   Sun Nov 13, 2022   1208 Old records reviewed.  Patient had an MRI of the brain done in April 2021 which showed \"punctate areas of susceptibility artifact are identified within the right corona radiata, and the corticomedullary interfaces of the right occipital and medial temporal lobes compatible with chronic microhemorrhages which are statistically likely due to underlying amyloid.  He was taken off of Eliquis at that time. [DC]   1214 CT Head Without Contrast  Negative acute [DC]   1215 Patient declined Compazine and Benadryl. [DC]   1237 Patient updated on labs and imaging results.  Recommended follow-up with his primary care provider possibly neurology if his symptoms persist.  Discussed ER return precautions.  Patient confirms he gets dialysis Monday Wednesday Friday and is scheduled for tomorrow. [DC]      ED Course User Index  [DC] Gabby Clark PA             DIAGNOSIS  Final diagnoses:   Left-sided headache       DISPOSITION  ED Disposition     ED Disposition   Discharge    Condition   Stable    Comment   --             FOLLOW UP  Natanael Marshall MD  [address]  [facility] [id number] [phone]    Schedule an appointment as soon as possible for a visit       Srikanth Quinn MD  [address]  [facility] [id number] [phone]      Neurology follow up      DISCHARGE RX     Medication List      No changes were made to your prescriptions during this visit.             Patient was placed in face mask in first look. Patient was wearing facemask when I entered the room and throughout our encounter. I wore full protective equipment throughout this patient encounter including a face mask, and gloves. Hand hygiene was performed before donning protective equipment and after removal when leaving the room.    Dictated utilizing Dragon dictation.      Note Disclaimer: At " [facility], we believe that sharing information builds trust and better relationships. You are receiving this note because you recently visited [facility]. It is possible you will see health information before a provider has talked with you about it. This kind of information can be easy to misunderstand. To help you fully understand what it means for your health, we urge you to discuss this note with your provider.           Gabby Clark PA  11/15/22 1947

## 2022-11-13 NOTE — ED NOTES
"Pt ambulatory to triage with c/o stabbing head pain on the left side on head x 3-4 weeks, worsening today. Pt denies injury. No neuro deficits noted at this time. Pt reports the left eye is \"watering\"    Pt reports history of low HR, pt HR currently at 49.     No blood thinners.    Pt given mask in triage, staff in PPE.    "

## 2022-11-13 NOTE — ED PROVIDER NOTES
MD ATTESTATION NOTE    The HALEY and I have discussed this patient's history, physical exam, and treatment plan.  I have reviewed the documentation and personally had a face to face interaction with the patient. I affirm the documentation and agree with the treatment and plan.  The attached note describes my personal findings.      I provided a substantive portion of the care of the patient.  I personally performed the physical exam in its entirety, and below are my findings.  For this patient encounter, the patient wore surgical mask, I wore full protective PPE including N95 and eye protection.      Brief HPI: Patient complains of intermittent sharp stabbing pain on the left side of his head for the past 4 weeks.  Pain lasts for only a few seconds.  Nothing makes it better or worse.  He states these episodes have been getting more frequent.  He also reports some watering of his left eye.  Denies recent head injury, vision changes, vision loss, fever, slurred speech, dizziness, syncope, chest pain, or numbness/tingling/weakness in his extremities.  Patient is on dialysis.  He is not on anticoagulants.  Patient says he has chronic bradycardia.  Denies dizziness or syncope.    PHYSICAL EXAM  ED Triage Vitals   Temp Heart Rate Resp BP SpO2   11/13/22 0932 11/13/22 0932 11/13/22 0932 11/13/22 0935 11/13/22 0932   97.5 °F (36.4 °C) (!) 49 16 158/66 98 %      Temp src Heart Rate Source Patient Position BP Location FiO2 (%)   11/13/22 0932 11/13/22 0932 11/13/22 0935 11/13/22 0935 --   Oral Monitor Standing Right arm          GENERAL: Awake, alert, oriented x3.  Well-developed, well-nourished elderly male.  Resting comfortably in no acute distress  HENT: NCAT, no temporal artery tenderness, no sinus tenderness  EYES: no scleral icterus  CV: regular rhythm, bradycardic  RESPIRATORY: normal effort, clear to auscultation bilateral ABDOMEN: soft, nontender  MUSCULOSKELETAL: Full range of motion in all extremities  NEURO: Speech  is clear and fluent.  No aphasia.  No facial droop.  Normal strength and light touch sensation in all extremities.  PSYCH:  calm, cooperative  SKIN: warm, dry    Vital signs and nursing notes reviewed.    EKG          EKG time: 9:43 AM  Rhythm/Rate: Atrial flutter, rate 42  P waves and RI: Irregular  QRS, axis: LAD, IVCD, anterior Q waves  ST and T waves: Normal    Interpreted Contemporaneously by me, independently viewed  EKG is not significantly changed compared to prior EKG done on 3/26/2020      Plan: Obtain labs and head CT.  Administer IV Compazine Benadryl.    Head CT is negative acute.  BUN and creatinine are elevated but potassium is normal.  Hemoglobin is stable.  Patient declined IV medications.  His symptoms are not suggestive of an intracranial hemorrhage.  Patient will be discharged.  He was advised to follow-up with his PCP.     Felix Yap MD  11/13/22 [id number]

## 2022-11-14 ENCOUNTER — TELEPHONE (OUTPATIENT)
Dept: NEUROLOGY | Facility: CLINIC | Age: 72
End: 2022-11-14

## 2022-11-14 NOTE — TELEPHONE ENCOUNTER
Provider: NO KNOWN PROVIDER  Caller: PATIENT  Relationship to Patient: SELF  Pharmacy: N/A  Phone Number: [phone]  Reason for Call:PATIENT WAS IN THE E.R. FOR SHOOTING PAINS IN THE LEFT SIDE OF PATIENT HEAD; AFFECTING LEFT EYE & HE IS INTERMITTENTLY EXPERIENCING BLURRED VISION.    WAS SEEN BY AGUSTÍN MOORE MD WHILE IN THE E.R.    PLEASE REVIEW & ADVISE WHO CAN SEE PATIENT.    THANK YOU.

## 2022-12-01 ENCOUNTER — LAB (OUTPATIENT)
Dept: LAB | Facility: HOSPITAL | Age: 72
End: 2022-12-01

## 2022-12-01 ENCOUNTER — OFFICE VISIT (OUTPATIENT)
Dept: NEUROLOGY | Facility: CLINIC | Age: 72
End: 2022-12-01

## 2022-12-01 VITALS
SYSTOLIC BLOOD PRESSURE: 150 MMHG | HEART RATE: 40 BPM | OXYGEN SATURATION: 100 % | HEIGHT: 73 IN | BODY MASS INDEX: 29.63 KG/M2 | DIASTOLIC BLOOD PRESSURE: 64 MMHG | WEIGHT: 223.6 LBS

## 2022-12-01 DIAGNOSIS — G44.85 STABBING HEADACHE: ICD-10-CM

## 2022-12-01 DIAGNOSIS — G44.85 STABBING HEADACHE: Primary | ICD-10-CM

## 2022-12-01 LAB
CRP SERPL-MCNC: <0.3 MG/DL (ref 0–0.5)
ERYTHROCYTE [SEDIMENTATION RATE] IN BLOOD: 8 MM/HR (ref 0–20)

## 2022-12-01 PROCEDURE — 36415 COLL VENOUS BLD VENIPUNCTURE: CPT

## 2022-12-01 PROCEDURE — 86140 C-REACTIVE PROTEIN: CPT

## 2022-12-01 PROCEDURE — 99205 OFFICE O/P NEW HI 60 MIN: CPT | Performed by: PSYCHIATRY & NEUROLOGY

## 2022-12-01 PROCEDURE — 85652 RBC SED RATE AUTOMATED: CPT | Performed by: PSYCHIATRY & NEUROLOGY

## 2022-12-01 RX ORDER — ASPIRIN 81 MG/1
81 TABLET ORAL DAILY
COMMUNITY

## 2022-12-01 NOTE — PROGRESS NOTES
Notes by MA:  Patient referred to our office for sharp, shooting pain on the left side of head that leaves tenderness in area after pain resolves.    Subjective:   72-year-old right-handed gentleman sent for evaluation of new onset headaches over the past 4 to 5 weeks.  They are always on the left.  He describes them as a sharp or shocklike sensation over the left hemicranium with associated left-sided blurred vision but denies any injection or redness of the eye or watering or rhinorrhea associated with these.  They last less than a minute and he has some soreness over the scalp following each attack.  The attacks occur daily and occur multiple times per day.  Although sometimes severe they are not severe enough for him to consider starting on medications.  He had an ER visit for these which included unremarkable laboratory work-up and a CT of the brain which I personally reviewed.  This study reveals only chronic changes.  No acute process.  Patient ID: Donald Johnson is a 72 y.o. male.    History of Present Illness  The following portions of the patient's history were reviewed and updated as appropriate: allergies, current medications, past family history, past medical history, past social history, past surgical history and problem list.    Review of Systems   Constitutional: Negative for activity change, appetite change and fatigue.   HENT: Negative for facial swelling, trouble swallowing and voice change.    Eyes: Negative for photophobia, pain and visual disturbance.   Respiratory: Negative for chest tightness, shortness of breath and wheezing.    Cardiovascular: Negative for chest pain, palpitations and leg swelling.   Gastrointestinal: Negative for abdominal pain, nausea and vomiting.   Endocrine: Negative for cold intolerance and heat intolerance.   Musculoskeletal: Negative for arthralgias, back pain, gait problem, joint swelling, myalgias, neck pain and neck stiffness.   Neurological: Negative for  dizziness, tremors, seizures, syncope, facial asymmetry, speech difficulty, weakness, light-headedness, numbness and headaches.   Hematological: Does not bruise/bleed easily.   Psychiatric/Behavioral: Negative for agitation, behavioral problems, confusion, decreased concentration, dysphoric mood, hallucinations, self-injury, sleep disturbance and suicidal ideas. The patient is not nervous/anxious and is not hyperactive.         Objective:    Neurologic Exam     Mental Status   Oriented to person, place, and time.   Speech: speech is normal   Level of consciousness: alert    Cranial Nerves   Cranial nerves II through XII intact.     Motor Exam   Muscle bulk: normal  Overall muscle tone: normal    Strength   Right neck flexion: 5/5  Left neck flexion: 5/5  Right neck extension: 5/5  Left neck extension: 5/5  Right deltoid: 5/5  Left deltoid: 5/5  Right biceps: 5/5  Left biceps: 5/5  Right triceps: 5/5  Left triceps: 5/5  Right wrist flexion: 5/5  Left wrist flexion: 5/5  Right wrist extension: 5/5  Left wrist extension: 5/5  Right interossei: 5/5  Left interossei: 5/5  Right abdominals: 5/5  Left abdominals: 5/5  Right iliopsoas: 5/5  Left iliopsoas: 5/5  Right quadriceps: 5/5  Left quadriceps: 5/5  Right hamstrin/5  Left hamstrin/5  Right glutei: 5/5  Left glutei: 5/5  Right anterior tibial: 5/5  Left anterior tibial: 5/5  Right posterior tibial: 5/5  Left posterior tibial: 5/5  Right peroneal: 5/5  Left peroneal: 5/5  Right gastroc: 5/5  Left gastroc: 5/5Normal for age and activity level.     Sensory Exam   Symmetric sensory function to primary modalities in both upper and lower extremities.     Gait, Coordination, and Reflexes     Gait  Gait: normal    Coordination   Romberg: negative    Tremor   Resting tremor: absent  Intention tremor: absent    Reflexes   Right brachioradialis: 2+  Left brachioradialis: 2+  Right biceps: 2+  Left biceps: 2+  Right triceps: 2+  Left triceps: 2+  Right patellar: 2+  Left  patellar: 2+  Right achilles: 1+  Left achilles: 1+  Right : 2+  Left : 2+      Physical Exam  Neck:      Vascular: No carotid bruit.   Cardiovascular:      Rate and Rhythm: Bradycardia present.   Pulmonary:      Effort: Pulmonary effort is normal.   Abdominal:      Palpations: Abdomen is soft.   Musculoskeletal:      Cervical back: Neck supple.      Right lower leg: No edema.      Left lower leg: No edema.   Neurological:      Mental Status: He is oriented to person, place, and time.      Cranial Nerves: Cranial nerves 2-12 are intact.      Coordination: Romberg Test normal.      Gait: Gait is intact.      Deep Tendon Reflexes:      Reflex Scores:       Tricep reflexes are 2+ on the right side and 2+ on the left side.       Bicep reflexes are 2+ on the right side and 2+ on the left side.       Brachioradialis reflexes are 2+ on the right side and 2+ on the left side.       Patellar reflexes are 2+ on the right side and 2+ on the left side.       Achilles reflexes are 1+ on the right side and 1+ on the left side.  Psychiatric:         Speech: Speech normal.       No temporal tenderness or ropey temporal arteries on palpation  Assessment/Plan:     Diagnoses and all orders for this visit:    1. Stabbing headache (Primary)  -     MRI Brain Without Contrast; Future  -     MRI Angiogram Head Without Contrast; Future  -     Sedimentation Rate  -     C-reactive Protein; Future     72-year-old gentleman with about 1 month of fairly frequent, short lasting, left hemicranial head pains.  Recent ER work-up was unremarkable including noncontrast CT of the brain.  Differential diagnosis includes idiopathic stabbing headache, temporal arteritis, paroxysmal hemicrania.  He will need a sed rate and a CRP and we will initiate an MRI and MRA of the brain without contrast to exclude any structural or vascular abnormalities (he does have a family history of cerebral aneurysm).  I will review the work-up as it evolves and take  any further diagnostic or therapeutic measures that may be necessary.  For now, he wishes no new medications.  We will make sure he has follow-up here in clinic.  Thank you so much for the opportunity to participate in the care of this pleasant gentleman.      60 minutes patient care time today.

## 2022-12-02 ENCOUNTER — TELEPHONE (OUTPATIENT)
Dept: NEUROLOGY | Facility: CLINIC | Age: 72
End: 2022-12-02

## 2022-12-02 NOTE — TELEPHONE ENCOUNTER
----- Message from Laron Castaneda MD sent at 12/2/2022  9:26 AM EST -----  Sed rate is only 8, this is normal.  This excludes the dangerous diagnosis of temporal arteritis.

## 2022-12-09 ENCOUNTER — PATIENT ROUNDING (BHMG ONLY) (OUTPATIENT)
Dept: NEUROLOGY | Facility: CLINIC | Age: 72
End: 2022-12-09

## 2022-12-12 ENCOUNTER — TELEPHONE (OUTPATIENT)
Dept: CARDIOLOGY | Facility: CLINIC | Age: 72
End: 2022-12-12

## 2022-12-19 DIAGNOSIS — R94.31 ABNORMAL EKG: Primary | ICD-10-CM

## 2022-12-23 ENCOUNTER — HOSPITAL ENCOUNTER (OUTPATIENT)
Dept: CARDIOLOGY | Facility: HOSPITAL | Age: 72
Discharge: HOME | End: 2022-12-23
Admitting: INTERNAL MEDICINE

## 2022-12-23 VITALS
WEIGHT: 223 LBS | OXYGEN SATURATION: 98 % | BODY MASS INDEX: 29.55 KG/M2 | SYSTOLIC BLOOD PRESSURE: 140 MMHG | DIASTOLIC BLOOD PRESSURE: 60 MMHG | HEART RATE: 68 BPM | HEIGHT: 73 IN

## 2022-12-23 DIAGNOSIS — R94.31 ABNORMAL EKG: ICD-10-CM

## 2022-12-23 PROCEDURE — 93306 TTE W/DOPPLER COMPLETE: CPT

## 2022-12-23 PROCEDURE — 25010000002 PERFLUTREN (DEFINITY) 8.476 MG IN SODIUM CHLORIDE (PF) 0.9 % 10 ML INJECTION: Performed by: INTERNAL MEDICINE

## 2022-12-23 PROCEDURE — 93306 TTE W/DOPPLER COMPLETE: CPT | Performed by: INTERNAL MEDICINE

## 2022-12-23 RX ADMIN — PERFLUTREN 1.5 ML: 6.52 INJECTION, SUSPENSION INTRAVENOUS at 12:55

## 2022-12-27 ENCOUNTER — HOSPITAL ENCOUNTER (OUTPATIENT)
Dept: MRI IMAGING | Facility: HOSPITAL | Age: 72
Discharge: HOME | End: 2022-12-27

## 2022-12-27 DIAGNOSIS — G44.85 STABBING HEADACHE: ICD-10-CM

## 2022-12-27 PROCEDURE — 70551 MRI BRAIN STEM W/O DYE: CPT

## 2022-12-27 PROCEDURE — 70544 MR ANGIOGRAPHY HEAD W/O DYE: CPT

## 2022-12-28 ENCOUNTER — TELEPHONE (OUTPATIENT)
Dept: NEUROLOGY | Facility: CLINIC | Age: 72
End: 2022-12-28

## 2022-12-28 NOTE — TELEPHONE ENCOUNTER
Patient aware of results. He asked if his headaches could be caused by his new blood pressure medication as they started when he started the medication. Instructed patient to check with PCP that prescribed the medication.

## 2022-12-28 NOTE — TELEPHONE ENCOUNTER
----- Message from Laron Castaneda MD sent at 12/28/2022  9:52 AM EST -----  Brain MRI looks okay.  No changes.

## 2023-01-10 LAB
AORTIC ARCH: 2.2 CM
ASCENDING AORTA: 3.8 CM
BH CV ECHO LEFT VENTRICLE GLOBAL LONGITUDINAL STRAIN: -20.9 %
BH CV ECHO MEAS - ACS: 2.48 CM
BH CV ECHO MEAS - AO MAX PG: 7.8 MMHG
BH CV ECHO MEAS - AO MEAN PG: 4.3 MMHG
BH CV ECHO MEAS - AO ROOT DIAM: 3.6 CM
BH CV ECHO MEAS - AO V2 MAX: 139.8 CM/SEC
BH CV ECHO MEAS - AO V2 VTI: 27.1 CM
BH CV ECHO MEAS - AVA(I,D): 2.4 CM2
BH CV ECHO MEAS - EDV(CUBED): 205.9 ML
BH CV ECHO MEAS - EDV(MOD-SP2): 331 ML
BH CV ECHO MEAS - EDV(MOD-SP4): 305 ML
BH CV ECHO MEAS - EF(MOD-BP): 55.8 %
BH CV ECHO MEAS - EF(MOD-SP2): 54.7 %
BH CV ECHO MEAS - EF(MOD-SP4): 53.4 %
BH CV ECHO MEAS - ESV(CUBED): 51 ML
BH CV ECHO MEAS - ESV(MOD-SP2): 150 ML
BH CV ECHO MEAS - ESV(MOD-SP4): 142 ML
BH CV ECHO MEAS - FS: 37.2 %
BH CV ECHO MEAS - IVS/LVPW: 0.95 CM
BH CV ECHO MEAS - IVSD: 1.3 CM
BH CV ECHO MEAS - LAT PEAK E' VEL: 18.8 CM/SEC
BH CV ECHO MEAS - LV DIASTOLIC VOL/BSA (35-75): 135.3 CM2
BH CV ECHO MEAS - LV MASS(C)D: 355.1 GRAMS
BH CV ECHO MEAS - LV MAX PG: 2.6 MMHG
BH CV ECHO MEAS - LV MEAN PG: 1.42 MMHG
BH CV ECHO MEAS - LV SYSTOLIC VOL/BSA (12-30): 63 CM2
BH CV ECHO MEAS - LV V1 MAX: 81 CM/SEC
BH CV ECHO MEAS - LV V1 VTI: 17.3 CM
BH CV ECHO MEAS - LVIDD: 5.9 CM
BH CV ECHO MEAS - LVIDS: 3.7 CM
BH CV ECHO MEAS - LVOT AREA: 3.8 CM2
BH CV ECHO MEAS - LVOT DIAM: 2.19 CM
BH CV ECHO MEAS - LVPWD: 1.37 CM
BH CV ECHO MEAS - MED PEAK E' VEL: 11.7 CM/SEC
BH CV ECHO MEAS - MR MAX PG: 78.3 MMHG
BH CV ECHO MEAS - MR MAX VEL: 442.5 CM/SEC
BH CV ECHO MEAS - MV A DUR: 0.1 SEC
BH CV ECHO MEAS - MV A MAX VEL: 52 CM/SEC
BH CV ECHO MEAS - MV DEC SLOPE: 559.7 CM/SEC2
BH CV ECHO MEAS - MV DEC TIME: 0.17 MSEC
BH CV ECHO MEAS - MV E MAX VEL: 114 CM/SEC
BH CV ECHO MEAS - MV E/A: 2.19
BH CV ECHO MEAS - MV MAX PG: 8.1 MMHG
BH CV ECHO MEAS - MV MEAN PG: 3.1 MMHG
BH CV ECHO MEAS - MV P1/2T: 70.3 MSEC
BH CV ECHO MEAS - MV V2 VTI: 38.2 CM
BH CV ECHO MEAS - MVA(P1/2T): 3.1 CM2
BH CV ECHO MEAS - MVA(VTI): 1.7 CM2
BH CV ECHO MEAS - PA ACC TIME: 0.12 SEC
BH CV ECHO MEAS - PA PR(ACCEL): 25.5 MMHG
BH CV ECHO MEAS - PA V2 MAX: 88.2 CM/SEC
BH CV ECHO MEAS - PULM A REVS DUR: 0.15 SEC
BH CV ECHO MEAS - PULM A REVS VEL: 38.8 CM/SEC
BH CV ECHO MEAS - PULM DIAS VEL: 68.8 CM/SEC
BH CV ECHO MEAS - PULM S/D: 0.64
BH CV ECHO MEAS - PULM SYS VEL: 44.3 CM/SEC
BH CV ECHO MEAS - QP/QS: 0.82
BH CV ECHO MEAS - RAP SYSTOLE: 15 MMHG
BH CV ECHO MEAS - RV MAX PG: 2.49 MMHG
BH CV ECHO MEAS - RV V1 MAX: 78.8 CM/SEC
BH CV ECHO MEAS - RV V1 VTI: 14 CM
BH CV ECHO MEAS - RVOT DIAM: 2.2 CM
BH CV ECHO MEAS - RVSP: 41.3 MMHG
BH CV ECHO MEAS - SI(MOD-SP2): 80.3 ML/M2
BH CV ECHO MEAS - SI(MOD-SP4): 72.3 ML/M2
BH CV ECHO MEAS - SUP REN AO DIAM: 2.9 CM
BH CV ECHO MEAS - SV(LVOT): 65.1 ML
BH CV ECHO MEAS - SV(MOD-SP2): 181 ML
BH CV ECHO MEAS - SV(MOD-SP4): 163 ML
BH CV ECHO MEAS - SV(RVOT): 53.4 ML
BH CV ECHO MEAS - TAPSE (>1.6): 2.03 CM
BH CV ECHO MEAS - TR MAX PG: 26.3 MMHG
BH CV ECHO MEAS - TR MAX VEL: 256.5 CM/SEC
BH CV ECHO MEASUREMENTS AVERAGE E/E' RATIO: 7.48
BH CV XLRA - RV BASE: 3.1 CM
BH CV XLRA - RV LENGTH: 9.7 CM
BH CV XLRA - RV MID: 3.1 CM
BH CV XLRA - TDI S': 17.6 CM/SEC
LEFT ATRIUM VOLUME INDEX: 68.4 ML/M2
MAXIMAL PREDICTED HEART RATE: 150 BPM
SINUS: 4 CM
STJ: 3 CM
STRESS TARGET HR: 128 BPM

## 2023-01-11 ENCOUNTER — TELEPHONE (OUTPATIENT)
Dept: CARDIOLOGY | Facility: CLINIC | Age: 73
End: 2023-01-11
Payer: MEDICARE

## 2023-01-11 NOTE — TELEPHONE ENCOUNTER
----- Message from Ulices Puga MD sent at 1/11/2023  8:29 AM EST -----  Milo dave -can you do me a favor?   Can you send most recent echo report to Donald's transplant coordinator at [facility].  I have forwarded you the phone number.  You can tell them he does not have evidence for cardiac amyloidosis and echo shows an normal left ventricular global strain.  Let me know.  Thanks  ----- Message -----  From: Ulices Puga MD  Sent: 12/19/2022  12:57 PM EST  To: Ulices Puga MD    Call transplant at the following number with echo results -pending   [phone]

## 2023-01-11 NOTE — TELEPHONE ENCOUNTER
Called and spoke with [place] Faith.  Reviewed Dr. Puga's message with [place] and he verbalized understanding.  [place] requested copy of echo result be faxed to:    Attn [place]  [phone]    Copy of echo result performed on 12/23/22 printed and faxed as requested (success).    Please let me know if there is anything else you would like me to do for this patient.    Thank you,  Brenda Malin RN  Triage Nurse [facility] [place]

## 2023-03-02 ENCOUNTER — OFFICE VISIT (OUTPATIENT)
Dept: NEUROLOGY | Facility: CLINIC | Age: 73
End: 2023-03-02
Payer: MEDICARE

## 2023-03-02 VITALS
BODY MASS INDEX: 29.29 KG/M2 | SYSTOLIC BLOOD PRESSURE: 148 MMHG | HEART RATE: 67 BPM | DIASTOLIC BLOOD PRESSURE: 60 MMHG | HEIGHT: 73 IN | OXYGEN SATURATION: 100 % | WEIGHT: 221 LBS

## 2023-03-02 DIAGNOSIS — E85.4 CEREBRAL AMYLOID ANGIOPATHY: ICD-10-CM

## 2023-03-02 DIAGNOSIS — I68.0 CEREBRAL AMYLOID ANGIOPATHY: ICD-10-CM

## 2023-03-02 DIAGNOSIS — G44.85 IDIOPATHIC STABBING HEADACHE: Primary | ICD-10-CM

## 2023-03-02 PROCEDURE — 99214 OFFICE O/P EST MOD 30 MIN: CPT | Performed by: PSYCHIATRY & NEUROLOGY

## 2023-03-02 RX ORDER — HYDRALAZINE HYDROCHLORIDE 25 MG/1
TABLET, FILM COATED ORAL
COMMUNITY
Start: 2023-03-01

## 2023-03-02 NOTE — PROGRESS NOTES
Notes by MA:  Patient presents today for headache follow up. Patient reports headaches have improved since last ov.       Subjective:     Patient ID: Donald Johnson is a 72 y.o. male.    History of Present Illness  The following portions of the patient's history were reviewed and updated as appropriate: allergies, current medications, past family history, past medical history, past social history, past surgical history and problem list.    Review of Systems   Musculoskeletal: Negative for gait problem.   Neurological: Negative for dizziness, tremors, seizures, syncope, facial asymmetry, speech difficulty, weakness, light-headedness, numbness and headaches.   Hematological: Does not bruise/bleed easily.   Psychiatric/Behavioral: Negative for agitation, behavioral problems, confusion, decreased concentration, dysphoric mood, hallucinations, self-injury, sleep disturbance and suicidal ideas. The patient is not nervous/anxious and is not hyperactive.         Objective:    Neurologic Exam     Mental Status   Oriented to person, place, and time.   Speech: speech is normal   Level of consciousness: alert     Cranial Nerves   Cranial nerves II through XII intact.      Motor Exam   Muscle bulk: normal  Overall muscle tone: normal     Strength   Right neck flexion: 5/5  Left neck flexion: 5/5  Right neck extension: 5/5  Left neck extension: 5/5  Right deltoid: 5/5  Left deltoid: 5/5  Right biceps: 5/5  Left biceps: 5/5  Right triceps: 5/5  Left triceps: 5/5  Right wrist flexion: 5/5  Left wrist flexion: 5/5  Right wrist extension: 5/5  Left wrist extension: 5/5  Right interossei: 5/5  Left interossei: 5/5  Right abdominals: 5/5  Left abdominals: 5/5  Right iliopsoas: 5/5  Left iliopsoas: 5/5  Right quadriceps: 5/5  Left quadriceps: 5/5  Right hamstrin/5  Left hamstrin/5  Right glutei: 5/5  Left glutei: 5/5  Right anterior tibial: 5/5  Left anterior tibial: 5/5  Right posterior tibial: 5/5  Left posterior tibial:  5/5  Right peroneal: 5/5  Left peroneal: 5/5  Right gastroc: 5/5  Left gastroc: 5/5Normal for age and activity level.      Sensory Exam   Symmetric sensory function to primary modalities in both upper and lower extremities.      Gait, Coordination, and Reflexes      Gait  Gait: normal     Coordination   Romberg: negative     Tremor   Resting tremor: absent  Intention tremor: absent     Reflexes   Right brachioradialis: 2+  Left brachioradialis: 2+  Right biceps: 2+  Left biceps: 2+  Right triceps: 2+  Left triceps: 2+  Right patellar: 2+  Left patellar: 2+  Right achilles: 1+  Left achilles: 1+  Right : 2+  Left : 2+        Physical Exam  Neck:      Vascular: No carotid bruit.   Cardiovascular:      Rate and Rhythm: Bradycardia present.   Pulmonary:      Effort: Pulmonary effort is normal.   Abdominal:      Palpations: Abdomen is soft.   Musculoskeletal:      Cervical back: Neck supple.      Right lower leg: No edema.      Left lower leg: No edema.   Neurological:      Mental Status: He is oriented to person, place, and time.      Cranial Nerves: Cranial nerves 2-12 are intact.      Coordination: Romberg Test normal.      Gait: Gait is intact.      Deep Tendon Reflexes:      Reflex Scores:       Tricep reflexes are 2+ on the right side and 2+ on the left side.       Bicep reflexes are 2+ on the right side and 2+ on the left side.       Brachioradialis reflexes are 2+ on the right side and 2+ on the left side.       Patellar reflexes are 2+ on the right side and 2+ on the left side.       Achilles reflexes are 1+ on the right side and 1+ on the left side.  Psychiatric:         Speech: Speech normal.         No temporal tenderness or ropey temporal arteries on palpation  Physical Exam    Assessment/Plan:     Diagnoses and all orders for this visit:    1. Idiopathic stabbing headache (Primary)    2. Cerebral amyloid angiopathy (HCC)      His headaches have essentially subsided.  Our work-up for vascular,  structural, and vasculitic sources was negative and I reassured him from that standpoint.  I reviewed his MRI, MRA, CRP, and sed rate with him and reassured him from that standpoint.  No further intervention is required at this point.    I reviewed his previous MRIs with him back to 2021.  He has questions about being on blood thinners for upcoming procedures.  After reviewing his prior studies, he does MRI findings suggestive of cerebral amyloid angiopathy and therefore anticoagulation would be contraindicated.    No other changes for now.  Would be happy to see him back at any time.

## 2023-04-07 NOTE — NURSING NOTE
After t/o from Dr. Hernandez, procrit given per protocol.  Pt bp elevated and he states he has been taking his medication.  He agreed to monitor regularly and notify PCP if it continues to stay elevated.  Message also sent to Dr. Hernandez to advise of berhane new orders.    1.8

## 2023-04-25 ENCOUNTER — OFFICE VISIT (OUTPATIENT)
Dept: CARDIOLOGY | Facility: CLINIC | Age: 73
End: 2023-04-25
Payer: MEDICARE

## 2023-04-25 VITALS
HEIGHT: 73 IN | WEIGHT: 217 LBS | HEART RATE: 51 BPM | SYSTOLIC BLOOD PRESSURE: 138 MMHG | DIASTOLIC BLOOD PRESSURE: 72 MMHG | BODY MASS INDEX: 28.76 KG/M2

## 2023-04-25 DIAGNOSIS — I48.92 ATRIAL FLUTTER WITH CONTROLLED RESPONSE: Primary | ICD-10-CM

## 2023-04-25 DIAGNOSIS — E78.2 MIXED HYPERLIPIDEMIA: ICD-10-CM

## 2023-04-25 PROCEDURE — 3075F SYST BP GE 130 - 139MM HG: CPT | Performed by: INTERNAL MEDICINE

## 2023-04-25 PROCEDURE — 99214 OFFICE O/P EST MOD 30 MIN: CPT | Performed by: INTERNAL MEDICINE

## 2023-04-25 PROCEDURE — 3078F DIAST BP <80 MM HG: CPT | Performed by: INTERNAL MEDICINE

## 2023-04-25 PROCEDURE — 93000 ELECTROCARDIOGRAM COMPLETE: CPT | Performed by: INTERNAL MEDICINE

## 2023-04-25 NOTE — PROGRESS NOTES
PATIENTINFORMATION    Date of Office Visit: 2023  Encounter Provider: Ulices Puga MD  Place of Service: [facility] CARDIOLOGY  Patient Name: Donald Johnson  : 1950    Subjective:     Encounter Date:2023      Patient ID: Donald Johnson is a 72 y.o. male.    No chief complaint on file.    HPI  Mr. Johnson is a pleasant 72 years old gentleman who came to cardiology clinic for follow-up visit.  [facility] unless he gets atrial flutter ablation that could not be done because anticoagulation is contraindicated for him.  He has remained relatively stable and fairly active and denies any significant change in his breathing, any chest pain, orthopnea, PND.  He gets rare palpitations when heart rate which is in the 80s.  He has noted some extremity swelling since he was started on amlodipine for hypertension.  Blood pressure sometimes run in the 160s systolic in the morning before he takes his meds otherwise controlled for the most part.    He denies any recent ER visits or hospitalization.  Compliant with medications.  He plans to walk regularly once the weather gets better.      ROS  All systems reviewed and negative except as noted in HPI.    Past Medical History:   Diagnosis Date   • Anemia    • Anticoagulated by anticoagulation treatment     eliquis   • Arthritis    • At risk for obstructive sleep apnea     stop bang #5   • Atrial flutter    • Cancer 2017    Left cheek, nonmelanoma   • Chronic kidney disease    • Coarctation of aorta     very mild, likely not hemodynamically significant   • DDD (degenerative disc disease), lumbosacral    • Diabetes mellitus     Type 2   • H/O Chronic gout    • H/O Lung mass    • History of cardiac cath     2013 with luminal irregularities, normal EF, normal LVEDP   • Hyperlipidemia    • Hypertension    • Monoclonal gammopathy    • Obesity    • PAD (peripheral artery  disease)    • PONV (postoperative nausea and vomiting)    • Right kidney mass    • Splenic artery aneurysm        Past Surgical History:   Procedure Laterality Date   • ACHILLES TENDON SURGERY Right 1991   • ARTERIOVENOUS FISTULA/SHUNT SURGERY Left 3/4/2021    Procedure: LEFT ARM radialcephalic FISTULA PLACEMENT;  Surgeon: Jm Huynh MD;  Location: [facility] OR;  Service: Vascular;  Laterality: Left;   • CARDIAC CATHETERIZATION     • CHOLECYSTECTOMY     • COLONOSCOPY     • EPIDURAL BLOCK     • MOHS SURGERY Left 07/20/2017    Cheek   • NEPHRECTOMY PARTIAL Right 1/3/2020    Procedure: RIGHT LAPAROSCOPIC PARTIAL  NEPHRECTOMY;  Surgeon: Ean Chavez MD;  Location: [facility] OR;  Service: Urology   • SKIN CANCER EXCISION  2014    Top of head x2   • TONSILLECTOMY         Social History     Socioeconomic History   • Marital status:      Spouse name: Chary   • Number of children: 1   • Years of education: High school   Tobacco Use   • Smoking status: Never     Passive exposure: Yes   • Smokeless tobacco: Never   • Tobacco comments:     caffeine use - None   Vaping Use   • Vaping Use: Never used   Substance and Sexual Activity   • Alcohol use: No   • Drug use: Never   • Sexual activity: Defer       Family History   Problem Relation Age of Onset   • Hypertension Mother    • Diabetes Mother    • Heart disease Mother    • Coronary artery disease Father         ARTERIOSCLEROSIS NONPREMATURE   • Heart disease Father    • Hypertension Father    • Diabetes Father    • Gallbladder disease Father    • Heart attack Father    • Diabetes Sister    • Heart disease Sister    • Hypertension Sister    • Diabetes Brother    • Gallbladder disease Brother    • Heart disease Brother    • Hypertension Brother    • Aneurysm Brother    • Malig Hyperthermia Neg Hx            ECG 12 Lead    Date/Time: 4/25/2023 8:29 AM  Performed by: Ulices Puga MD  Authorized by: Ulices Puga MD   Comparison:  "compared with previous ECG from 11/13/2022  Similar to previous ECG  Rhythm: atrial flutter  Rate: normal  Conduction: conduction normal  ST Segments: ST segments normal  T Waves: T waves normal  QRS axis: normal  Other: no other findings    Clinical impression: abnormal EKG               Objective:     /72   Pulse 51   Ht 185.4 cm (73\")   Wt 98.4 kg (217 lb)   BMI 28.63 kg/m²  Body mass index is 28.63 kg/m².     Constitutional:       General: Not in acute distress.     Appearance: Well-developed. Not diaphoretic.   Eyes:      Pupils: Pupils are equal, round, and reactive to light.   HENT:      Head: Normocephalic and atraumatic.   Neck:      Thyroid: No thyromegaly.   Pulmonary:      Effort: Pulmonary effort is normal. No respiratory distress.      Breath sounds: Normal breath sounds. No wheezing. No rales.   Chest:      Chest wall: Not tender to palpatation.   Cardiovascular:      Normal rate. Regular rhythm.      No gallop.   Pulses:     Intact distal pulses.   Edema:     Peripheral edema absent.   Abdominal:      General: Bowel sounds are normal. There is no distension.      Palpations: Abdomen is soft.      Tenderness: There is no guarding.   Musculoskeletal: Normal range of motion.         General: No deformity.      Cervical back: Normal range of motion and neck supple. Skin:     General: Skin is warm and dry.      Findings: No rash.   Neurological:      Mental Status: Alert and oriented to person, place, and time.      Cranial Nerves: No cranial nerve deficit.      Deep Tendon Reflexes: Reflexes are normal and symmetric.   Psychiatric:         Judgment: Judgment normal.         Review Of Data: I have reviewed pertinent recent labs, images and documents and pertinent findings included in HPI or assessment below.    Lipid Panel        9/6/2022    14:29   Lipid Panel   Total Cholesterol 102        Triglycerides 75        HDL Cholesterol 43        LDL Cholesterol  44            This result is from an " external source.         Assessment/Plan:         1.  Persistent atrial flutter that is rate controlled without AV kiley blockers  -History of bradycardia with beta-blocker and rate controlled off AV kiley blocker  -Underlying LBBB.   -Off anticoagulation because of MRI showing amyloid angiopathy and increased risk of bleeding-he is neurologist said it is contraindicated to anticoagulate him  -Echocardiogram in December 2022: Moderate LVH but preserved function, severely dilated left atrial cavity, mild pulmonary hypertension, mild MR/TR  -Currently on aspirin 81 mg p.o. daily.     2.  Hypertension-fairly controlled on hydralazine and amlodipine.    3.  End-stage renal disease on hemodialysis using left forearm AV fistula  -Transplant declined by [facility] unless he gets atrial flutter ablation which is now possible  -Seeing [facility] and [facility].     4.  Peripheral arterial disease-continue high intensity statin  No significant symptoms     5.  Hypercholesterolemia-on high intensity statin-lipid panel at goal    6.  Suspected cerebral amyloid angiopathy-increased risk of bleeding with anticoagulation.  Optimize blood pressure control.        No medication changes made today.  Follow-up in cardiology clinic in 6 months or sooner with any concerning symptoms.    Diagnosis and plan of care discussed with patient and verbalized understanding.            Your medication list          Accurate as of April 25, 2023  8:32 AM. If you have any questions, ask your nurse or doctor.            CONTINUE taking these medications      Instructions Last Dose Given Next Dose Due   allopurinol 300 MG tablet  Commonly known as: ZYLOPRIM      Take 150 mg by mouth Daily.       amLODIPine 2.5 MG tablet  Commonly known as: NORVASC      Take 2 tablets by mouth Daily.       aspirin 81 MG EC tablet      Take 1 tablet by mouth Daily.       atorvastatin 40 MG tablet  Commonly known as:  LIPITOR      Take 1 tablet by mouth Every Morning.       doxycycline 100 MG capsule  Commonly known as: MONODOX      Take 1 capsule by mouth 2 (Two) Times a Day.       freestyle lancets      INJECT 1 INTO THE SKIN EVERY DAY       FREESTYLE LITE test strip  Generic drug: glucose blood      USE ONE STRIP AS DIRECTED ONCE DAILY       glimepiride 1 MG tablet  Commonly known as: AMARYL      Take 1 tablet by mouth Every Morning Before Breakfast.       hydrALAZINE 25 MG tablet  Commonly known as: APRESOLINE           promethazine-dextromethorphan 6.25-15 MG/5ML syrup  Commonly known as: PROMETHAZINE-DM      Take 5 mL by mouth 4 (Four) Times a Day As Needed for Cough.       ROXI-YULISSA PO      Take 1 tablet by mouth Daily.       sennosides-docusate 8.6-50 MG per tablet  Commonly known as: PERICOLACE      Take 2 tablets by mouth Every Night.       tamsulosin 0.4 MG capsule 24 hr capsule  Commonly known as: FLOMAX      Take 2 capsules by mouth Every Night.       Vitamin D3 10 MCG (400 UNIT) chewable tablet      Chew.                  Ulices Puga MD  04/25/23  08:32 EDT

## 2023-07-31 ENCOUNTER — TELEPHONE (OUTPATIENT)
Dept: CARDIOLOGY | Facility: CLINIC | Age: 73
End: 2023-07-31
Payer: MEDICARE

## 2023-07-31 RX ORDER — NIFEDIPINE 30 MG/1
30 TABLET, EXTENDED RELEASE ORAL DAILY
Qty: 30 TABLET | Refills: 1 | Status: SHIPPED | OUTPATIENT
Start: 2023-07-31

## 2023-08-04 ENCOUNTER — OFFICE VISIT (OUTPATIENT)
Dept: CARDIOLOGY | Facility: CLINIC | Age: 73
End: 2023-08-04
Payer: MEDICARE

## 2023-08-04 VITALS
BODY MASS INDEX: 27.91 KG/M2 | DIASTOLIC BLOOD PRESSURE: 68 MMHG | HEIGHT: 73 IN | OXYGEN SATURATION: 99 % | WEIGHT: 210.6 LBS | HEART RATE: 51 BPM | SYSTOLIC BLOOD PRESSURE: 130 MMHG

## 2023-08-04 DIAGNOSIS — I48.92 ATRIAL FLUTTER WITH CONTROLLED RESPONSE: Primary | ICD-10-CM

## 2023-08-04 DIAGNOSIS — R00.1 BRADYCARDIA: ICD-10-CM

## 2023-08-04 DIAGNOSIS — I10 ESSENTIAL HYPERTENSION: ICD-10-CM

## 2023-08-04 DIAGNOSIS — E78.2 MIXED HYPERLIPIDEMIA: ICD-10-CM

## 2023-08-04 DIAGNOSIS — N18.6 ESRD (END STAGE RENAL DISEASE): ICD-10-CM

## 2023-08-04 PROBLEM — R11.2 NAUSEA AND VOMITING: Status: RESOLVED | Noted: 2021-05-17 | Resolved: 2023-08-04

## 2023-09-11 ENCOUNTER — TELEPHONE (OUTPATIENT)
Dept: CARDIOLOGY | Facility: CLINIC | Age: 73
End: 2023-09-11
Payer: MEDICARE

## 2023-09-11 DIAGNOSIS — R00.1 BRADYCARDIA: ICD-10-CM

## 2023-09-11 DIAGNOSIS — R94.31 ABNORMAL EKG: ICD-10-CM

## 2023-09-11 DIAGNOSIS — I48.92 ATRIAL FLUTTER WITH CONTROLLED RESPONSE: Primary | ICD-10-CM

## 2023-09-11 NOTE — TELEPHONE ENCOUNTER
Abnormal monitor--aflutter, some short pauses    Selena has been seeing him along with Dr. Puga--discussed with Dr. Awad---he wants to see him    Can you call him with appt in the next few weeks please

## 2023-09-20 ENCOUNTER — OFFICE VISIT (OUTPATIENT)
Dept: CARDIOLOGY | Facility: CLINIC | Age: 73
End: 2023-09-20
Payer: MEDICARE

## 2023-09-20 VITALS
HEIGHT: 73 IN | DIASTOLIC BLOOD PRESSURE: 68 MMHG | SYSTOLIC BLOOD PRESSURE: 146 MMHG | HEART RATE: 56 BPM | BODY MASS INDEX: 27.83 KG/M2 | WEIGHT: 210 LBS

## 2023-09-20 DIAGNOSIS — R00.1 BRADYCARDIA: ICD-10-CM

## 2023-09-20 DIAGNOSIS — I48.92 ATRIAL FLUTTER WITH CONTROLLED RESPONSE: Primary | ICD-10-CM

## 2023-09-20 RX ORDER — SEVELAMER CARBONATE 800 MG/1
TABLET, FILM COATED ORAL
COMMUNITY
Start: 2023-08-02

## 2023-09-20 NOTE — Clinical Note
See note --   No symptoms from bahman - No symptoms from flutter   I think he could stop the asa   I would not rec pacer nor ablation or cardioversion

## 2023-09-20 NOTE — PROGRESS NOTES
"Date of Office Visit: 2023  Encounter Provider: Davion Awad MD  Place of Service: [facility] CARDIOLOGY  Patient Name: Donald Johnson  : 1950    Subjective:     Encounter Date:2023      Patient ID: Donald Johnson is a 73 y.o. male who has a cc of  referred by Jasmine -- he also has cerebral anyloid angiopathy and cannot take apixaban.     He has atrial flutter but is referred b/c of low heart rate.     He feels \"decent\"    He walks 2 miles on his days off dialysis.     He has no syncope.     No anginal chest pain,   No sig patel,   No soa,   No fainting,  No orthostasis.   No edema.   Exercise tolerance: very good.     There have been no hospital admission since the last visit.     There have been no bleeding events.       Past Medical History:   Diagnosis Date    Anemia     Anticoagulated by anticoagulation treatment     eliquis    Arthritis     At risk for obstructive sleep apnea     stop bang #5    Atrial flutter     Cancer 2017    Left cheek, nonmelanoma    Chronic kidney disease     Coarctation of aorta     very mild, likely not hemodynamically significant    DDD (degenerative disc disease), lumbosacral     Diabetes mellitus     Type 2    H/O Chronic gout     H/O Lung mass 2016    History of cardiac cath     2013 with luminal irregularities, normal EF, normal LVEDP    Hyperlipidemia     Hypertension     Monoclonal gammopathy 2018    Obesity     PAD (peripheral artery disease)     PONV (postoperative nausea and vomiting)     Right kidney mass     Splenic artery aneurysm        Social History     Socioeconomic History    Marital status:      Spouse name: Chary    Number of children: 1    Years of education: High school   Tobacco Use    Smoking status: Never     Passive exposure: Yes    Smokeless tobacco: Never    Tobacco comments:     caffeine use - None   Vaping Use    Vaping Use: Never used   Substance and Sexual Activity    Alcohol use: No    Drug use: " "Never    Sexual activity: Defer       Family History   Problem Relation Age of Onset    Hypertension Mother     Diabetes Mother     Heart disease Mother     Coronary artery disease Father         ARTERIOSCLEROSIS NONPREMATURE    Heart disease Father     Hypertension Father     Diabetes Father     Gallbladder disease Father     Heart attack Father     Diabetes Sister     Heart disease Sister     Hypertension Sister     Diabetes Brother     Gallbladder disease Brother     Heart disease Brother     Hypertension Brother     Aneurysm Brother     Malig Hyperthermia Neg Hx        Review of Systems   Constitutional: Negative for fever and night sweats.   HENT:  Negative for ear pain and stridor.    Eyes:  Negative for discharge and visual halos.   Cardiovascular:  Negative for cyanosis.   Respiratory:  Negative for hemoptysis and sputum production.    Hematologic/Lymphatic: Negative for adenopathy.   Skin:  Negative for nail changes and unusual hair distribution.   Musculoskeletal:  Negative for gout and joint swelling.   Gastrointestinal:  Negative for bowel incontinence and flatus.   Genitourinary:  Negative for dysuria and flank pain.   Neurological:  Negative for seizures and tremors.   Psychiatric/Behavioral:  Negative for altered mental status. The patient is not nervous/anxious.           Objective:     Vitals:    09/20/23 1232   BP: 146/68   Pulse: 56   Weight: 95.3 kg (210 lb)   Height: 185.4 cm (73\")         Eyes:      General:         Right eye: No discharge.         Left eye: No discharge.   HENT:      Head: Normocephalic and atraumatic.   Neck:      Thyroid: No thyromegaly.      Vascular: No JVD.   Pulmonary:      Effort: Pulmonary effort is normal.      Breath sounds: Normal breath sounds. No rales.   Cardiovascular:      Normal rate. Irregularly irregular rhythm.      No gallop.    Edema:     Peripheral edema absent.   Abdominal:      General: Bowel sounds are normal.      Palpations: Abdomen is soft.      " Tenderness: There is no abdominal tenderness.   Musculoskeletal: Normal range of motion.         General: No deformity. Skin:     General: Skin is warm and dry.      Findings: No erythema.   Neurological:      Mental Status: Alert and oriented to person, place, and time.      Motor: Normal muscle tone.   Psychiatric:         Behavior: Behavior normal.         Thought Content: Thought content normal.         ECG 12 Lead    Date/Time: 9/20/2023 1:31 PM  Performed by: Davion Awad MD  Authorized by: Davion Awad MD   Comparison: compared with previous ECG   Similar to previous ECG  Rhythm: sinus rhythm and atrial flutter  Rate: normal  Conduction: conduction normal  ST Segments: ST segments normal  T Waves: T waves normal  QRS axis: normal    Clinical impression: normal ECG        Lab Review:       Assessment:          Diagnosis Plan   1. Atrial flutter with controlled response        2. Bradycardia               Plan:     I tried and I can obtain no symptoms of bradycardia. That, plus, the higher risk of pacing in ESRD, moves to NOT rec a pacemaker. At this point.     I would also not ablate the atrial flutter--because it too does not cause symptoms.

## 2023-09-28 ENCOUNTER — PATIENT ROUNDING (BHMG ONLY) (OUTPATIENT)
Dept: CARDIOLOGY | Facility: CLINIC | Age: 73
End: 2023-09-28
Payer: MEDICARE

## 2023-09-28 NOTE — PROGRESS NOTES
A My Chart message has been sent to the patient for [place] [facility]

## 2023-10-27 ENCOUNTER — OFFICE VISIT (OUTPATIENT)
Dept: CARDIOLOGY | Facility: CLINIC | Age: 73
End: 2023-10-27
Payer: MEDICARE

## 2023-10-27 VITALS
SYSTOLIC BLOOD PRESSURE: 134 MMHG | HEART RATE: 58 BPM | HEIGHT: 73 IN | DIASTOLIC BLOOD PRESSURE: 67 MMHG | WEIGHT: 208.2 LBS | BODY MASS INDEX: 27.59 KG/M2 | OXYGEN SATURATION: 98 %

## 2023-10-27 DIAGNOSIS — I10 ESSENTIAL HYPERTENSION: ICD-10-CM

## 2023-10-27 DIAGNOSIS — E85.4 CEREBRAL AMYLOID ANGIOPATHY: ICD-10-CM

## 2023-10-27 DIAGNOSIS — E78.2 MIXED HYPERLIPIDEMIA: ICD-10-CM

## 2023-10-27 DIAGNOSIS — I48.92 ATRIAL FLUTTER WITH CONTROLLED RESPONSE: Primary | ICD-10-CM

## 2023-10-27 DIAGNOSIS — N18.6 ESRD (END STAGE RENAL DISEASE): ICD-10-CM

## 2023-10-27 DIAGNOSIS — I68.0 CEREBRAL AMYLOID ANGIOPATHY: ICD-10-CM

## 2023-10-27 RX ORDER — NIFEDIPINE 60 MG/1
60 TABLET, EXTENDED RELEASE ORAL NIGHTLY
Start: 2023-10-27

## 2023-10-27 NOTE — PROGRESS NOTES
CARDIOLOGY    Date of Office Visit: 10/27/2023  Patient Name: Donald Johnson  : 1950  Encounter Provider: Nasim Barrow PA-C  Primary Cardiologist: Ulices Puga MD    CHIEF COMPLAINT / REASON FOR OFFICE VISIT     Follow up for atrial fibrillation, flutter      HISTORY OF PRESENT ILLNESS     This is a 73 y.o. year old male who presents to [facility] CARDIOLOGY for a for a 6 month follow up.     He previously underwent evaluation for a possible renal transplantation given his end-stage renal disease.  He was declined by the [facility] transplant team unless he could undergo an atrial flutter ablation.  An atrial flutter ablation cannot be completed as he is contraindicated for anticoagulation.  He has suspected cerebral amyloid angiopathy that increases his risk for bleeding as per neurology.     In the past he has a history of bradycardia that was induced with beta-blockers.    I had seen the patient in the office on 2023 after being recommended for evaluation by his hemodialysis nurse.  He has been having episodes of bradycardia into the 30s during dialysis.  He had started him on Procardia 30 mg daily.  He had reportedly been having the symptoms for over a year but had only had 1 minute during dialysis occurring 1 time with blurred vision.    I had evaluated the patient and referred him to Dr. Awad for evaluation.  He at that time reported walking 2 miles a day while he was not on dialysis and recommendations were made to continue monitoring and that he is not a candidate for a pacemaker at this time.  He did not recommend the patient for an atrial flutter ablation either because on his evaluation it did not cause significant symptoms.    Today the patient reports he has been doing fairly well with the past 2 weeks.  He continues to have dialysis and does note that his heart will be in the 40s during testing.  Resting heart rate today was in the 50s. He had been  "walking 2 miles on days off of dialysis but has slowed down on this. He has lost around 7 lbs in the past 6 months without trying. Blood pressure has been well controlled since blood pressure medication adjustments.     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      Typically he has 2.5 L fluid removal at dialysis. He continues to have low heart rates during dialysis into the 40s and sometimes high 30s without any dizziness or blurred vision again.      PMHx: Persistent atrial flutter, suspected sick sinus syndrome, hypertension, hyperlipidemia, diabetes mellitus type 2, end-stage renal disease  on HD MWF, left bundle branch block, cerebral amyloid angiopathy    PHYSICAL EXAMINATION     Vital Signs:  /67 (BP Location: Right arm, Patient Position: Sitting, Cuff Size: Adult)   Pulse 58   Ht 185.4 cm (73\")   Wt 94.4 kg (208 lb 3.2 oz)   SpO2 98%   BMI 27.47 kg/m²   Estimated body mass index is 27.47 kg/m² as calculated from the following:    Height as of this encounter: 185.4 cm (73\").    Weight as of this encounter: 94.4 kg (208 lb 3.2 oz).       Physical Exam  Constitutional:       Appearance: Normal appearance.   HENT:      Head: Normocephalic and atraumatic.   Cardiovascular:      Rate and Rhythm: Normal rate and regular rhythm.      Pulses: Normal pulses.      Heart sounds: Normal heart sounds.   Pulmonary:      Effort: Pulmonary effort is normal.      Breath sounds: Normal breath sounds.   Musculoskeletal:      Right lower leg: No edema.      Left lower leg: No edema.   Skin:     General: Skin is warm and dry.   Neurological:      General: No focal deficit present.      Mental Status: He is alert and oriented to person, place, and time.          Cardiac Testing/Results     Cardiac Testing:   - Echo 12/23/2022: Mildly dilated left ventricle.  EF 56 to 60%.  Mild LVH.  Severely dilated left atrium.  Calcified aortic valve.  Mild MR and TR.  RVSP 41 mmHg.  Ascending aorta dilated to 3.8 cm.     -Stress " "Test 5/24/2022: Normal myocardial perfusion.  EF 57%.    Result Review :  The following data was reviewed by: Nasim Barrow PA-C on 10/27/2023:       Lab Results   Component Value Date     (L) 11/13/2022     03/26/2022    K 4.4 11/13/2022    K 3.8 03/26/2022    CL 96 (L) 11/13/2022     03/26/2022    CO2 29.7 (H) 11/13/2022    CO2 24.0 03/26/2022    BUN 45 (H) 11/13/2022    BUN 25 (H) 03/26/2022    CREATININE 6.67 (H) 11/13/2022    CREATININE 4.24 (H) 03/26/2022    EGFRIFNONA 18 (L) 05/17/2021    EGFRIFNONA 11 (L) 04/01/2021    EGFRIFAFRI  04/01/2021      Comment:      <15 Indicative of kidney failure.    EGFRIFAFRI  03/02/2021      Comment:      <15 Indicative of kidney failure.    GLUCOSE 107 (H) 11/13/2022    GLUCOSE 131 (H) 03/26/2022    CALCIUM 9.4 11/13/2022    CALCIUM 8.6 03/26/2022    ALBUMIN 4.20 11/13/2022    ALBUMIN 4.5 09/06/2022    AST 16 11/13/2022    AST 14 09/06/2022    ALT 18 11/13/2022    ALT 16 09/06/2022     Lab Results   Component Value Date    WBC 5.23 11/13/2022    WBC 5.4 09/06/2022    HGB 12.4 (L) 11/13/2022    HGB 11.9 (L) 09/06/2022    HCT 38.2 11/13/2022    HCT 34.9 (L) 09/06/2022    .1 (H) 11/13/2022    MCV 96.3 09/06/2022     11/13/2022     09/06/2022     No results found for: \"PROBNP\", \"BNP\"  Lab Results   Component Value Date    CKTOTAL 64 03/31/2021    TROPONINT 0.026 03/26/2022     Lab Results   Component Value Date    TSH 0.554 04/01/2021    TSH 1.33 02/03/2016                          ASSESSMENT & PLAN       Diagnoses and all orders for this visit:    1. Atrial flutter with controlled response (Primary)  Longstanding history of persistent atrial flutter over the last year and a half.  He tends to have bradycardia with heart rates noted to be in the 30s with dialysis.  Baseline conduction disease with left bundle branch block.  I had referred him to electrophysiology for review and they are recommending continued monitoring at this " point as he is not a candidate for an atrial flutter ablation or pacemaker  [id number] score 4  He is not currently on anticoagulation due to history of cerebral amyloid angiopathy-this increases the risk for intercerebral hemorrhage continue Procardia 30  Avoid rate limiting medications.   2. Mixed hyperlipidemia  Goal LDL less than 55 per American diabetic Association.  Current LDL is 44.  Continue atorvastatin 40 mg daily  3. Essential hypertension  Keep blood pressure log at home.  Continue Procardia 60 mg daily and hydralazine 25 mg twice daily  4. ESRD (end stage renal disease)  He previously was being evaluated for renal artery transplant.  He is not a current candidate due to the risk of the patient being off anticoagulation in the setting of atrial flutter.  He is not on anticoagulation due to history of cerebral amyloid angiopathy  He is on dialysis Monday Wednesday Friday  5. Cerebral amyloid angiopathy  Noted in past on cerebral MRI.  He had follow-up with neurology previously    Follow Up:  Return in about 6 months (around 4/27/2024) for Dr. Puga-Routine.  Patient was given instructions and counseling regarding his condition or for health maintenance advice. Please contact office if worsening symptoms or proceed to ER when appropriate.      Nasim Barrow PA-C  10/27/23  12:55 EDT    MEDICATIONS         Discharge Medications            Accurate as of October 27, 2023 12:55 PM. If you have any questions, ask your nurse or doctor.                Continue These Medications        Instructions Start Date   allopurinol 300 MG tablet  Commonly known as: ZYLOPRIM   150 mg, Oral, Daily      aspirin 81 MG EC tablet   81 mg, Oral, Daily      atorvastatin 40 MG tablet  Commonly known as: LIPITOR   1 tablet, Oral, Every Morning      freestyle lancets   INJECT 1 INTO THE SKIN EVERY DAY      FREESTYLE LITE test strip  Generic drug: glucose blood   USE ONE STRIP AS DIRECTED ONCE DAILY      glimepiride 1  MG tablet  Commonly known as: AMARYL   1 mg, Oral, Every Morning Before Breakfast      hydrALAZINE 25 MG tablet  Commonly known as: APRESOLINE   25 mg, Oral, 2 Times Daily      MIRCERA IJ   50 mcg, Every 14 Days      NIFEdipine XL 30 MG 24 hr tablet  Commonly known as: Procardia XL   30 mg, Oral, Daily      ROXI-YULISSA PO   1 tablet, Oral, Daily      sennosides-docusate 8.6-50 MG per tablet  Commonly known as: PERICOLACE   2 tablets, Oral, Nightly      SENSIPAR PO   60 mg, Oral      sevelamer 800 MG tablet  Commonly known as: RENVELA   TAKE 4 TABLETS BY MOUTH THREE TIMES DAILY WITH MEALS AND 2 TABLETS  WITH A SNACK      tamsulosin 0.4 MG capsule 24 hr capsule  Commonly known as: FLOMAX   2 capsules, Oral, Nightly      Vitamin D3 10 MCG (400 UNIT) chewable tablet   Oral                   **Vanion Disclaimer: This note was dictated using an electronic transcription. The electronic translation of spoken language may permit erroneous, or at times, nonsensical words or phrases to be inadvertently transcribed. Although I have reviewed the note for such errors, some may still exist.

## 2023-12-11 ENCOUNTER — HOSPITAL ENCOUNTER (OUTPATIENT)
Facility: HOSPITAL | Age: 73
Discharge: HOME | End: 2023-12-11
Attending: EMERGENCY MEDICINE | Admitting: EMERGENCY MEDICINE
Payer: MEDICARE

## 2023-12-11 VITALS
RESPIRATION RATE: 18 BRPM | OXYGEN SATURATION: 99 % | WEIGHT: 200 LBS | DIASTOLIC BLOOD PRESSURE: 46 MMHG | TEMPERATURE: 99 F | HEART RATE: 69 BPM | BODY MASS INDEX: 26.51 KG/M2 | HEIGHT: 73 IN | SYSTOLIC BLOOD PRESSURE: 118 MMHG

## 2023-12-11 DIAGNOSIS — U07.1 COVID-19 VIRUS INFECTION: Primary | ICD-10-CM

## 2023-12-11 LAB
FLUAV SUBTYP SPEC NAA+PROBE: NOT DETECTED
FLUBV RNA ISLT QL NAA+PROBE: NOT DETECTED
SARS-COV-2 RNA RESP QL NAA+PROBE: DETECTED
STREP A PCR: NOT DETECTED

## 2023-12-11 PROCEDURE — 87636 SARSCOV2 & INF A&B AMP PRB: CPT

## 2023-12-11 PROCEDURE — 87651 STREP A DNA AMP PROBE: CPT

## 2023-12-11 PROCEDURE — G0463 HOSPITAL OUTPT CLINIC VISIT: HCPCS | Performed by: EMERGENCY MEDICINE

## 2023-12-11 RX ORDER — DEXTROMETHORPHAN HYDROBROMIDE AND PROMETHAZINE HYDROCHLORIDE 15; 6.25 MG/5ML; MG/5ML
5 SYRUP ORAL 4 TIMES DAILY PRN
Qty: 118 ML | Refills: 0 | Status: SHIPPED | OUTPATIENT
Start: 2023-12-11

## 2023-12-11 RX ORDER — ALBUTEROL SULFATE 90 UG/1
2 AEROSOL, METERED RESPIRATORY (INHALATION) EVERY 4 HOURS PRN
Qty: 6.7 G | Refills: 0 | Status: SHIPPED | OUTPATIENT
Start: 2023-12-11

## 2023-12-11 NOTE — FSED PROVIDER NOTE
Subjective   History of Present Illness  74yo male pmh significant htn/hyperlipidemia/dm2/atrial flutter/esrd, presents ED c/o 3d hx nonproductive cough/congestion/sore throat (resolved)/myalgias/rhinorrhea.  ROS (+) sick contact.    History provided by:  Patient  URI  Presenting symptoms: congestion, cough, fatigue, rhinorrhea and sore throat    Associated symptoms: myalgias        Review of Systems   Constitutional:  Positive for fatigue.   HENT:  Positive for congestion, rhinorrhea and sore throat.    Eyes: Negative.    Respiratory:  Positive for cough.    Cardiovascular: Negative.    Gastrointestinal: Negative.    Musculoskeletal:  Positive for myalgias.   Allergic/Immunologic: Positive for immunocompromised state.   All other systems reviewed and are negative.      Past Medical History:   Diagnosis Date    Anemia     Anticoagulated by anticoagulation treatment     eliquis    Arthritis     At risk for obstructive sleep apnea     stop bang #5    Atrial flutter     Cancer 2017    Left cheek, nonmelanoma    Chronic kidney disease     Coarctation of aorta     very mild, likely not hemodynamically significant    DDD (degenerative disc disease), lumbosacral     Diabetes mellitus     Type 2    H/O Chronic gout     H/O Lung mass 2016    History of cardiac cath     7/2013 with luminal irregularities, normal EF, normal LVEDP    Hyperlipidemia     Hypertension     Monoclonal gammopathy 2018    Obesity     PAD (peripheral artery disease)     PONV (postoperative nausea and vomiting)     Right kidney mass     Splenic artery aneurysm        No Known Allergies    Past Surgical History:   Procedure Laterality Date    ACHILLES TENDON SURGERY Right 1991    ARTERIOVENOUS FISTULA/SHUNT SURGERY Left 3/4/2021    Procedure: LEFT ARM radialcephalic FISTULA PLACEMENT;  Surgeon: Jm Huynh MD;  Location: [facility];  Service: Vascular;  Laterality: Left;    CARDIAC CATHETERIZATION      CHOLECYSTECTOMY      COLONOSCOPY       EPIDURAL BLOCK      MOHS SURGERY Left 07/20/2017    Cheek    NEPHRECTOMY PARTIAL Right 1/3/2020    Procedure: RIGHT LAPAROSCOPIC PARTIAL  NEPHRECTOMY;  Surgeon: Ean Chavez MD;  Location: [facility] OR;  Service: Urology    SKIN CANCER EXCISION  2014    Top of head x2    TONSILLECTOMY         Family History   Problem Relation Age of Onset    Hypertension Mother     Diabetes Mother     Heart disease Mother     Coronary artery disease Father         ARTERIOSCLEROSIS NONPREMATURE    Heart disease Father     Hypertension Father     Diabetes Father     Gallbladder disease Father     Heart attack Father     Diabetes Sister     Heart disease Sister     Hypertension Sister     Diabetes Brother     Gallbladder disease Brother     Heart disease Brother     Hypertension Brother     Aneurysm Brother     Malig Hyperthermia Neg Hx        Social History     Socioeconomic History    Marital status:      Spouse name: Chary    Number of children: 1    Years of education: High school   Tobacco Use    Smoking status: Never     Passive exposure: Yes    Smokeless tobacco: Never    Tobacco comments:     caffeine use - None   Vaping Use    Vaping Use: Never used   Substance and Sexual Activity    Alcohol use: No    Drug use: Never    Sexual activity: Defer           Objective   Physical Exam  Vitals and nursing note reviewed.   Constitutional:       Appearance: Normal appearance.   HENT:      Head: Normocephalic and atraumatic.      Right Ear: External ear normal.      Left Ear: External ear normal.      Nose: Nose normal.      Mouth/Throat:      Mouth: Mucous membranes are moist.      Pharynx: Oropharynx is clear.   Eyes:      Pupils: Pupils are equal, round, and reactive to light.   Cardiovascular:      Rate and Rhythm: Normal rate.      Pulses: Normal pulses.      Heart sounds: Normal heart sounds. No murmur heard.     No friction rub. No gallop.      Comments: LUE AV fistula (+) thrill  Pulmonary:      Effort:  Pulmonary effort is normal. No respiratory distress.      Breath sounds: Normal breath sounds. No wheezing, rhonchi or rales.   Abdominal:      General: Abdomen is flat. Bowel sounds are normal. There is no distension.      Palpations: Abdomen is soft.      Tenderness: There is no abdominal tenderness.   Musculoskeletal:         General: Normal range of motion.      Cervical back: Normal range of motion and neck supple. No rigidity.   Lymphadenopathy:      Cervical: No cervical adenopathy.   Skin:     General: Skin is warm and dry.   Neurological:      General: No focal deficit present.      Mental Status: He is alert and oriented to person, place, and time.      GCS: GCS eye subscore is 4. GCS verbal subscore is 5. GCS motor subscore is 6.      Sensory: Sensation is intact.      Motor: Motor function is intact.         Procedures           ED Course      Labs Reviewed   COVID-19 AND FLU A/B, NP SWAB IN TRANSPORT MEDIA 1 HR TAT - Abnormal; Notable for the following components:       Result Value    COVID19 Detected (*)     All other components within normal limits    Narrative:     Fact sheet for providers: https://www.fda.gov/media/404035/download    Fact sheet for patients: https://www.fda.gov/media/022677/download    Test performed by PCR.  Influenza A and Influenza B negative results should be considered presumptive in samples that have a positive SARS-CoV-2 result.    Competitive inhibition studies showed that SARS-CoV-2 virus, when present at concentrations above 3.6E+04 copies/mL, can inhibit the detection and amplification of influenza A and influenza B virus RNA if present at or below 1.8E+02 copies/mL or 4.9E+02 copies/mL, respectively, and may lead to false negative influenza virus results. If co-infection with influenza A or influenza B virus is suspected in samples with a positive SARS-CoV-2 result, the sample should be re-tested with another FDA cleared, approved, or authorized influenza test, if  influenza virus detection would change clinical management.   RAPID STREP A SCREEN - Normal                                          Medical Decision Making  Covid-19 (+).  AFVSS. Sao2 99% RA.  No clinical evidence lower respiratory tract involvement. Pt without history of vomiting.  Pt reports completed hemodialysis earlier today.  [facility] inpatient pharmacy consulted.  Paxlovid contraindicated secondary to ESRD/HD.  No contraindications/medication interactions with Molnupiravir.  Will rx Molnupiravir as patient has multiple high risk factors for covid-19 progression. Additional supportive care with phenergan dm prn/albuterol hfa prn.  Return precautions provided.    Problems Addressed:  COVID-19 virus infection: complicated acute illness or injury    Risk  Prescription drug management.        Final diagnoses:   COVID-19 virus infection       ED Disposition  ED Disposition       ED Disposition   Discharge    Condition   Good    Comment   --               Natanael Marshall MD  [address] [id number] [phone]    In 1 week           Medication List        New Prescriptions      albuterol sulfate  (90 Base) MCG/ACT inhaler  Commonly known as: PROVENTIL HFA;VENTOLIN HFA;PROAIR HFA  Inhale 2 puffs Every 4 (Four) Hours As Needed for Wheezing or Shortness of Air.     Molnupiravir 200 MG capsule  Commonly known as: LAGEVRIO  Take 4 capsules by mouth Every 12 (Twelve) Hours for 5 days.     promethazine-dextromethorphan 6.25-15 MG/5ML syrup  Commonly known as: PROMETHAZINE-DM  Take 5 mL by mouth 4 (Four) Times a Day As Needed for Cough.               Where to Get Your Medications        These medications were sent to [id number] - [place], [place] - [address] AT [facility] - [phone]  - [phone] FX  [address], [facility] [address]      Phone: [phone]   albuterol sulfate  (90 Base) MCG/ACT  inhaler  Molnupiravir 200 MG capsule  promethazine-dextromethorphan 6.25-15 MG/5ML syrup

## 2023-12-11 NOTE — DISCHARGE INSTRUCTIONS
Self quarantine x5 days/wear mask x10 days  Return ED fever, vomiting, dehydration, chest pain, shortness of air, worse condition, any other concerns

## 2024-01-22 ENCOUNTER — TELEPHONE (OUTPATIENT)
Dept: CARDIOLOGY | Facility: CLINIC | Age: 74
End: 2024-01-22

## 2024-01-22 NOTE — TELEPHONE ENCOUNTER
[facility] received a fax that requires your attention. The document has been indexed to the patient’s chart for your review.      Reason for sending: EXTERNAL MEDICAL RECORD NOTIFICATION     Documents Description: PHYS [place] [facility] CARDIAC CLEARANCE REQ-1.22.24    Name of Sender: [facility]     Date Indexed: 1.22.24

## 2024-01-23 NOTE — PROGRESS NOTES
Mr. Johnson is low risk for perioperative cardiovascular complications during cataract surgery.  He can proceed with surgery without any further cardiac specific testing or treatment.  Let me know if you have any questions.  Thank you

## 2024-01-26 ENCOUNTER — TELEPHONE (OUTPATIENT)
Dept: CARDIOLOGY | Facility: CLINIC | Age: 74
End: 2024-01-26

## 2024-01-26 NOTE — TELEPHONE ENCOUNTER
Caller: JEREMI    Relationship to patient: SELF    Best call back number: [phone]        Type of visit: FOLLOW UP        Additional notes: PT WAS AT DIALYSIS AND HAD AN EPISODE WHERE HE PASSED OUT.  THEY ASKED HIM TO FOLLOW UP WITH DR. KEITH-    UNAVAILABLE  MON, WED, FRI MORNINGS TILL AFTER 11:AM

## 2024-02-20 ENCOUNTER — OFFICE VISIT (OUTPATIENT)
Dept: CARDIOLOGY | Facility: CLINIC | Age: 74
End: 2024-02-20
Payer: MEDICARE

## 2024-02-20 VITALS
SYSTOLIC BLOOD PRESSURE: 110 MMHG | HEIGHT: 73 IN | DIASTOLIC BLOOD PRESSURE: 60 MMHG | WEIGHT: 212 LBS | HEART RATE: 54 BPM | BODY MASS INDEX: 28.1 KG/M2 | OXYGEN SATURATION: 98 %

## 2024-02-20 DIAGNOSIS — E78.2 MIXED HYPERLIPIDEMIA: ICD-10-CM

## 2024-02-20 DIAGNOSIS — I10 ESSENTIAL HYPERTENSION: ICD-10-CM

## 2024-02-20 DIAGNOSIS — I48.92 ATRIAL FLUTTER WITH CONTROLLED RESPONSE: Primary | ICD-10-CM

## 2024-02-20 PROCEDURE — 3078F DIAST BP <80 MM HG: CPT | Performed by: INTERNAL MEDICINE

## 2024-02-20 PROCEDURE — 3074F SYST BP LT 130 MM HG: CPT | Performed by: INTERNAL MEDICINE

## 2024-02-20 PROCEDURE — 99214 OFFICE O/P EST MOD 30 MIN: CPT | Performed by: INTERNAL MEDICINE

## 2024-02-20 NOTE — PROGRESS NOTES
PATIENTINFORMATION    Date of Office Visit: 2024  Encounter Provider: Ulices Puga MD  Place of Service: [facility] CARDIOLOGY  Patient Name: Donald Johnson  : 1950    Subjective:     Encounter Date:2024      Patient ID: Donald Johnson is a 73 y.o. male.    No chief complaint on file.    HPI  Mr. Johnson is a pleasant 72 yo who came to cardiology clinic for fu visit.  He had an episode of syncope in the middle of dialysis about 4 weeks ago and advised to see cardiology soon.  Probably it was a hypotensive episode.  He denies any new medications or any new symptoms on the morning of or before dialysis that day.  He felt presyncope before passing out.  Vital sign details unavailable.  He denies any recurrence but since then hydralazine has been discontinued.  Blood pressure runs normal during home monitoring.  He takes nifedipine 60 mg at night.  No other significant new complaints.  He admits he does not walk every day because he feels very tired.      ROS  All systems reviewed and negative except as noted in HPI.    Past Medical History:   Diagnosis Date    Anemia     Anticoagulated by anticoagulation treatment     eliquis    Arthritis     At risk for obstructive sleep apnea     stop bang #5    Atrial flutter     Cancer 2017    Left cheek, nonmelanoma    Chronic kidney disease     Coarctation of aorta     very mild, likely not hemodynamically significant    DDD (degenerative disc disease), lumbosacral     Diabetes mellitus     Type 2    H/O Chronic gout     H/O Lung mass 2016    History of cardiac cath     2013 with luminal irregularities, normal EF, normal LVEDP    Hyperlipidemia     Hypertension     Monoclonal gammopathy 2018    Obesity     PAD (peripheral artery disease)     PONV (postoperative nausea and vomiting)     Right kidney mass     Splenic artery aneurysm        Past Surgical History:   Procedure Laterality Date    ACHILLES TENDON SURGERY Right   "   ARTERIOVENOUS FISTULA/SHUNT SURGERY Left 3/4/2021    Procedure: LEFT ARM radialcephalic FISTULA PLACEMENT;  Surgeon: Jm Huynh MD;  Location: [facility] MAIN OR;  Service: Vascular;  Laterality: Left;    CARDIAC CATHETERIZATION      CHOLECYSTECTOMY      COLONOSCOPY      EPIDURAL BLOCK      MOHS SURGERY Left 07/20/2017    Cheek    NEPHRECTOMY PARTIAL Right 1/3/2020    Procedure: RIGHT LAPAROSCOPIC PARTIAL  NEPHRECTOMY;  Surgeon: Ean Chavez MD;  Location: [facility] MAIN OR;  Service: Urology    SKIN CANCER EXCISION  2014    Top of head x2    TONSILLECTOMY         Social History     Socioeconomic History    Marital status:      Spouse name: Chary    Number of children: 1    Years of education: High school   Tobacco Use    Smoking status: Never     Passive exposure: Yes    Smokeless tobacco: Never    Tobacco comments:     caffeine use - None   Vaping Use    Vaping Use: Never used   Substance and Sexual Activity    Alcohol use: No    Drug use: Never    Sexual activity: Defer       Family History   Problem Relation Age of Onset    Hypertension Mother     Diabetes Mother     Heart disease Mother     Coronary artery disease Father         ARTERIOSCLEROSIS NONPREMATURE    Heart disease Father     Hypertension Father     Diabetes Father     Gallbladder disease Father     Heart attack Father     Diabetes Sister     Heart disease Sister     Hypertension Sister     Diabetes Brother     Gallbladder disease Brother     Heart disease Brother     Hypertension Brother     Aneurysm Brother     Malig Hyperthermia Neg Hx          Procedures       Objective:     /60   Pulse 54   Ht 185.4 cm (73\")   Wt 96.2 kg (212 lb)   SpO2 98%   BMI 27.97 kg/m²  Body mass index is 27.97 kg/m².     Constitutional:       General: Not in acute distress.     Appearance: Well-developed. Not diaphoretic.   Eyes:      Pupils: Pupils are equal, round, and reactive to light.   HENT:      Head: Normocephalic and atraumatic. "   Neck:      Thyroid: No thyromegaly.   Pulmonary:      Effort: Pulmonary effort is normal. No respiratory distress.      Breath sounds: Normal breath sounds. No wheezing. No rales.   Chest:      Chest wall: Not tender to palpatation.   Cardiovascular:      Normal rate. Regular rhythm.      No gallop.    Pulses:     Intact distal pulses.   Edema:     Peripheral edema absent.   Abdominal:      General: Bowel sounds are normal. There is no distension.      Palpations: Abdomen is soft.      Tenderness: There is no guarding.   Musculoskeletal: Normal range of motion.         General: No deformity.      Cervical back: Normal range of motion and neck supple. Skin:     General: Skin is warm and dry.      Findings: No rash.   Neurological:      Mental Status: Alert and oriented to person, place, and time.      Cranial Nerves: No cranial nerve deficit.      Deep Tendon Reflexes: Reflexes are normal and symmetric.   Psychiatric:         Judgment: Judgment normal.         Review Of Data: I have reviewed pertinent recent labs, images and documents and pertinent findings included in HPI or assessment below.          Assessment/Plan:     Persistent atrial flutter that is rate controlled without AV kiley blockers. History of bradycardia with beta-blocker and rate controlled off AV kiley blocker. Underlying LBBB.Off anticoagulation because of MRI showing amyloid angiopathy and increased risk of bleeding-His neurologist said it is contraindicated to anticoagulate him. Echocardiogram in December 2022: Moderate LVH but preserved function, severely dilated left atrial cavity, mild pulmonary hypertension, mild MR/TR. Currently on aspirin 81 mg p.o. daily.  Hypertension-fairly controlled on nifedipine currently.  End-stage renal disease on hemodialysis using left forearm AV fistula. Transplant declined by [facility] , [facility] and [facility].  Gets dialysis on Monday Wednesday and Friday.  Peripheral arterial  disease-continue high intensity statin  Hypercholesterolemia-on high intensity statin-lipid panel at goal  Suspected cerebral amyloid angiopathy-increased risk of bleeding with anticoagulation.  Optimize blood pressure   Episode of syncope during dialysis in January 2024-Rolison discontinued since then.    He will start taking nifedipine 30 mg p.o. twice daily instead of 60 once a day to see if that helps with frequency of low blood pressure.  He will skip dose of morning of dialysis.  Otherwise continue current care  Follow-up in 6 months or sooner with concerns.    Diagnosis and plan of care discussed with patient and verbalized understanding.            Your medication list            Accurate as of February 20, 2024  4:02 PM. If you have any questions, ask your nurse or doctor.                CONTINUE taking these medications        Instructions Last Dose Given Next Dose Due   albuterol sulfate  (90 Base) MCG/ACT inhaler  Commonly known as: PROVENTIL HFA;VENTOLIN HFA;PROAIR HFA      Inhale 2 puffs Every 4 (Four) Hours As Needed for Wheezing or Shortness of Air.       allopurinol 300 MG tablet  Commonly known as: ZYLOPRIM      Take 0.5 tablets by mouth Daily.       aspirin 81 MG EC tablet      Take 1 tablet by mouth Daily.       atorvastatin 40 MG tablet  Commonly known as: LIPITOR      Take 1 tablet by mouth Every Morning.       freestyle lancets      INJECT 1 INTO THE SKIN EVERY DAY       FREESTYLE LITE test strip  Generic drug: glucose blood      USE ONE STRIP AS DIRECTED ONCE DAILY       glimepiride 1 MG tablet  Commonly known as: AMARYL      Take 1 tablet by mouth Every Morning Before Breakfast.       hydrALAZINE 25 MG tablet  Commonly known as: APRESOLINE      Take 1 tablet by mouth 2 (Two) Times a Day.       MIRCERA IJ      50 mcg Every 14 (Fourteen) Days.       NIFEdipine XL 60 MG 24 hr tablet  Commonly known as: Procardia XL      Take 1 tablet by mouth Every Night.        promethazine-dextromethorphan 6.25-15 MG/5ML syrup  Commonly known as: PROMETHAZINE-DM      Take 5 mL by mouth 4 (Four) Times a Day As Needed for Cough.       ROXI-YULISSA PO      Take 1 tablet by mouth Daily.       sennosides-docusate 8.6-50 MG per tablet  Commonly known as: PERICOLACE      Take 2 tablets by mouth Every Night.       SENSIPAR PO      Take 60 mg by mouth.       sevelamer 800 MG tablet  Commonly known as: RENVELA      TAKE 4 TABLETS BY MOUTH THREE TIMES DAILY WITH MEALS AND 2 TABLETS  WITH A SNACK       tamsulosin 0.4 MG capsule 24 hr capsule  Commonly known as: FLOMAX      Take 2 capsules by mouth Every Night.       Vitamin D3 10 MCG (400 UNIT) chewable tablet      Chew.                    Ulices Puga MD  02/20/24  16:02 EST

## 2024-03-12 DIAGNOSIS — N18.6 END STAGE RENAL DISEASE: Primary | ICD-10-CM

## 2024-04-05 ENCOUNTER — TELEPHONE (OUTPATIENT)
Age: 74
End: 2024-04-05
Payer: MEDICARE

## 2024-04-05 NOTE — TELEPHONE ENCOUNTER
Spoke with Emmanuel at HD center who states pt has been having low access flows between 300 and 500. Has recently had an US which shown narrowing. Scheduled pt for fistulagram next week.

## 2024-04-09 ENCOUNTER — OUTSIDE FACILITY SERVICE (OUTPATIENT)
Age: 74
End: 2024-04-09
Payer: MEDICARE

## 2024-07-22 PROBLEM — Z99.2: Status: ACTIVE | Noted: 2024-07-22

## 2024-07-23 ENCOUNTER — HOSPITAL ENCOUNTER (OUTPATIENT)
Facility: HOSPITAL | Age: 74
Discharge: HOME | End: 2024-07-23
Admitting: NURSE PRACTITIONER
Payer: MEDICARE

## 2024-07-23 ENCOUNTER — OFFICE VISIT (OUTPATIENT)
Age: 74
End: 2024-07-23
Payer: MEDICARE

## 2024-07-23 VITALS
SYSTOLIC BLOOD PRESSURE: 137 MMHG | HEIGHT: 73 IN | DIASTOLIC BLOOD PRESSURE: 56 MMHG | HEART RATE: 51 BPM | WEIGHT: 204 LBS | BODY MASS INDEX: 27.04 KG/M2

## 2024-07-23 DIAGNOSIS — I72.8 ANEURYSM OF SPLENIC ARTERY: ICD-10-CM

## 2024-07-23 DIAGNOSIS — N18.6 END STAGE RENAL DISEASE: ICD-10-CM

## 2024-07-23 DIAGNOSIS — N18.6 ESRD (END STAGE RENAL DISEASE): Primary | ICD-10-CM

## 2024-07-23 LAB
BH CV VAS DIALYSIS ARTERIAL ANASTOMOSIS DIAMETER: 0.35 CM
BH CV VAS DIALYSIS ARTERIAL ANASTOMOSIS EDV: 49 CM/SEC
BH CV VAS DIALYSIS ARTERIAL ANASTOMOSIS PSV: 346 CM/SEC
BH CV VAS DIALYSIS CONDUIT DIST DEPTH: 0.36 CM
BH CV VAS DIALYSIS CONDUIT DIST DIAMETER: 0.41 CM
BH CV VAS DIALYSIS CONDUIT DIST EDV: 45 CM/SEC
BH CV VAS DIALYSIS CONDUIT DIST PSV: 93 CM/SEC
BH CV VAS DIALYSIS CONDUIT MID DEPTH: 0.2 CM
BH CV VAS DIALYSIS CONDUIT MID DIAMETER: 0.54 CM
BH CV VAS DIALYSIS CONDUIT MID EDV: 126 CM/SEC
BH CV VAS DIALYSIS CONDUIT MID PSV: 221 CM/SEC
BH CV VAS DIALYSIS CONDUIT MID/DIST DEPTH: 0.46 CM
BH CV VAS DIALYSIS CONDUIT MID/DIST DIAMETER: 0.46 CM
BH CV VAS DIALYSIS CONDUIT MID/DIST EDV: 47 CM/SEC
BH CV VAS DIALYSIS CONDUIT MID/DIST FLOW VOL: 518 ML/MIN
BH CV VAS DIALYSIS CONDUIT MID/DIST PSV: 81 CM/SEC
BH CV VAS DIALYSIS CONDUIT PROX DEPTH: 0.32 CM
BH CV VAS DIALYSIS CONDUIT PROX DIAMETER: 0.21 CM
BH CV VAS DIALYSIS CONDUIT PROX EDV: 296 CM/SEC
BH CV VAS DIALYSIS CONDUIT PROX PSV: 729 CM/SEC
BH CV VAS DIALYSIS CONDUIT PROX/MID DEPTH: 0.16 CM
BH CV VAS DIALYSIS CONDUIT PROX/MID DIAMETER: 0.74 CM
BH CV VAS DIALYSIS CONDUIT PROX/MID EDV: 107 CM/SEC
BH CV VAS DIALYSIS CONDUIT PROX/MID PSV: 269 CM/SEC
BH CV VAS DIALYSIS PRE-INFLOW BRACHIAL DIAMETER: 0.42 CM
BH CV VAS DIALYSIS PRE-INFLOW BRACHIAL EDV: 81 CM/SEC
BH CV VAS DIALYSIS PRE-INFLOW BRACHIAL FLOW VOL: 485 ML/MIN
BH CV VAS DIALYSIS PRE-INFLOW BRACHIAL PSV: 204 CM/SEC
BH CV VAS DIALYSIS VENOUS OUTFLOW CEPHALIC ARCH DIAMETE: 0.5 CM
BH CV VAS DIALYSIS VENOUS OUTFLOW CEPHALIC ARCH EDV: 40 CM/SEC
BH CV VAS DIALYSIS VENOUS OUTFLOW CEPHALIC ARCH PSV: 93 CM/SEC
BH CV VAS DIALYSIS VENOUS OUTFLOW CEPHALIC VEIN DIAMETE: 0.42 CM
BH CV VAS DIALYSIS VENOUS OUTFLOW CEPHALIC VEIN EDV: 35 CM/SEC
BH CV VAS DIALYSIS VENOUS OUTFLOW CEPHALIC VEIN PSV: 69 CM/SEC
BH CV VAS DIALYSIS VENOUS OUTFLOW SUBCL-CEPHALIC JX DIAMETER: 0.29 CM
BH CV VAS DIALYSIS VENOUS OUTFLOW SUBCL-CEPHALIC JX EDV: 14 CM/SEC
BH CV VAS DIALYSIS VENOUS OUTFLOW SUBCL-CEPHALIC JX PSV: 87 CM/SEC
BH CV VAS DIALYSIS VENOUS OUTFLOW SUBCLAVIAN DIAMETER: 0.9 CM
BH CV VAS DIALYSIS VENOUS OUTFLOW SUBCLAVIAN EDV: 17 CM/SEC
BH CV VAS DIALYSIS VENOUS OUTFLOW SUBCLAVIAN PSV: 69 CM/SEC

## 2024-07-23 PROCEDURE — 93990 DOPPLER FLOW TESTING: CPT

## 2024-09-10 ENCOUNTER — OFFICE VISIT (OUTPATIENT)
Dept: CARDIOLOGY | Facility: CLINIC | Age: 74
End: 2024-09-10
Payer: MEDICARE

## 2024-09-10 VITALS
BODY MASS INDEX: 28.63 KG/M2 | DIASTOLIC BLOOD PRESSURE: 60 MMHG | HEIGHT: 73 IN | WEIGHT: 216 LBS | HEART RATE: 68 BPM | OXYGEN SATURATION: 99 % | SYSTOLIC BLOOD PRESSURE: 110 MMHG

## 2024-09-10 DIAGNOSIS — I10 ESSENTIAL HYPERTENSION: ICD-10-CM

## 2024-09-10 DIAGNOSIS — R06.02 EXERTIONAL SHORTNESS OF BREATH: ICD-10-CM

## 2024-09-10 DIAGNOSIS — E78.00 HYPERCHOLESTEROLEMIA: ICD-10-CM

## 2024-09-10 DIAGNOSIS — I48.92 ATRIAL FLUTTER WITH CONTROLLED RESPONSE: Primary | ICD-10-CM

## 2024-09-10 DIAGNOSIS — I68.0 CEREBRAL AMYLOID ANGIOPATHY: ICD-10-CM

## 2024-09-10 DIAGNOSIS — E85.4 CEREBRAL AMYLOID ANGIOPATHY: ICD-10-CM

## 2024-09-10 PROCEDURE — 93000 ELECTROCARDIOGRAM COMPLETE: CPT | Performed by: INTERNAL MEDICINE

## 2024-09-10 PROCEDURE — 3078F DIAST BP <80 MM HG: CPT | Performed by: INTERNAL MEDICINE

## 2024-09-10 PROCEDURE — 3074F SYST BP LT 130 MM HG: CPT | Performed by: INTERNAL MEDICINE

## 2024-09-10 PROCEDURE — 99214 OFFICE O/P EST MOD 30 MIN: CPT | Performed by: INTERNAL MEDICINE

## 2024-09-10 NOTE — PROGRESS NOTES
PATIENTINFORMATION    Date of Office Visit: 09/10/2024  Encounter Provider: Ulices Puga MD  Place of Service: [facility] CARDIOLOGY  Patient Name: Donald Johnson  : 1950    Subjective:     Encounter Date:09/10/2024      Patient ID: Donald Johnson is a 74 y.o. male.    Chief Complaint   Patient presents with    Atrial Flutter       HPI  Mr. Johnson is a pleasant 74 years old gentleman who came to cardiology clinic for follow-up visit.  He reports increased exertional shortness of breath over the last few months.  He used to walk a mile and a half almost every day without significant symptoms but few days ago he could not finish half a mile walk because of increased shortness of breath.  He denies significant chest discomfort.  No orthopnea PND, palpitations, presyncope syncope.  Compliant to his medications and dialysis schedule.  He denies putting up weight.  He had an episode of hypotension during dialysis today that needed treatment with IV fluid administration.  Heart rate mostly in the 40s but picks up during exercise and activities.    ROS  All systems reviewed and negative except as noted in HPI.    Past Medical History:   Diagnosis Date    Anemia     Aneurysm of other specified arteries 2016    Anticoagulated by anticoagulation treatment     eliquis    Arthritis     At risk for obstructive sleep apnea     stop bang #5    Atrial flutter     Cancer 2017    Left cheek, nonmelanoma    Chronic kidney disease     Coarctation of aorta     very mild, likely not hemodynamically significant    DDD (degenerative disc disease), lumbosacral     Dependence on renal dialysis 2021    Diabetes mellitus     Type 2    End stage renal disease 2020    H/O Chronic gout     H/O Lung mass 2016    History of cardiac cath     2013 with luminal irregularities, normal EF, normal LVEDP    Hyperlipidemia     Hypertension     Monoclonal gammopathy 2018    Obesity     PAD  (peripheral artery disease)     PONV (postoperative nausea and vomiting)     Right kidney mass     Splenic artery aneurysm     Type 2 diabetes mellitus with hyperglycemia 03/16/2016    with PVD       Past Surgical History:   Procedure Laterality Date    ACHILLES TENDON SURGERY Right 1991    ARTERIOVENOUS FISTULA/SHUNT SURGERY Left 03/04/2021    Procedure: LEFT ARM radialcephalic FISTULA PLACEMENT;  Surgeon: Jm Huynh MD;  Location: [facility] MAIN OR;  Service: Vascular;  Laterality: Left;    CARDIAC CATHETERIZATION      CARDIAC CATHETERIZATION  07/09/2013    CHOLECYSTECTOMY      COLONOSCOPY      EPIDURAL BLOCK      MOHS SURGERY Left 07/20/2017    Cheek    NEPHRECTOMY PARTIAL Right 01/03/2020    Procedure: RIGHT LAPAROSCOPIC PARTIAL  NEPHRECTOMY;  Surgeon: Ean Chavez MD;  Location: [facility] OR;  Service: Urology    SKIN CANCER EXCISION  2014    Top of head x2    TONSILLECTOMY         Social History     Socioeconomic History    Marital status:      Spouse name: Chary    Number of children: 1    Years of education: High school   Tobacco Use    Smoking status: Never     Passive exposure: Yes    Smokeless tobacco: Never    Tobacco comments:     caffeine use - None; Patient does not smoke   Vaping Use    Vaping status: Never Used   Substance and Sexual Activity    Alcohol use: No     Comment: Patient is non drinker    Drug use: Never     Comment: Drug Abuse: No drug abuse    Sexual activity: Defer       Family History   Problem Relation Age of Onset    Hypertension Mother     Diabetes Mother     Heart disease Mother     Coronary artery disease Father         ARTERIOSCLEROSIS NONPREMATURE    Heart disease Father     Hypertension Father     Diabetes Father     Gallbladder disease Father     Heart attack Father     Diabetes Sister     Heart disease Sister     Hypertension Sister     Diabetes Brother     Gallbladder disease Brother     Heart disease Brother     Hypertension Brother     Aneurysm  "Brother     Heart disease Other     Stroke Other     Aneurysm Other     Malig Hyperthermia Neg Hx            ECG 12 Lead    Date/Time: 9/10/2024 4:04 PM  Performed by: Ulices Puga MD    Authorized by: Ulices Puga MD  Comparison: compared with previous ECG from 9/20/2023  Rhythm: atrial fibrillation  Rate: normal  Conduction: left bundle branch block  ST Segments: ST segments normal  T Waves: T waves normal  QRS axis: normal  Other: no other findings    Clinical impression: abnormal EKG             Objective:     /60   Pulse 68   Ht 185.4 cm (73\")   Wt 98 kg (216 lb)   SpO2 99%   BMI 28.50 kg/m²  Body mass index is 28.5 kg/m².     Constitutional:       General: Not in acute distress.     Appearance: Well-developed. Not diaphoretic.   Eyes:      Pupils: Pupils are equal, round, and reactive to light.   HENT:      Head: Normocephalic and atraumatic.   Neck:      Thyroid: No thyromegaly.   Pulmonary:      Effort: Pulmonary effort is normal. No respiratory distress.      Breath sounds: Normal breath sounds. No wheezing. No rales.   Chest:      Chest wall: Not tender to palpatation.   Cardiovascular:      Normal rate. Regular rhythm.      No gallop.    Pulses:     Intact distal pulses.   Edema:     Peripheral edema absent.   Abdominal:      General: Bowel sounds are normal. There is no distension.      Palpations: Abdomen is soft.      Tenderness: There is no guarding.   Musculoskeletal: Normal range of motion.         General: No deformity.      Cervical back: Normal range of motion and neck supple. Skin:     General: Skin is warm and dry.      Findings: No rash.   Neurological:      Mental Status: Alert and oriented to person, place, and time.      Cranial Nerves: No cranial nerve deficit.      Deep Tendon Reflexes: Reflexes are normal and symmetric.   Psychiatric:         Judgment: Judgment normal.       Review Of Data: I have reviewed pertinent recent labs, images and documents and " pertinent findings included in HPI or assessment below.          Assessment/Plan:      Persistent atrial flutter that is rate controlled without AV kiley blockers. History of bradycardia with beta-blocker and rate controlled off AV kliey blocker. Underlying LBBB.Off anticoagulation because of MRI showing amyloid angiopathy and increased risk of bleeding-His neurologist deemed he is not candidate for chronic AC. Echocardiogram in December 2022: Moderate LVH but preserved left ventricular function, severely dilated left atrial cavity, mild pulmonary hypertension, mild MR/TR. Currently on aspirin 81 mg p.o. daily.  Hypertension  End-stage renal disease on hemodialysis using left forearm AV fistula. Transplant declined by [facility] , [facility] and [place].  Gets dialysis on Monday Wednesday and Friday.  Peripheral arterial disease-continue high intensity statin  Hypercholesterolemia-on high intensity statin-lipid panel at goal  Suspected cerebral amyloid angiopathy-increased risk of bleeding with anticoagulation.  Optimize blood pressure   Episode of syncope during dialysis in January 2024-Rolison discontinued since then.    Vital signs within range  Worsening exertional shortness of breath.  I will send him for stress echo.    Diagnosis and plan of care discussed with patient and verbalized understanding.            Your medication list            Accurate as of September 10, 2024  1:44 PM. If you have any questions, ask your nurse or doctor.                CONTINUE taking these medications        Instructions Last Dose Given Next Dose Due   albuterol sulfate  (90 Base) MCG/ACT inhaler  Commonly known as: PROVENTIL HFA;VENTOLIN HFA;PROAIR HFA      Inhale 2 puffs Every 4 (Four) Hours As Needed for Wheezing or Shortness of Air.       allopurinol 300 MG tablet  Commonly known as: ZYLOPRIM      Take 0.5 tablets by mouth Daily.       aspirin 81 MG EC tablet      Take 1 tablet by mouth  Daily.       atorvastatin 40 MG tablet  Commonly known as: LIPITOR      Take 1 tablet by mouth Every Morning.       freestyle lancets      INJECT 1 INTO THE SKIN EVERY DAY       FREESTYLE LITE test strip  Generic drug: glucose blood      USE ONE STRIP AS DIRECTED ONCE DAILY       glimepiride 1 MG tablet  Commonly known as: AMARYL      Take 1 tablet by mouth Every Morning Before Breakfast.       hydrALAZINE 25 MG tablet  Commonly known as: APRESOLINE      Take 1 tablet by mouth 2 (Two) Times a Day.       MIRCERA IJ      50 mcg Every 14 (Fourteen) Days.       NIFEdipine XL 60 MG 24 hr tablet  Commonly known as: Procardia XL      Take 1 tablet by mouth Every Night.       promethazine-dextromethorphan 6.25-15 MG/5ML syrup  Commonly known as: PROMETHAZINE-DM      Take 5 mL by mouth 4 (Four) Times a Day As Needed for Cough.       ROXI-YULISSA PO      Take 1 tablet by mouth Daily.       sennosides-docusate 8.6-50 MG per tablet  Commonly known as: PERICOLACE      Take 2 tablets by mouth Every Night.       SENSIPAR PO      Take 60 mg by mouth.       sevelamer 800 MG tablet  Commonly known as: RENVELA      TAKE 4 TABLETS BY MOUTH THREE TIMES DAILY WITH MEALS AND 2 TABLETS  WITH A SNACK       tamsulosin 0.4 MG capsule 24 hr capsule  Commonly known as: FLOMAX      Take 2 capsules by mouth Every Night.       Vitamin D3 10 MCG (400 UNIT) chewable tablet      Chew.                    Ulices Puga MD  09/10/24  13:44 EDT

## 2024-09-23 ENCOUNTER — TELEPHONE (OUTPATIENT)
Dept: CARDIOLOGY | Facility: CLINIC | Age: 74
End: 2024-09-23
Payer: MEDICARE

## 2024-09-24 ENCOUNTER — HOSPITAL ENCOUNTER (OUTPATIENT)
Dept: CARDIOLOGY | Facility: HOSPITAL | Age: 74
Discharge: HOME | End: 2024-09-24
Admitting: INTERNAL MEDICINE
Payer: MEDICARE

## 2024-09-24 VITALS
BODY MASS INDEX: 28.63 KG/M2 | WEIGHT: 216 LBS | SYSTOLIC BLOOD PRESSURE: 120 MMHG | HEIGHT: 73 IN | HEART RATE: 50 BPM | DIASTOLIC BLOOD PRESSURE: 56 MMHG

## 2024-09-24 DIAGNOSIS — R06.02 EXERTIONAL SHORTNESS OF BREATH: ICD-10-CM

## 2024-09-24 LAB
AORTIC DIMENSIONLESS INDEX: 0.5 (DI)
ASCENDING AORTA: 3.7 CM
BH CV ECHO MEAS - ACS: 2.6 CM
BH CV ECHO MEAS - AO MAX PG: 13.1 MMHG
BH CV ECHO MEAS - AO MEAN PG: 7.6 MMHG
BH CV ECHO MEAS - AO ROOT AREA (BSA CORRECTED): 1.8 CM2
BH CV ECHO MEAS - AO ROOT DIAM: 3.8 CM
BH CV ECHO MEAS - AO V2 MAX: 181 CM/SEC
BH CV ECHO MEAS - AO V2 VTI: 45.4 CM
BH CV ECHO MEAS - AVA(I,D): 1.73 CM2
BH CV ECHO MEAS - EDV(MOD-SP4): 116 ML
BH CV ECHO MEAS - EF(MOD-BP): 64 %
BH CV ECHO MEAS - EF(MOD-SP4): 72.4 %
BH CV ECHO MEAS - ESV(MOD-SP4): 32 ML
BH CV ECHO MEAS - IVSD: 0.7 CM
BH CV ECHO MEAS - LAT PEAK E' VEL: 15.1 CM/SEC
BH CV ECHO MEAS - LV DIASTOLIC VOL/BSA (35-75): 52.2 CM2
BH CV ECHO MEAS - LV MAX PG: 2.8 MMHG
BH CV ECHO MEAS - LV MEAN PG: 1.62 MMHG
BH CV ECHO MEAS - LV SYSTOLIC VOL/BSA (12-30): 14.4 CM2
BH CV ECHO MEAS - LV V1 MAX: 84.4 CM/SEC
BH CV ECHO MEAS - LV V1 VTI: 21.3 CM
BH CV ECHO MEAS - LVIDD: 6.8 CM
BH CV ECHO MEAS - LVIDS: 4.3 CM
BH CV ECHO MEAS - LVOT AREA: 3.7 CM2
BH CV ECHO MEAS - LVOT DIAM: 2.17 CM
BH CV ECHO MEAS - LVPWD: 0.7 CM
BH CV ECHO MEAS - MED PEAK E' VEL: 7.1 CM/SEC
BH CV ECHO MEAS - MR MAX PG: 79.3 MMHG
BH CV ECHO MEAS - MR MAX VEL: 445.2 CM/SEC
BH CV ECHO MEAS - MV A DUR: 0.11 SEC
BH CV ECHO MEAS - MV A MAX VEL: 49.5 CM/SEC
BH CV ECHO MEAS - MV DEC SLOPE: 616.8 CM/SEC2
BH CV ECHO MEAS - MV DEC TIME: 0.17 SEC
BH CV ECHO MEAS - MV E MAX VEL: 91.7 CM/SEC
BH CV ECHO MEAS - MV E/A: 1.85
BH CV ECHO MEAS - MV MAX PG: 6.6 MMHG
BH CV ECHO MEAS - MV MEAN PG: 2.28 MMHG
BH CV ECHO MEAS - MV P1/2T: 60.6 MSEC
BH CV ECHO MEAS - MV V2 VTI: 34 CM
BH CV ECHO MEAS - MVA(P1/2T): 3.6 CM2
BH CV ECHO MEAS - MVA(VTI): 2.3 CM2
BH CV ECHO MEAS - PA V2 MAX: 110.3 CM/SEC
BH CV ECHO MEAS - PULM A REVS DUR: 0.11 SEC
BH CV ECHO MEAS - PULM A REVS VEL: 27.3 CM/SEC
BH CV ECHO MEAS - PULM DIAS VEL: 28.9 CM/SEC
BH CV ECHO MEAS - PULM S/D: 1.07
BH CV ECHO MEAS - PULM SYS VEL: 30.8 CM/SEC
BH CV ECHO MEAS - QP/QS: 0.5
BH CV ECHO MEAS - RAP SYSTOLE: 3 MMHG
BH CV ECHO MEAS - RV MAX PG: 1.01 MMHG
BH CV ECHO MEAS - RV V1 MAX: 50.1 CM/SEC
BH CV ECHO MEAS - RV V1 VTI: 9.2 CM
BH CV ECHO MEAS - RVOT DIAM: 2.33 CM
BH CV ECHO MEAS - RVSP: 36 MMHG
BH CV ECHO MEAS - SV(LVOT): 78.4 ML
BH CV ECHO MEAS - SV(MOD-SP4): 84 ML
BH CV ECHO MEAS - SV(RVOT): 39.2 ML
BH CV ECHO MEAS - SVI(LVOT): 35.3 ML/M2
BH CV ECHO MEAS - SVI(MOD-SP4): 37.8 ML/M2
BH CV ECHO MEAS - TAPSE (>1.6): 3 CM
BH CV ECHO MEAS - TR MAX PG: 33.4 MMHG
BH CV ECHO MEAS - TR MAX VEL: 289 CM/SEC
BH CV ECHO MEASUREMENTS AVERAGE E/E' RATIO: 8.26
BH CV STRESS BP STAGE 1: NORMAL
BH CV STRESS BP STAGE 2: NORMAL
BH CV STRESS DURATION MIN STAGE 1: 3
BH CV STRESS DURATION MIN STAGE 2: 3
BH CV STRESS DURATION MIN STAGE 3: 0
BH CV STRESS DURATION SEC STAGE 1: 0
BH CV STRESS DURATION SEC STAGE 2: 0
BH CV STRESS DURATION SEC STAGE 3: 30
BH CV STRESS ECHO POST STRESS EJECTION FRACTION EF: 73 %
BH CV STRESS GRADE STAGE 1: 10
BH CV STRESS GRADE STAGE 2: 12
BH CV STRESS GRADE STAGE 3: 14
BH CV STRESS HR STAGE 1: 77
BH CV STRESS HR STAGE 2: 103
BH CV STRESS HR STAGE 3: 103
BH CV STRESS METS STAGE 1: 5
BH CV STRESS METS STAGE 2: 7.5
BH CV STRESS METS STAGE 3: 8
BH CV STRESS PROTOCOL 1: NORMAL
BH CV STRESS RECOVERY HR: 73 BPM
BH CV STRESS SPEED STAGE 1: 1.7
BH CV STRESS SPEED STAGE 2: 2.5
BH CV STRESS SPEED STAGE 3: 3.4
BH CV STRESS STAGE 1: 1
BH CV STRESS STAGE 2: 2
BH CV STRESS STAGE 3: 3
BH CV XLRA - TDI S': 13.5 CM/SEC
LEFT ATRIUM VOLUME INDEX: 55.9 ML/M2
MAXIMAL PREDICTED HEART RATE: 148 BPM
PERCENT MAX PREDICTED HR: 69.59 %
SINUS: 3.9 CM
STJ: 3 CM
STRESS BASELINE BP: NORMAL MMHG
STRESS BASELINE HR: 52 BPM
STRESS PERCENT HR: 82 %
STRESS POST ESTIMATED WORKLOAD: 8 METS
STRESS POST EXERCISE DUR MIN: 6 MIN
STRESS POST EXERCISE DUR SEC: 30 SEC
STRESS POST PEAK BP: NORMAL MMHG
STRESS POST PEAK HR: 103 BPM
STRESS TARGET HR: 126 BPM

## 2024-09-24 PROCEDURE — 25510000001 PERFLUTREN 6.52 MG/ML SUSPENSION 2 ML VIAL: Performed by: INTERNAL MEDICINE

## 2024-09-24 PROCEDURE — 93350 STRESS TTE ONLY: CPT | Performed by: INTERNAL MEDICINE

## 2024-09-24 PROCEDURE — 93320 DOPPLER ECHO COMPLETE: CPT

## 2024-09-24 PROCEDURE — 93325 DOPPLER ECHO COLOR FLOW MAPG: CPT | Performed by: INTERNAL MEDICINE

## 2024-09-24 PROCEDURE — 93017 CV STRESS TEST TRACING ONLY: CPT

## 2024-09-24 PROCEDURE — 93350 STRESS TTE ONLY: CPT

## 2024-09-24 PROCEDURE — 93352 ADMIN ECG CONTRAST AGENT: CPT | Performed by: INTERNAL MEDICINE

## 2024-09-24 PROCEDURE — 93018 CV STRESS TEST I&R ONLY: CPT | Performed by: INTERNAL MEDICINE

## 2024-09-24 PROCEDURE — 93325 DOPPLER ECHO COLOR FLOW MAPG: CPT

## 2024-09-24 PROCEDURE — 93320 DOPPLER ECHO COMPLETE: CPT | Performed by: INTERNAL MEDICINE

## 2024-09-24 PROCEDURE — 93016 CV STRESS TEST SUPVJ ONLY: CPT | Performed by: INTERNAL MEDICINE

## 2024-09-24 RX ADMIN — PERFLUTREN 3 ML: 6.52 INJECTION, SUSPENSION INTRAVENOUS at 10:42

## 2024-11-05 ENCOUNTER — OFFICE VISIT (OUTPATIENT)
Age: 74
End: 2024-11-05
Payer: MEDICARE

## 2024-11-05 ENCOUNTER — HOSPITAL ENCOUNTER (OUTPATIENT)
Facility: HOSPITAL | Age: 74
Discharge: HOME | End: 2024-11-05
Payer: MEDICARE

## 2024-11-05 VITALS
BODY MASS INDEX: 27.17 KG/M2 | WEIGHT: 205 LBS | HEIGHT: 73 IN | HEART RATE: 67 BPM | SYSTOLIC BLOOD PRESSURE: 131 MMHG | DIASTOLIC BLOOD PRESSURE: 57 MMHG

## 2024-11-05 DIAGNOSIS — N18.6 ESRD (END STAGE RENAL DISEASE): ICD-10-CM

## 2024-11-05 DIAGNOSIS — I72.8 ANEURYSM OF SPLENIC ARTERY: ICD-10-CM

## 2024-11-05 DIAGNOSIS — N18.6 ESRD (END STAGE RENAL DISEASE): Primary | ICD-10-CM

## 2024-11-05 LAB
BH CV VAS DIALYSIS ARTERIAL ANASTOMOSIS DIAMETER: 0.3 CM
BH CV VAS DIALYSIS ARTERIAL ANASTOMOSIS EDV: 217 CM/SEC
BH CV VAS DIALYSIS ARTERIAL ANASTOMOSIS PSV: 497 CM/SEC
BH CV VAS DIALYSIS CONDUIT DIST DEPTH: 0.7 CM
BH CV VAS DIALYSIS CONDUIT DIST DIAMETER: 0.3 CM
BH CV VAS DIALYSIS CONDUIT DIST EDV: 60 CM/SEC
BH CV VAS DIALYSIS CONDUIT DIST FLOW VOL: 146 ML/MIN
BH CV VAS DIALYSIS CONDUIT DIST PSV: 100 CM/SEC
BH CV VAS DIALYSIS CONDUIT MID DEPTH: 0.2 CM
BH CV VAS DIALYSIS CONDUIT MID DIAMETER: 0.5 CM
BH CV VAS DIALYSIS CONDUIT MID EDV: 13 CM/SEC
BH CV VAS DIALYSIS CONDUIT MID PSV: 30 CM/SEC
BH CV VAS DIALYSIS CONDUIT MID/DIST DEPTH: 0.3 CM
BH CV VAS DIALYSIS CONDUIT MID/DIST DIAMETER: 0.3 CM
BH CV VAS DIALYSIS CONDUIT MID/DIST EDV: 36 CM/SEC
BH CV VAS DIALYSIS CONDUIT MID/DIST PSV: 62 CM/SEC
BH CV VAS DIALYSIS CONDUIT PROX DEPTH: 0.2 CM
BH CV VAS DIALYSIS CONDUIT PROX DIAMETER: 0.2 CM
BH CV VAS DIALYSIS CONDUIT PROX EDV: 289 CM/SEC
BH CV VAS DIALYSIS CONDUIT PROX PSV: 560 CM/SEC
BH CV VAS DIALYSIS CONDUIT PROX/MID DEPTH: 0.5 CM
BH CV VAS DIALYSIS CONDUIT PROX/MID DIAMETER: 0.6 CM
BH CV VAS DIALYSIS CONDUIT PROX/MID EDV: 53 CM/SEC
BH CV VAS DIALYSIS CONDUIT PROX/MID FLOW VOL: 422 ML/MIN
BH CV VAS DIALYSIS CONDUIT PROX/MID PSV: 89 CM/SEC
BH CV VAS DIALYSIS PRE-INFLOW RADIAL DIAMETER: 0.4 CM
BH CV VAS DIALYSIS PRE-INFLOW RADIAL EDV: 56 CM/SEC
BH CV VAS DIALYSIS PRE-INFLOW RADIAL FLOW VOL: 161 ML/MIN
BH CV VAS DIALYSIS PRE-INFLOW RADIAL PSV: 159 CM/SEC
BH CV VAS DIALYSIS VENOUS OUTFLOW CEPHALIC ARCH DIAMETE: 0.4 CM
BH CV VAS DIALYSIS VENOUS OUTFLOW CEPHALIC ARCH EDV: 43 CM/SEC
BH CV VAS DIALYSIS VENOUS OUTFLOW CEPHALIC ARCH PSV: 193 CM/SEC
BH CV VAS DIALYSIS VENOUS OUTFLOW CEPHALIC VEIN DIAMETE: 0.4 CM
BH CV VAS DIALYSIS VENOUS OUTFLOW CEPHALIC VEIN EDV: 42 CM/SEC
BH CV VAS DIALYSIS VENOUS OUTFLOW CEPHALIC VEIN PSV: 69 CM/SEC
BH CV VAS DIALYSIS VENOUS OUTFLOW SUBCL-CEPHALIC JX DIAMETER: 0.3 CM
BH CV VAS DIALYSIS VENOUS OUTFLOW SUBCL-CEPHALIC JX EDV: 62 CM/SEC
BH CV VAS DIALYSIS VENOUS OUTFLOW SUBCL-CEPHALIC JX PSV: 100 CM/SEC
BH CV VAS DIALYSIS VENOUS OUTFLOW SUBCLAVIAN DIAMETER: 1.2 CM
BH CV VAS SMA AORTA DIAMETER: 2.07 CM
BH CV VAS SMA AORTA PSV: 109 CM/S
BH CV VAS SMA CELIAC ORIGIN PSV: 122 CM/S
BH CV VAS SMA HEPATIC PSV: 76 CM/S
BH CV VAS SMA IMA PSV: -163.4 CM/S
BH CV VAS SMA ORIGIN PSV: 121 CM/S
BH CV VAS SMA SMA DIST EDV: 21 CM/S
BH CV VAS SMA SMA DIST PSV: 127 CM/S
BH CV VAS SMA SMA MID EDV: 23 CM/S
BH CV VAS SMA SMA MID PSV: -208 CM/S
BH CV VAS SMA SMA PROX PSV: 99.3 CM/S

## 2024-11-05 PROCEDURE — 93990 DOPPLER FLOW TESTING: CPT

## 2024-11-05 PROCEDURE — 1160F RVW MEDS BY RX/DR IN RCRD: CPT | Performed by: NURSE PRACTITIONER

## 2024-11-05 PROCEDURE — G2211 COMPLEX E/M VISIT ADD ON: HCPCS | Performed by: NURSE PRACTITIONER

## 2024-11-05 PROCEDURE — 99214 OFFICE O/P EST MOD 30 MIN: CPT | Performed by: NURSE PRACTITIONER

## 2024-11-05 PROCEDURE — 3078F DIAST BP <80 MM HG: CPT | Performed by: NURSE PRACTITIONER

## 2024-11-05 PROCEDURE — 93975 VASCULAR STUDY: CPT

## 2024-11-05 PROCEDURE — 1159F MED LIST DOCD IN RCRD: CPT | Performed by: NURSE PRACTITIONER

## 2024-11-05 PROCEDURE — 3075F SYST BP GE 130 - 139MM HG: CPT | Performed by: NURSE PRACTITIONER

## 2024-11-05 NOTE — PROGRESS NOTES
Chief Complaint  Hemodialysis Access    Subjective        Donald Johnson presents to [facility] VASCULAR SURGERY  HPI   Donald Johnson is a 74 y.o. male that has been followed in our office for hemodialysis access. He has the following access in place: Radiocephalic AV fistula.  His    last intervention was 4/9/2024, though no intervention was warranted.  He returns today in follow up along with an AV duplex. He denies any issues with his access including difficulty with cannulation, prolonged bleeding, decreased clearances, or flow rates.  Reviewing his medical records, he has a known splenic artery aneurysm.  Last image that I can review is from 2022 where this showed a partially calcified 1.5 cm splenic artery aneurysm.  We did try to do a splenic artery duplex today, though we are unable to visualize his spleen.    Review of Systems   Constitutional:  Negative for fever.   Eyes:  Negative for visual disturbance.   Cardiovascular:  Negative for leg swelling.   Gastrointestinal:  Negative for abdominal pain.   Musculoskeletal:  Negative for back pain.   Skin:  Negative for color change, pallor and wound.   Neurological:  Negative for dizziness, facial asymmetry, speech difficulty and weakness.        Donald Johnson  reports that he has never smoked. He has been exposed to tobacco smoke. He has never used smokeless tobacco..        Objective   Vital Signs:  Vitals:    11/05/24 1159   BP: 131/57   Pulse: 67        Body mass index is 27.05 kg/m².           Physical Exam  Vitals reviewed.   Constitutional:       Appearance: Normal appearance.   HENT:      Head: Normocephalic.   Cardiovascular:      Rate and Rhythm: Normal rate and regular rhythm.      Pulses: Normal pulses.           Dorsalis pedis pulses are 3+ on the right side and 3+ on the left side.        Posterior tibial pulses are 3+ on the right side and 3+ on the left side.   Pulmonary:      Effort: Pulmonary effort is normal.    Skin:     General: Skin is warm.   Neurological:      General: No focal deficit present.      Mental Status: He is alert and oriented to person, place, and time.   Psychiatric:         Mood and Affect: Mood normal.          Result Review :  Duplex Mesenteric Complete CAR (11/05/2024 11:20)     Duplex from today:Duplex Hemodialysis Access CAR (11/05/2024 11:45)     CT ABDOMEN PELVIS WO CONTRAST (10/25/2022 10:21)                  Assessment and Plan     Diagnoses and all orders for this visit:    1. ESRD (end stage renal disease) (Primary)  -     Dialysis Circuit Angiography (Fistulogram); Future            Presents today for follow-up of his hemodialysis access and a known splenic artery aneurysm.  Unfortunately, we are unable to visualize the spleen today on duplex imaging.  This was last imaged in 2022 where it showed a 1.5 cm partially calcified splenic artery aneurysm.  We did discuss pursuing a CT without contrast of the abdomen and pelvis, though we will wait at this time.  His AV fistula duplex shows inadequate volume flows therefore I recommended that he proceed with a fistulogram.  We discussed the procedure including the risk and the benefits and he is agreeable to proceed.  We will get this scheduled at his earliest convenience with Dr. Shepherd.       Follow Up     Return for fistulogram with [facility] on Friday .  Patient was given instructions and counseling regarding his condition or for health maintenance advice. Please see specific information pulled into the AVS if appropriate.     STEVEN Dee

## 2024-11-21 DIAGNOSIS — N18.6 ESRD (END STAGE RENAL DISEASE): Primary | ICD-10-CM

## 2025-03-20 ENCOUNTER — HOSPITAL ENCOUNTER (OUTPATIENT)
Facility: HOSPITAL | Age: 75
Discharge: HOME | End: 2025-03-20
Admitting: SURGERY
Payer: MEDICARE

## 2025-03-20 DIAGNOSIS — N18.6 ESRD (END STAGE RENAL DISEASE): ICD-10-CM

## 2025-03-20 LAB
BH CV VAS DIALYSIS ARTERIAL ANASTOMOSIS DIAMETER: 0.3 CM
BH CV VAS DIALYSIS ARTERIAL ANASTOMOSIS EDV: 171 CM/SEC
BH CV VAS DIALYSIS ARTERIAL ANASTOMOSIS PSV: 296 CM/SEC
BH CV VAS DIALYSIS CONDUIT DIST DEPTH: 0.2 CM
BH CV VAS DIALYSIS CONDUIT DIST DIAMETER: 0.5 CM
BH CV VAS DIALYSIS CONDUIT DIST EDV: 54 CM/SEC
BH CV VAS DIALYSIS CONDUIT DIST FLOW VOL: 440 ML/MIN
BH CV VAS DIALYSIS CONDUIT DIST PSV: 93 CM/SEC
BH CV VAS DIALYSIS CONDUIT MID DEPTH: 0.2 CM
BH CV VAS DIALYSIS CONDUIT MID DIAMETER: 0.7 CM
BH CV VAS DIALYSIS CONDUIT MID EDV: 39 CM/SEC
BH CV VAS DIALYSIS CONDUIT MID PSV: 70 CM/SEC
BH CV VAS DIALYSIS CONDUIT MID/DIST DEPTH: 0.4 CM
BH CV VAS DIALYSIS CONDUIT MID/DIST DIAMETER: 0.6 CM
BH CV VAS DIALYSIS CONDUIT MID/DIST EDV: 54 CM/SEC
BH CV VAS DIALYSIS CONDUIT MID/DIST FLOW VOL: 479 ML/MIN
BH CV VAS DIALYSIS CONDUIT MID/DIST PSV: 93 CM/SEC
BH CV VAS DIALYSIS CONDUIT PROX DEPTH: 0.3 CM
BH CV VAS DIALYSIS CONDUIT PROX DIAMETER: 0.2 CM
BH CV VAS DIALYSIS CONDUIT PROX EDV: 468 CM/SEC
BH CV VAS DIALYSIS CONDUIT PROX PSV: 793 CM/SEC
BH CV VAS DIALYSIS CONDUIT PROX/MID DEPTH: 0.3 CM
BH CV VAS DIALYSIS CONDUIT PROX/MID DIAMETER: 0.2 CM
BH CV VAS DIALYSIS CONDUIT PROX/MID EDV: 128 CM/SEC
BH CV VAS DIALYSIS CONDUIT PROX/MID PSV: 258 CM/SEC
BH CV VAS DIALYSIS PRE-INFLOW RADIAL DIAMETER: 0.4 CM
BH CV VAS DIALYSIS PRE-INFLOW RADIAL EDV: 71 CM/SEC
BH CV VAS DIALYSIS PRE-INFLOW RADIAL FLOW VOL: 400 ML/MIN
BH CV VAS DIALYSIS PRE-INFLOW RADIAL PSV: 149 CM/SEC
BH CV VAS DIALYSIS VENOUS OUTFLOW CEPHALIC ARCH DIAMETE: 0.6 CM
BH CV VAS DIALYSIS VENOUS OUTFLOW CEPHALIC ARCH EDV: 95 CM/SEC
BH CV VAS DIALYSIS VENOUS OUTFLOW CEPHALIC ARCH PSV: 176 CM/SEC
BH CV VAS DIALYSIS VENOUS OUTFLOW CEPHALIC VEIN DIAMETE: 0.4 CM
BH CV VAS DIALYSIS VENOUS OUTFLOW CEPHALIC VEIN EDV: 17 CM/SEC
BH CV VAS DIALYSIS VENOUS OUTFLOW CEPHALIC VEIN PSV: 41 CM/SEC
BH CV VAS DIALYSIS VENOUS OUTFLOW SUBCL-CEPHALIC JX DIAMETER: 0.7 CM
BH CV VAS DIALYSIS VENOUS OUTFLOW SUBCL-CEPHALIC JX EDV: 47 CM/SEC
BH CV VAS DIALYSIS VENOUS OUTFLOW SUBCL-CEPHALIC JX PSV: 116 CM/SEC
BH CV VAS DIALYSIS VENOUS OUTFLOW SUBCLAVIAN DIAMETER: 1.3 CM
BH CV VAS DIALYSIS VENOUS OUTFLOW SUBCLAVIAN EDV: 73 CM/SEC
BH CV VAS DIALYSIS VENOUS OUTFLOW SUBCLAVIAN PSV: 151 CM/SEC

## 2025-03-20 PROCEDURE — 93990 DOPPLER FLOW TESTING: CPT

## 2025-03-27 ENCOUNTER — OFFICE VISIT (OUTPATIENT)
Age: 75
End: 2025-03-27
Payer: MEDICARE

## 2025-03-27 ENCOUNTER — TELEPHONE (OUTPATIENT)
Age: 75
End: 2025-03-27

## 2025-03-27 VITALS
WEIGHT: 201.1 LBS | BODY MASS INDEX: 26.65 KG/M2 | SYSTOLIC BLOOD PRESSURE: 120 MMHG | HEART RATE: 57 BPM | HEIGHT: 73 IN | DIASTOLIC BLOOD PRESSURE: 70 MMHG

## 2025-03-27 DIAGNOSIS — N18.6 ESRD (END STAGE RENAL DISEASE): Primary | ICD-10-CM

## 2025-03-27 DIAGNOSIS — I72.8 ANEURYSM OF SPLENIC ARTERY: ICD-10-CM

## 2025-03-27 NOTE — PROGRESS NOTES
Chief Complaint  ESRD (end stage renal disease)    Subjective        Donald Johnson presents to [facility] VASCULAR SURGERY  HPI   Donald Johnson is a 74 y.o. male that has been followed in our office for hemodialysis access. He has the following access in place: Radiocephalic AV fistula.  His    last intervention was 11/21/2024, where he had a fistulogram with angioplasty.  He returns today in follow up along with an AV duplex. He denies any issues with his access including difficulty with cannulation, prolonged bleeding, decreased clearances, or flow rates.  Reviewing his medical records, he has a known splenic artery aneurysm.  Last image that I can review is from 2022 where this showed a partially calcified 1.5 cm splenic artery aneurysm.  We did try to do a splenic artery duplex at the last visit, though we were unable to visualize his spleen.    Review of Systems   Constitutional:  Negative for fever.   Eyes:  Negative for visual disturbance.   Cardiovascular:  Negative for leg swelling.   Gastrointestinal:  Negative for abdominal pain.   Musculoskeletal:  Negative for back pain.   Skin:  Negative for color change, pallor and wound.   Neurological:  Negative for dizziness, facial asymmetry, speech difficulty and weakness.        Donald Johnson  reports that he has never smoked. He has been exposed to tobacco smoke. He has never used smokeless tobacco..        Objective   Vital Signs:  Vitals:    03/27/25 0838   BP: 120/70   Pulse: 57          Body mass index is 26.53 kg/m².           Physical Exam  Vitals reviewed.   Constitutional:       Appearance: Normal appearance.   HENT:      Head: Normocephalic.   Cardiovascular:      Rate and Rhythm: Normal rate and regular rhythm.      Pulses: Normal pulses.           Dorsalis pedis pulses are 3+ on the right side and 3+ on the left side.        Posterior tibial pulses are 3+ on the right side and 3+ on the left side.   Pulmonary:       Effort: Pulmonary effort is normal.   Skin:     General: Skin is warm.   Neurological:      General: No focal deficit present.      Mental Status: He is alert and oriented to person, place, and time.   Psychiatric:         Mood and Affect: Mood normal.          Result Review :  Duplex Mesenteric Complete CAR (11/05/2024 11:20)     Duplex from today:Duplex Hemodialysis Access CAR (03/20/2025 09:07)     CT ABDOMEN PELVIS WO CONTRAST (10/25/2022 10:21)                  Assessment and Plan     Diagnoses and all orders for this visit:    1. ESRD (end stage renal disease) (Primary)  -     Duplex Hemodialysis Access CAR; Future    2. Aneurysm of splenic artery  -     CT Abdomen Pelvis Without Contrast; Future              Presents today for follow-up of his hemodialysis access. His AV fistula duplex  shows mild improvement in his volume flows after his fistulogram, though he continues to have a stenosis seen at the proximal segment.  He is not have any issues with his access, therefore I recommended that we can continue to closely follow him.  If he has any issues, he will contact our office and we will set up a fistulogram.  He will return in 3 months along with AV fistula duplex.  We will also obtain a noncontrasted CT of the abdomen pelvis to assess for a splenic artery aneurysm were unable to see this on duplex imaging.            Follow Up     Return in about 3 months (around 6/27/2025) for AVF/AVG.  Patient was given instructions and counseling regarding his condition or for health maintenance advice. Please see specific information pulled into the AVS if appropriate.     STEVEN Dee

## 2025-03-27 NOTE — TELEPHONE ENCOUNTER
Hub staff attempted to follow warm transfer process and was unsuccessful     Caller: Donald Johnson    Relationship to patient: Self    Best call back number: [phone]      Patient is needing: PT NEED TO [place] APPT TO A TUESDAY OR A THURSDAY EARLY MORNING-PT HAS DIALYSIS ON M W AND F

## 2025-04-03 ENCOUNTER — HOSPITAL ENCOUNTER (OUTPATIENT)
Dept: CT IMAGING | Facility: HOSPITAL | Age: 75
Discharge: HOME | End: 2025-04-03
Admitting: NURSE PRACTITIONER
Payer: MEDICARE

## 2025-04-03 DIAGNOSIS — I72.8 ANEURYSM OF SPLENIC ARTERY: ICD-10-CM

## 2025-04-03 PROCEDURE — 74176 CT ABD & PELVIS W/O CONTRAST: CPT

## 2025-04-14 ENCOUNTER — RESULTS FOLLOW-UP (OUTPATIENT)
Age: 75
End: 2025-04-14
Payer: MEDICARE

## 2025-04-24 NOTE — PROGRESS NOTES
PCP:  Natanael Marshall MD                                                                         ROOM:____________    : 1950  AGE: 74 y.o.    ALLERGIES:Patient has no known allergies.    LAST OV:______________________ LAST EKG:________________ LAST WEIGHT:   Wt Readings from Last 1 Encounters:   25 91.2 kg (201 lb 1.6 oz)        BP Readings from Last 3 Encounters:   25 120/70   24 125/67   24 131/57        WT: ____________  BP: __________ HR ______   02% _______      CHEST PAIN: YES OR NO                                           LIGHTHEADED: YES OR NO    SOA: YES OR NO                    FATIGUE: YES OR NO    PALPITATIONS: YES OR NO                                      EDEMA: YES OR NO    SLEEP APNEA: YES OR NO                                     CPAP     OR    BIPAP                                                  SMOKING:   Social History     Socioeconomic History    Marital status:      Spouse name: Chary    Number of children: 1    Years of education: High school   Tobacco Use    Smoking status: Never     Passive exposure: Yes    Smokeless tobacco: Never    Tobacco comments:     caffeine use - None; Patient does not smoke   Vaping Use    Vaping status: Never Used   Substance and Sexual Activity    Alcohol use: No     Comment: Patient is non drinker    Drug use: Never     Comment: Drug Abuse: No drug abuse    Sexual activity: Defer

## 2025-04-25 ENCOUNTER — OFFICE VISIT (OUTPATIENT)
Dept: CARDIOLOGY | Age: 75
End: 2025-04-25
Payer: MEDICARE

## 2025-04-25 VITALS
HEART RATE: 70 BPM | SYSTOLIC BLOOD PRESSURE: 102 MMHG | WEIGHT: 212.6 LBS | HEIGHT: 73 IN | OXYGEN SATURATION: 96 % | DIASTOLIC BLOOD PRESSURE: 62 MMHG | BODY MASS INDEX: 28.18 KG/M2

## 2025-04-25 DIAGNOSIS — I27.20 PULMONARY HYPERTENSION: ICD-10-CM

## 2025-04-25 DIAGNOSIS — E78.00 HYPERCHOLESTEROLEMIA: ICD-10-CM

## 2025-04-25 DIAGNOSIS — I48.92 ATRIAL FLUTTER WITH CONTROLLED RESPONSE: Primary | ICD-10-CM

## 2025-04-25 DIAGNOSIS — R53.1 GENERALIZED WEAKNESS: ICD-10-CM

## 2025-04-25 DIAGNOSIS — I95.2 DRUG-INDUCED HYPOTENSION: ICD-10-CM

## 2025-04-25 DIAGNOSIS — I10 ESSENTIAL HYPERTENSION: ICD-10-CM

## 2025-04-25 RX ORDER — NIFEDIPINE 30 MG/1
60 TABLET, EXTENDED RELEASE ORAL EVERY EVENING
Qty: 120 TABLET | Refills: 3 | Status: SHIPPED | OUTPATIENT
Start: 2025-04-25 | End: 2025-04-25

## 2025-04-25 RX ORDER — NIFEDIPINE 30 MG
60 TABLET, EXTENDED RELEASE ORAL NIGHTLY
Qty: 120 TABLET | Refills: 3 | Status: SHIPPED | OUTPATIENT
Start: 2025-04-25 | End: 2025-04-25 | Stop reason: SDUPTHER

## 2025-04-25 RX ORDER — NIFEDIPINE 30 MG
60 TABLET, EXTENDED RELEASE ORAL NIGHTLY
Qty: 120 TABLET | Refills: 3 | Status: SHIPPED | OUTPATIENT
Start: 2025-04-25

## 2025-04-25 NOTE — PROGRESS NOTES
PATIENTINFORMATION    Date of Office Visit: 2025  Encounter Provider: Ulices Puga MD  Place of Service: [facility] CARDIOLOGY  Patient Name: Donald Johnson  : 1950    Subjective:     Encounter Date:2025      Patient ID: Donald Johnson is a 74 y.o. male.    Chief Complaint   Patient presents with    Atrial Flutter       HPI  Mr. Johnson is a pleasant 74 years old gentleman who came to cardiology clinic for follow-up visit.  He continues to be fairly active and also denies stationary bike for about 30 minutes off dialysis days.  He reports getting more fatigued especially on dialysis days and experiencing more frequent low blood pressure readings with current blood pressure regimen.  No recurrence of fainting.  No chest pain, orthopnea, PND, palpitations or extremity swelling.  No recent ER visits      ROS  All systems reviewed and negative except as noted in HPI.    Past Medical History:   Diagnosis Date    Anemia     Aneurysm of other specified arteries 2016    Anticoagulated by anticoagulation treatment     eliquis    Arthritis     At risk for obstructive sleep apnea     stop bang #5    Atrial flutter     Cancer 2017    Left cheek, nonmelanoma    Chronic kidney disease     Coarctation of aorta     very mild, likely not hemodynamically significant    DDD (degenerative disc disease), lumbosacral     Dependence on renal dialysis 2021    Diabetes mellitus     Type 2    End stage renal disease 2020    H/O Chronic gout     H/O Lung mass 2016    History of cardiac cath     2013 with luminal irregularities, normal EF, normal LVEDP    Hyperlipidemia     Hypertension     Monoclonal gammopathy 2018    Obesity     PAD (peripheral artery disease)     PONV (postoperative nausea and vomiting)     Right kidney mass     Splenic artery aneurysm     Type 2 diabetes mellitus with hyperglycemia 2016    with PVD       Past Surgical History:   Procedure  Laterality Date    ACHILLES TENDON SURGERY Right 1991    ARTERIOVENOUS FISTULA/SHUNT SURGERY Left 03/04/2021    Procedure: LEFT ARM radialcephalic FISTULA PLACEMENT;  Surgeon: Jm Huynh MD;  Location: [facility] MAIN OR;  Service: Vascular;  Laterality: Left;    CARDIAC CATHETERIZATION      CARDIAC CATHETERIZATION  07/09/2013    CHOLECYSTECTOMY      COLONOSCOPY      EPIDURAL BLOCK      MOHS SURGERY Left 07/20/2017    Cheek    NEPHRECTOMY PARTIAL Right 01/03/2020    Procedure: RIGHT LAPAROSCOPIC PARTIAL  NEPHRECTOMY;  Surgeon: Ean Chavez MD;  Location: [facility] OR;  Service: Urology    SKIN CANCER EXCISION  2014    Top of head x2    TONSILLECTOMY         Social History     Socioeconomic History    Marital status:      Spouse name: Chary    Number of children: 1    Years of education: High school   Tobacco Use    Smoking status: Never     Passive exposure: Yes    Smokeless tobacco: Never    Tobacco comments:     caffeine use - None; Patient does not smoke   Vaping Use    Vaping status: Never Used   Substance and Sexual Activity    Alcohol use: No     Comment: Patient is non drinker    Drug use: Never     Comment: Drug Abuse: No drug abuse    Sexual activity: Defer       Family History   Problem Relation Age of Onset    Hypertension Mother     Diabetes Mother     Heart disease Mother     Coronary artery disease Father         ARTERIOSCLEROSIS NONPREMATURE    Heart disease Father     Hypertension Father     Diabetes Father     Gallbladder disease Father     Heart attack Father     Diabetes Sister     Heart disease Sister     Hypertension Sister     Diabetes Brother     Gallbladder disease Brother     Heart disease Brother     Hypertension Brother     Aneurysm Brother     Heart disease Other     Stroke Other     Aneurysm Other     Malig Hyperthermia Neg Hx          Procedures       Objective:     /62 (BP Location: Left arm, Patient Position: Sitting, Cuff Size: Adult)   Pulse 70   Ht  "185.4 cm (73\")   Wt 96.4 kg (212 lb 9.6 oz)   SpO2 96%   BMI 28.05 kg/m²  Body mass index is 28.05 kg/m².     Constitutional:       General: Not in acute distress.     Appearance: Well-developed. Not diaphoretic.   Eyes:      Pupils: Pupils are equal, round, and reactive to light.   HENT:      Head: Normocephalic and atraumatic.   Neck:      Thyroid: No thyromegaly.   Pulmonary:      Effort: Pulmonary effort is normal. No respiratory distress.      Breath sounds: Normal breath sounds. No wheezing. No rales.   Chest:      Chest wall: Not tender to palpatation.   Cardiovascular:      Normal rate. Regular rhythm.      Murmurs: There is a harsh midsystolic murmur at the URSB, radiating to the neck.      No gallop.    Pulses:     Intact distal pulses.   Edema:     Peripheral edema absent.   Abdominal:      General: Bowel sounds are normal. There is no distension.      Palpations: Abdomen is soft.      Tenderness: There is no guarding.   Musculoskeletal: Normal range of motion.         General: No deformity.      Cervical back: Normal range of motion and neck supple. Skin:     General: Skin is warm and dry.      Findings: No rash.   Neurological:      Mental Status: Alert and oriented to person, place, and time.      Cranial Nerves: No cranial nerve deficit.      Deep Tendon Reflexes: Reflexes are normal and symmetric.   Psychiatric:         Judgment: Judgment normal.         Review Of Data: I have reviewed pertinent recent labs, images and documents and pertinent findings included in HPI or assessment below.      Assessment/Plan:      Persistent atrial flutter that is rate controlled without AV kiley blockers. History of bradycardia with beta-blocker and rate controlled off AV kiley blocker. Underlying LBBB.Off anticoagulation because of MRI showing amyloid angiopathy and increased risk of bleeding-His neurologist deemed he is not candidate for chronic AC. Echocardiogram in December 2022: Moderate LVH but preserved " left ventricular function, severely dilated left atrial cavity, mild pulmonary hypertension, mild MR/TR. Currently on aspirin 81 mg p.o. daily.  Exertional shortness of breath: Normal 6-minute stress echo in September 2024.  Hypertension-with intermittent hypotensive episodes  End-stage renal disease on hemodialysis using left forearm AV fistula. Transplant declined by [facility] , [facility] and [facility].  Gets dialysis on Monday Wednesday and Friday.  Peripheral arterial disease-continue high intensity statin  Hypercholesterolemia-on high intensity statin-lipid panel at goal  Suspected cerebral amyloid angiopathy-increased risk of bleeding with anticoagulation.  Optimize blood pressure   Episode of syncope during dialysis in January 2024-Rolison discontinued since then.  No recurrence.  Mild aortic valve stenosis     I suspect fatigue and exertional shortness of breath  of multifactorial etiology with lower blood pressure contributing.  I have decreased nifedipine to 60 mg once daily and to take only 30 mg on days of dialysis.  Further adjustment after reviewing blood pressure readings in the next 2 weeks.  He will continue to be active and walk regularly.  Otherwise continue current medications.  Follow-up with in 6 months or sooner with concerning symptoms.  Diagnosis and plan of care discussed with patient and verbalized understanding.            Your medication list            Accurate as of April 25, 2025  4:25 PM. If you have any questions, ask your nurse or doctor.                CHANGE how you take these medications        Instructions Last Dose Given Next Dose Due   NIFEdipine CC 30 MG 24 hr tablet  Commonly known as: ADALAT CC  What changed: medication strength  Changed by: Ulices Puga      Take 2 tablets by mouth Every Night.              CONTINUE taking these medications        Instructions Last Dose Given Next Dose Due   albuterol sulfate  (90 Base) MCG/ACT  inhaler  Commonly known as: PROVENTIL HFA;VENTOLIN HFA;PROAIR HFA      Inhale 2 puffs Every 4 (Four) Hours As Needed for Wheezing or Shortness of Air.       allopurinol 300 MG tablet  Commonly known as: ZYLOPRIM      Take 0.5 tablets by mouth Daily.       aspirin 81 MG EC tablet      Take 1 tablet by mouth Daily.       atorvastatin 40 MG tablet  Commonly known as: LIPITOR      Take 1 tablet by mouth Every Morning.       freestyle lancets      INJECT 1 INTO THE SKIN EVERY DAY       FREESTYLE LITE test strip  Generic drug: glucose blood      USE ONE STRIP AS DIRECTED ONCE DAILY       glimepiride 1 MG tablet  Commonly known as: AMARYL      Take 1 tablet by mouth Every Morning Before Breakfast.       promethazine-dextromethorphan 6.25-15 MG/5ML syrup  Commonly known as: PROMETHAZINE-DM      Take 5 mL by mouth 4 (Four) Times a Day As Needed for Cough.       ROXI-YULISSA PO      Take 1 tablet by mouth Daily.       sennosides-docusate 8.6-50 MG per tablet  Commonly known as: PERICOLACE      Take 2 tablets by mouth Every Night.       SENSIPAR PO      Take 60 mg by mouth.       sevelamer 800 MG tablet  Commonly known as: RENVELA      TAKE 4 TABLETS BY MOUTH THREE TIMES DAILY WITH MEALS AND 2 TABLETS  WITH A SNACK       tamsulosin 0.4 MG capsule 24 hr capsule  Commonly known as: FLOMAX      Take 2 capsules by mouth Every Night.       Vitamin D3 10 MCG (400 UNIT) chewable tablet      Chew.              STOP taking these medications      hydrALAZINE 25 MG tablet  Commonly known as: APRESOLINE  Stopped by: Ulices Puga                  Where to Get Your Medications        You can get these medications from any pharmacy    Bring a paper prescription for each of these medications  NIFEdipine CC 30 MG 24 hr tablet             Ulices Puga MD  04/25/25  16:25 EDT

## 2025-04-28 ENCOUNTER — TELEPHONE (OUTPATIENT)
Dept: CARDIOLOGY | Age: 75
End: 2025-04-28
Payer: MEDICARE

## 2025-04-28 NOTE — TELEPHONE ENCOUNTER
Pt called and states that pt is taking already taking 30 mg of nifedipine. He thought he was taking 60 mg of nifedipine.    Before visit he was on nifedipine 30 mg BID and 1 tab on his dialysis days.      Pt was last seen 4/25/2025  Assessment/Plan:      Persistent atrial flutter that is rate controlled without AV kiley blockers. History of bradycardia with beta-blocker and rate controlled off AV kiley blocker. Underlying LBBB.Off anticoagulation because of MRI showing amyloid angiopathy and increased risk of bleeding-His neurologist deemed he is not candidate for chronic AC. Echocardiogram in December 2022: Moderate LVH but preserved left ventricular function, severely dilated left atrial cavity, mild pulmonary hypertension, mild MR/TR. Currently on aspirin 81 mg p.o. daily.  Exertional shortness of breath: Normal 6-minute stress echo in September 2024.  Hypertension-with intermittent hypotensive episodes  End-stage renal disease on hemodialysis using left forearm AV fistula. Transplant declined by [facility] , [facility] and [place].  Gets dialysis on Monday Wednesday and Friday.  Peripheral arterial disease-continue high intensity statin  Hypercholesterolemia-on high intensity statin-lipid panel at goal  Suspected cerebral amyloid angiopathy-increased risk of bleeding with anticoagulation.  Optimize blood pressure   Episode of syncope during dialysis in January 2024-Rolison discontinued since then.  No recurrence.  Mild aortic valve stenosis     I suspect fatigue and exertional shortness of breath  of multifactorial etiology with lower blood pressure contributing.  I have decreased nifedipine to 60 mg once daily and to take only 30 mg on days of dialysis.  Further adjustment after reviewing blood pressure readings in the next 2 weeks.  He will continue to be active and walk regularly.  Otherwise continue current medications.  Follow-up with in 6 months or sooner with concerning  symptoms.  Diagnosis and plan of care discussed with patient and verbalized understanding.

## 2025-05-05 NOTE — TELEPHONE ENCOUNTER
Called and informed pt. Pt states that he stopped his nifedipine 30 mg since the last time he saw you. Pt denies any symptoms and his BP g=has been ok since.      Thanks  Jacqueline TURNER

## 2025-07-01 ENCOUNTER — HOSPITAL ENCOUNTER (OUTPATIENT)
Facility: HOSPITAL | Age: 75
Discharge: HOME | End: 2025-07-01
Admitting: NURSE PRACTITIONER
Payer: MEDICARE

## 2025-07-01 ENCOUNTER — OFFICE VISIT (OUTPATIENT)
Age: 75
End: 2025-07-01
Payer: MEDICARE

## 2025-07-01 VITALS
SYSTOLIC BLOOD PRESSURE: 118 MMHG | WEIGHT: 212.9 LBS | DIASTOLIC BLOOD PRESSURE: 63 MMHG | BODY MASS INDEX: 28.22 KG/M2 | HEIGHT: 73 IN

## 2025-07-01 DIAGNOSIS — N28.9 RENAL LESION: ICD-10-CM

## 2025-07-01 DIAGNOSIS — N18.6 ESRD (END STAGE RENAL DISEASE): ICD-10-CM

## 2025-07-01 DIAGNOSIS — N18.6 ESRD (END STAGE RENAL DISEASE): Primary | ICD-10-CM

## 2025-07-01 LAB
BH CV VAS DIALYSIS ARTERIAL ANASTOMOSIS DIAMETER: 0.4 CM
BH CV VAS DIALYSIS ARTERIAL ANASTOMOSIS EDV: 40.6 CM/SEC
BH CV VAS DIALYSIS ARTERIAL ANASTOMOSIS PSV: 172.3 CM/SEC
BH CV VAS DIALYSIS CONDUIT DIST DEPTH: 0.5 CM
BH CV VAS DIALYSIS CONDUIT DIST DIAMETER: 0.5 CM
BH CV VAS DIALYSIS CONDUIT DIST EDV: 42.7 CM/SEC
BH CV VAS DIALYSIS CONDUIT DIST FLOW VOL: 357 ML/MIN
BH CV VAS DIALYSIS CONDUIT DIST PSV: 70.8 CM/SEC
BH CV VAS DIALYSIS CONDUIT MID DEPTH: 0.3 CM
BH CV VAS DIALYSIS CONDUIT MID DIAMETER: 0.3 CM
BH CV VAS DIALYSIS CONDUIT MID EDV: 98.8 CM/SEC
BH CV VAS DIALYSIS CONDUIT MID PSV: 136.9 CM/SEC
BH CV VAS DIALYSIS CONDUIT MID/DIST DEPTH: 0.3 CM
BH CV VAS DIALYSIS CONDUIT MID/DIST DIAMETER: 0.4 CM
BH CV VAS DIALYSIS CONDUIT MID/DIST EDV: 59.8 CM/SEC
BH CV VAS DIALYSIS CONDUIT MID/DIST PSV: 90.1 CM/SEC
BH CV VAS DIALYSIS CONDUIT PROX DEPTH: 0.3 CM
BH CV VAS DIALYSIS CONDUIT PROX DIAMETER: 0.1 CM
BH CV VAS DIALYSIS CONDUIT PROX EDV: 415.7 CM/SEC
BH CV VAS DIALYSIS CONDUIT PROX PSV: 773.8 CM/SEC
BH CV VAS DIALYSIS CONDUIT PROX/MID DEPTH: 0.3 CM
BH CV VAS DIALYSIS CONDUIT PROX/MID DIAMETER: 0.5 CM
BH CV VAS DIALYSIS CONDUIT PROX/MID EDV: 78.4 CM/SEC
BH CV VAS DIALYSIS CONDUIT PROX/MID PSV: 159.9 CM/SEC
BH CV VAS DIALYSIS PRE-INFLOW BRACHIAL DIAMETER: 0.3 CM
BH CV VAS DIALYSIS PRE-INFLOW BRACHIAL EDV: 32.9 CM/SEC
BH CV VAS DIALYSIS PRE-INFLOW BRACHIAL FLOW VOL: 192 ML/MIN
BH CV VAS DIALYSIS PRE-INFLOW BRACHIAL PSV: 92.6 CM/SEC
BH CV VAS DIALYSIS VENOUS OUTFLOW CEPHALIC ARCH DIAMETE: 0.5 CM
BH CV VAS DIALYSIS VENOUS OUTFLOW CEPHALIC ARCH EDV: 51.7 CM/SEC
BH CV VAS DIALYSIS VENOUS OUTFLOW CEPHALIC ARCH PSV: 136.4 CM/SEC
BH CV VAS DIALYSIS VENOUS OUTFLOW CEPHALIC VEIN DIAMETE: 0.3 CM
BH CV VAS DIALYSIS VENOUS OUTFLOW CEPHALIC VEIN EDV: 49 CM/SEC
BH CV VAS DIALYSIS VENOUS OUTFLOW CEPHALIC VEIN PSV: 68 CM/SEC
BH CV VAS DIALYSIS VENOUS OUTFLOW SUBCL-CEPHALIC JX DIAMETER: 0.3 CM
BH CV VAS DIALYSIS VENOUS OUTFLOW SUBCL-CEPHALIC JX EDV: 46.5 CM/SEC
BH CV VAS DIALYSIS VENOUS OUTFLOW SUBCL-CEPHALIC JX PSV: 67.8 CM/SEC
BH CV VAS DIALYSIS VENOUS OUTFLOW SUBCLAVIAN DIAMETER: 1.3 CM
BH CV VAS DIALYSIS VENOUS OUTFLOW SUBCLAVIAN EDV: 23 CM/SEC
BH CV VAS DIALYSIS VENOUS OUTFLOW SUBCLAVIAN PSV: 64.8 CM/SEC

## 2025-07-01 PROCEDURE — 93990 DOPPLER FLOW TESTING: CPT

## 2025-07-01 RX ORDER — SUCROFERRIC OXYHYDROXIDE 500 MG/1
TABLET, CHEWABLE ORAL
COMMUNITY
Start: 2025-06-11

## 2025-07-01 NOTE — PROGRESS NOTES
Chief Complaint  ESRD (end stage renal disease)    Subjective        Donald Johnson presents to [facility] VASCULAR SURGERY  HPI   Donald Johnson is a 74 y.o. male that has been followed in our office for hemodialysis access. He has the following access in place: Radiocephalic AV fistula.  His    last intervention was 11/21/2024, where he had a fistulogram with angioplasty.  He returns today in follow up along with an AV duplex. He denies any issues with his access including difficulty with cannulation, prolonged bleeding, decreased clearances, or flow rates.      Reviewing his medical records, he has a known splenic artery aneurysm.  Last image that I can review is from 2022 where this showed a partially calcified 1.5 cm splenic artery aneurysm.  We did try to do a splenic artery duplex at the last visit, though we were unable to visualize his spleen.  He had a CT scan done on 4/3/2025 which showed a 1.8 cm calcified splenic artery aneurysm that was stable from his 2016 imaging.  He was also found to have a complex irregular 2 cm lesion arising from the lower right pole of the kidney of uncertain etiology.  This could be related to an old hemorrhage and/or scar but was indeterminate.  Further evaluation was suggested in 4 to 6 months.  [facility] and has had a biopsy of this area in the past.  He also had bilateral inguinal hernias and prostate gland enlargement.    Review of Systems   Constitutional:  Negative for fever.   Eyes:  Negative for visual disturbance.   Cardiovascular:  Negative for leg swelling.   Gastrointestinal:  Negative for abdominal pain.   Musculoskeletal:  Negative for back pain.   Skin:  Negative for color change, pallor and wound.   Neurological:  Negative for dizziness, facial asymmetry, speech difficulty and weakness.        Donald Johnson  reports that he has never smoked. He has never been exposed to tobacco  smoke. He has never used smokeless tobacco..        Objective   Vital Signs:  Vitals:    07/01/25 0819   BP: 118/63            Body mass index is 28.09 kg/m².           Physical Exam  Vitals reviewed.   Constitutional:       Appearance: Normal appearance.   HENT:      Head: Normocephalic.   Cardiovascular:      Rate and Rhythm: Normal rate and regular rhythm.      Pulses:           Dorsalis pedis pulses are 3+ on the right side and 3+ on the left side.        Posterior tibial pulses are 3+ on the right side and 3+ on the left side.   Pulmonary:      Effort: Pulmonary effort is normal.   Skin:     General: Skin is warm.   Neurological:      General: No focal deficit present.      Mental Status: He is alert and oriented to person, place, and time.   Psychiatric:         Mood and Affect: Mood normal.          Result Review :  Duplex Mesenteric Complete CAR (11/05/2024 11:20)     Duplex from today:Duplex Hemodialysis Access CAR (07/01/2025 08:14)       CT Abdomen Pelvis Without Contrast (04/03/2025 09:44)            Assessment and Plan     Diagnoses and all orders for this visit:    1. ESRD (end stage renal disease) (Primary)  -     Dialysis Circuit Angiography (Fistulogram); Future    2. Renal lesion  -     CT Abdomen Pelvis Without Contrast; Future                Presents today for follow-up of his hemodialysis access.  Today, he has a severe proximal stenosis and inadequate volume flows.  I recommended a fistulogram at his earliest convenience.  We discussed the risk and benefits and agreeable to proceed.  In regards to his renal lesion, I will order a noncontrasted CT in 3 months.  I will then defer further evaluation to urology.            Follow Up     Return for fistulogram T, Th.  Patient was given instructions and counseling regarding his condition or for health maintenance advice. Please see specific information pulled into the AVS if appropriate.     Nancy Romero, STEVEN

## 2025-08-22 ENCOUNTER — TRANSCRIBE ORDERS (OUTPATIENT)
Dept: ADMINISTRATIVE | Facility: HOSPITAL | Age: 75
End: 2025-08-22
Payer: MEDICARE

## 2025-08-22 ENCOUNTER — HOSPITAL ENCOUNTER (OUTPATIENT)
Dept: GENERAL RADIOLOGY | Facility: HOSPITAL | Age: 75
Discharge: HOME | End: 2025-08-22
Payer: MEDICARE

## 2025-08-22 DIAGNOSIS — R06.00 DYSPNEA, UNSPECIFIED TYPE: Primary | ICD-10-CM

## 2025-08-22 DIAGNOSIS — R06.00 DYSPNEA, UNSPECIFIED TYPE: ICD-10-CM

## 2025-08-22 PROCEDURE — 71046 X-RAY EXAM CHEST 2 VIEWS: CPT

## 2025-08-26 ENCOUNTER — HOSPITAL ENCOUNTER (OUTPATIENT)
Dept: CARDIOLOGY | Facility: HOSPITAL | Age: 75
Discharge: HOME | End: 2025-08-26
Payer: MEDICARE

## 2025-08-26 ENCOUNTER — OFFICE VISIT (OUTPATIENT)
Dept: CARDIOLOGY | Age: 75
End: 2025-08-26
Payer: MEDICARE

## 2025-08-26 ENCOUNTER — RESULTS FOLLOW-UP (OUTPATIENT)
Dept: CARDIOLOGY | Facility: HOSPITAL | Age: 75
End: 2025-08-26
Payer: MEDICARE

## 2025-08-26 VITALS
OXYGEN SATURATION: 99 % | DIASTOLIC BLOOD PRESSURE: 44 MMHG | HEART RATE: 49 BPM | SYSTOLIC BLOOD PRESSURE: 124 MMHG | RESPIRATION RATE: 16 BRPM

## 2025-08-26 VITALS
DIASTOLIC BLOOD PRESSURE: 80 MMHG | SYSTOLIC BLOOD PRESSURE: 120 MMHG | HEART RATE: 69 BPM | OXYGEN SATURATION: 98 % | WEIGHT: 216 LBS | BODY MASS INDEX: 28.5 KG/M2

## 2025-08-26 VITALS
BODY MASS INDEX: 28.63 KG/M2 | DIASTOLIC BLOOD PRESSURE: 50 MMHG | HEART RATE: 56 BPM | HEIGHT: 73 IN | WEIGHT: 216 LBS | SYSTOLIC BLOOD PRESSURE: 128 MMHG

## 2025-08-26 DIAGNOSIS — I10 ESSENTIAL HYPERTENSION: ICD-10-CM

## 2025-08-26 DIAGNOSIS — R06.09 DOE (DYSPNEA ON EXERTION): ICD-10-CM

## 2025-08-26 DIAGNOSIS — I48.92 ATRIAL FLUTTER WITH CONTROLLED RESPONSE: ICD-10-CM

## 2025-08-26 DIAGNOSIS — E78.00 HYPERCHOLESTEROLEMIA: ICD-10-CM

## 2025-08-26 DIAGNOSIS — I27.20 PULMONARY HYPERTENSION: ICD-10-CM

## 2025-08-26 DIAGNOSIS — R06.09 DOE (DYSPNEA ON EXERTION): Primary | ICD-10-CM

## 2025-08-26 LAB
AORTIC DIMENSIONLESS INDEX: 0.51 (DI)
AV MEAN PRESS GRAD SYS DOP V1V2: 4.6 MMHG
AV VMAX SYS DOP: 149.2 CM/SEC
BASOPHILS # BLD AUTO: 0.03 10*3/MM3 (ref 0–0.2)
BASOPHILS NFR BLD AUTO: 0.5 % (ref 0–1.5)
BH CV ECHO MEAS - ACS: 2 CM
BH CV ECHO MEAS - AO MAX PG: 8.9 MMHG
BH CV ECHO MEAS - AO ROOT AREA (BSA CORRECTED): 1.8 CM2
BH CV ECHO MEAS - AO ROOT DIAM: 3.9 CM
BH CV ECHO MEAS - AO V2 VTI: 34 CM
BH CV ECHO MEAS - AVA(I,D): 1.78 CM2
BH CV ECHO MEAS - EDV(CUBED): 182.8 ML
BH CV ECHO MEAS - EDV(MOD-SP2): 192 ML
BH CV ECHO MEAS - EDV(MOD-SP4): 213 ML
BH CV ECHO MEAS - EF(MOD-SP2): 58.3 %
BH CV ECHO MEAS - EF(MOD-SP4): 56.8 %
BH CV ECHO MEAS - ESV(MOD-SP2): 80 ML
BH CV ECHO MEAS - ESV(MOD-SP4): 92 ML
BH CV ECHO MEAS - FS: 38.9 %
BH CV ECHO MEAS - IVS/LVPW: 0.99 CM
BH CV ECHO MEAS - IVSD: 1.18 CM
BH CV ECHO MEAS - LAT PEAK E' VEL: 16.9 CM/SEC
BH CV ECHO MEAS - LV DIASTOLIC VOL/BSA (35-75): 95.8 CM2
BH CV ECHO MEAS - LV MASS(C)D: 281.1 GRAMS
BH CV ECHO MEAS - LV MAX PG: 2.02 MMHG
BH CV ECHO MEAS - LV MEAN PG: 1.26 MMHG
BH CV ECHO MEAS - LV SYSTOLIC VOL/BSA (12-30): 41.4 CM2
BH CV ECHO MEAS - LV V1 MAX: 71.1 CM/SEC
BH CV ECHO MEAS - LV V1 VTI: 17.5 CM
BH CV ECHO MEAS - LVIDD: 5.7 CM
BH CV ECHO MEAS - LVIDS: 3.5 CM
BH CV ECHO MEAS - LVOT AREA: 3.4 CM2
BH CV ECHO MEAS - LVOT DIAM: 2.1 CM
BH CV ECHO MEAS - LVPWD: 1.19 CM
BH CV ECHO MEAS - MED PEAK E' VEL: 11.3 CM/SEC
BH CV ECHO MEAS - MR MAX PG: 11.7 MMHG
BH CV ECHO MEAS - MR MAX VEL: 170.7 CM/SEC
BH CV ECHO MEAS - MV DEC SLOPE: 586.5 CM/SEC2
BH CV ECHO MEAS - MV DEC TIME: 0.14 SEC
BH CV ECHO MEAS - MV E MAX VEL: 73.9 CM/SEC
BH CV ECHO MEAS - MV MAX PG: 3.8 MMHG
BH CV ECHO MEAS - MV MEAN PG: 1.14 MMHG
BH CV ECHO MEAS - MV P1/2T: 45.1 MSEC
BH CV ECHO MEAS - MV V2 VTI: 24.2 CM
BH CV ECHO MEAS - MVA(P1/2T): 4.9 CM2
BH CV ECHO MEAS - MVA(VTI): 2.5 CM2
BH CV ECHO MEAS - PA V2 MAX: 110.4 CM/SEC
BH CV ECHO MEAS - QP/QS: 0.85
BH CV ECHO MEAS - RAP SYSTOLE: 3 MMHG
BH CV ECHO MEAS - RV MAX PG: 1.01 MMHG
BH CV ECHO MEAS - RV V1 MAX: 50.3 CM/SEC
BH CV ECHO MEAS - RV V1 VTI: 10.1 CM
BH CV ECHO MEAS - RVOT DIAM: 2.5 CM
BH CV ECHO MEAS - SV(LVOT): 60.3 ML
BH CV ECHO MEAS - SV(MOD-SP2): 112 ML
BH CV ECHO MEAS - SV(MOD-SP4): 121 ML
BH CV ECHO MEAS - SV(RVOT): 51.3 ML
BH CV ECHO MEAS - SVI(LVOT): 27.1 ML/M2
BH CV ECHO MEAS - SVI(MOD-SP2): 50.4 ML/M2
BH CV ECHO MEAS - SVI(MOD-SP4): 54.4 ML/M2
BH CV ECHO MEAS - TAPSE (>1.6): 1.38 CM
BH CV ECHO MEASUREMENTS AVERAGE E/E' RATIO: 5.24
BH CV XLRA - RV BASE: 3.8 CM
BH CV XLRA - RV LENGTH: 7.5 CM
BH CV XLRA - RV MID: 3.1 CM
BH CV XLRA - TDI S': 10.6 CM/SEC
D DIMER PPP FEU-MCNC: 0.46 MCGFEU/ML (ref 0–0.73)
DEPRECATED RDW RBC AUTO: 50.7 FL (ref 37–54)
EOSINOPHIL # BLD AUTO: 0.63 10*3/MM3 (ref 0–0.4)
EOSINOPHIL NFR BLD AUTO: 10.6 % (ref 0.3–6.2)
ERYTHROCYTE [DISTWIDTH] IN BLOOD BY AUTOMATED COUNT: 13.6 % (ref 12.3–15.4)
GEN 5 1HR TROPONIN T REFLEX: 56 NG/L
HCT VFR BLD AUTO: 35.8 % (ref 37.5–51)
HGB BLD-MCNC: 11.8 G/DL (ref 13–17.7)
IMM GRANULOCYTES # BLD AUTO: 0.02 10*3/MM3 (ref 0–0.05)
IMM GRANULOCYTES NFR BLD AUTO: 0.3 % (ref 0–0.5)
LEFT ATRIUM VOLUME INDEX: 45.9 ML/M2
LV EF BIPLANE MOD: 57.3 %
LYMPHOCYTES # BLD AUTO: 1.39 10*3/MM3 (ref 0.7–3.1)
LYMPHOCYTES NFR BLD AUTO: 23.4 % (ref 19.6–45.3)
MCH RBC QN AUTO: 33.7 PG (ref 26.6–33)
MCHC RBC AUTO-ENTMCNC: 33 G/DL (ref 31.5–35.7)
MCV RBC AUTO: 102.3 FL (ref 79–97)
MONOCYTES # BLD AUTO: 0.67 10*3/MM3 (ref 0.1–0.9)
MONOCYTES NFR BLD AUTO: 11.3 % (ref 5–12)
NEUTROPHILS NFR BLD AUTO: 3.19 10*3/MM3 (ref 1.7–7)
NEUTROPHILS NFR BLD AUTO: 53.9 % (ref 42.7–76)
NRBC BLD AUTO-RTO: 0 /100 WBC (ref 0–0.2)
NT-PROBNP SERPL-MCNC: 2807 PG/ML (ref 0–900)
PLATELET # BLD AUTO: 232 10*3/MM3 (ref 140–450)
PMV BLD AUTO: 10.2 FL (ref 6–12)
PROCALCITONIN SERPL-MCNC: 0.42 NG/ML (ref 0–0.25)
RBC # BLD AUTO: 3.5 10*6/MM3 (ref 4.14–5.8)
SINUS: 3.8 CM
STJ: 3 CM
TROPONIN T % DELTA: -3
TROPONIN T NUMERIC DELTA: -2 NG/L
TROPONIN T SERPL HS-MCNC: 58 NG/L
WBC NRBC COR # BLD AUTO: 5.93 10*3/MM3 (ref 3.4–10.8)

## 2025-08-26 PROCEDURE — 93000 ELECTROCARDIOGRAM COMPLETE: CPT | Performed by: FAMILY MEDICINE

## 2025-08-26 PROCEDURE — 25510000001 PERFLUTREN 6.52 MG/ML SUSPENSION 2 ML VIAL: Performed by: FAMILY MEDICINE

## 2025-08-26 PROCEDURE — 93306 TTE W/DOPPLER COMPLETE: CPT

## 2025-08-26 PROCEDURE — 85025 COMPLETE CBC W/AUTO DIFF WBC: CPT | Performed by: FAMILY MEDICINE

## 2025-08-26 PROCEDURE — 83880 ASSAY OF NATRIURETIC PEPTIDE: CPT | Performed by: FAMILY MEDICINE

## 2025-08-26 PROCEDURE — 3079F DIAST BP 80-89 MM HG: CPT | Performed by: FAMILY MEDICINE

## 2025-08-26 PROCEDURE — 94760 N-INVAS EAR/PLS OXIMETRY 1: CPT

## 2025-08-26 PROCEDURE — 36415 COLL VENOUS BLD VENIPUNCTURE: CPT

## 2025-08-26 PROCEDURE — 84145 PROCALCITONIN (PCT): CPT | Performed by: FAMILY MEDICINE

## 2025-08-26 PROCEDURE — 99214 OFFICE O/P EST MOD 30 MIN: CPT | Performed by: FAMILY MEDICINE

## 2025-08-26 PROCEDURE — 85379 FIBRIN DEGRADATION QUANT: CPT | Performed by: FAMILY MEDICINE

## 2025-08-26 PROCEDURE — 3074F SYST BP LT 130 MM HG: CPT | Performed by: FAMILY MEDICINE

## 2025-08-26 PROCEDURE — 84484 ASSAY OF TROPONIN QUANT: CPT | Performed by: FAMILY MEDICINE

## 2025-08-26 RX ADMIN — PERFLUTREN 2 ML: 6.52 INJECTION, SUSPENSION INTRAVENOUS at 13:34

## (undated) DEVICE — GOWN,NON-REINFORCED,SIRUS,SET IN SLV,XL: Brand: MEDLINE

## (undated) DEVICE — PK AV FISTL/SHNT 40

## (undated) DEVICE — TOTAL TRAY, 16FR 10ML SIL FOLEY, URN: Brand: MEDLINE

## (undated) DEVICE — ADHS SKIN DERMABOND TOP ADVANCED

## (undated) DEVICE — Device

## (undated) DEVICE — SUT SILK 3/0 TIES 18IN A184H

## (undated) DEVICE — ANTIBACTERIAL UNDYED BRAIDED (POLYGLACTIN 910), SYNTHETIC ABSORBABLE SUTURE: Brand: COATED VICRYL

## (undated) DEVICE — GLV SURG BIOGEL LTX PF 7

## (undated) DEVICE — SUT SILK 3/0 SH CR5 18IN C0135

## (undated) DEVICE — BANDAGE,GAUZE,BULKEE II,4.5"X4.1YD,STRL: Brand: MEDLINE

## (undated) DEVICE — SOL NACL 0.9PCT 1000ML

## (undated) DEVICE — ENDOCUT SCISSOR TIP, DISPOSABLE: Brand: RENEW

## (undated) DEVICE — SOL NS 500ML

## (undated) DEVICE — UNDYED BRAIDED (POLYGLACTIN 910), SYNTHETIC ABSORBABLE SUTURE: Brand: COATED VICRYL

## (undated) DEVICE — JACKSON-PRATT 100CC BULB RESERVOIR: Brand: CARDINAL HEALTH

## (undated) DEVICE — ENDOPOUCH RETRIEVER SPECIMEN RETRIEVAL BAGS: Brand: ENDOPOUCH RETRIEVER

## (undated) DEVICE — INTENDED FOR TISSUE SEPARATION, AND OTHER PROCEDURES THAT REQUIRE A SHARP SURGICAL BLADE TO PUNCTURE OR CUT.: Brand: BARD-PARKER ® CARBON RIB-BACK BLADES

## (undated) DEVICE — APPL CHLORAPREP W/TINT 26ML ORNG

## (undated) DEVICE — SYS BALN KII DISSCT RND C0K12

## (undated) DEVICE — DEV COND GAS LAP INSUFLOW W/LUER CONN

## (undated) DEVICE — SUT PROLN 6/0 BV1 D/A 30IN 8709H

## (undated) DEVICE — HARMONIC ACE +7 LAPAROSCOPIC SHEARS ADVANCED HEMOSTASIS 5MM DIAMETER 36CM SHAFT LENGTH  FOR USE WITH GRAY HAND PIECE ONLY: Brand: HARMONIC ACE

## (undated) DEVICE — ENDOPATH XCEL BLADELESS TROCARS WITH STABILITY SLEEVES: Brand: ENDOPATH XCEL

## (undated) DEVICE — LOU LAP CHOLE: Brand: MEDLINE INDUSTRIES, INC.

## (undated) DEVICE — APPL HEMOS FOR DELIVERY FLOSEAL

## (undated) DEVICE — TRAP FLD MINIVAC MEGADYNE 100ML

## (undated) DEVICE — PENCL E/S ULTRAVAC TELESCP NOSE HOLSTR 10FT

## (undated) DEVICE — VISUALIZATION SYSTEM: Brand: CLEARIFY

## (undated) DEVICE — SUT SILK 2/0 TIES 18IN A185H